# Patient Record
Sex: MALE | Race: WHITE | NOT HISPANIC OR LATINO | Employment: OTHER | URBAN - METROPOLITAN AREA
[De-identification: names, ages, dates, MRNs, and addresses within clinical notes are randomized per-mention and may not be internally consistent; named-entity substitution may affect disease eponyms.]

---

## 2017-11-28 ENCOUNTER — HOSPITAL ENCOUNTER (INPATIENT)
Facility: CLINIC | Age: 82
LOS: 2 days | Discharge: HOME | DRG: 247 | End: 2017-11-30
Attending: EMERGENCY MEDICINE | Admitting: INTERNAL MEDICINE
Payer: MEDICARE

## 2017-11-28 ENCOUNTER — APPOINTMENT (OUTPATIENT)
Dept: GENERAL RADIOLOGY | Facility: CLINIC | Age: 82
DRG: 247 | End: 2017-11-28
Attending: PHYSICIAN ASSISTANT
Payer: MEDICARE

## 2017-11-28 DIAGNOSIS — E11.65 TYPE 2 DIABETES MELLITUS WITH HYPERGLYCEMIA, WITHOUT LONG-TERM CURRENT USE OF INSULIN (H): ICD-10-CM

## 2017-11-28 DIAGNOSIS — I21.4 NSTEMI (NON-ST ELEVATED MYOCARDIAL INFARCTION) (H): ICD-10-CM

## 2017-11-28 DIAGNOSIS — I24.9 ACS (ACUTE CORONARY SYNDROME) (H): Primary | ICD-10-CM

## 2017-11-28 DIAGNOSIS — I10 ESSENTIAL HYPERTENSION: ICD-10-CM

## 2017-11-28 LAB
ALBUMIN SERPL-MCNC: 3.8 G/DL (ref 3.4–5)
ALP SERPL-CCNC: 58 U/L (ref 40–150)
ALT SERPL W P-5'-P-CCNC: 31 U/L (ref 0–70)
ANION GAP SERPL CALCULATED.3IONS-SCNC: 10 MMOL/L (ref 3–14)
AST SERPL W P-5'-P-CCNC: 21 U/L (ref 0–45)
BASOPHILS # BLD AUTO: 0 10E9/L (ref 0–0.2)
BASOPHILS NFR BLD AUTO: 0.2 %
BILIRUB SERPL-MCNC: 0.5 MG/DL (ref 0.2–1.3)
BUN SERPL-MCNC: 17 MG/DL (ref 7–30)
CALCIUM SERPL-MCNC: 9.3 MG/DL (ref 8.5–10.1)
CHLORIDE SERPL-SCNC: 101 MMOL/L (ref 94–109)
CO2 SERPL-SCNC: 27 MMOL/L (ref 20–32)
CREAT SERPL-MCNC: 0.76 MG/DL (ref 0.66–1.25)
DIFFERENTIAL METHOD BLD: NORMAL
EOSINOPHIL # BLD AUTO: 0.2 10E9/L (ref 0–0.7)
EOSINOPHIL NFR BLD AUTO: 1.5 %
ERYTHROCYTE [DISTWIDTH] IN BLOOD BY AUTOMATED COUNT: 13.4 % (ref 10–15)
FLUAV+FLUBV AG SPEC QL: NEGATIVE
FLUAV+FLUBV AG SPEC QL: NEGATIVE
GFR SERPL CREATININE-BSD FRML MDRD: >90 ML/MIN/1.7M2
GLUCOSE BLDC GLUCOMTR-MCNC: 129 MG/DL (ref 70–99)
GLUCOSE SERPL-MCNC: 242 MG/DL (ref 70–99)
HBA1C MFR BLD: 6 % (ref 4.3–6)
HCT VFR BLD AUTO: 43.5 % (ref 40–53)
HGB BLD-MCNC: 15.1 G/DL (ref 13.3–17.7)
IMM GRANULOCYTES # BLD: 0 10E9/L (ref 0–0.4)
IMM GRANULOCYTES NFR BLD: 0.3 %
LMWH PPP CHRO-ACNC: 0.22 IU/ML
LYMPHOCYTES # BLD AUTO: 1.4 10E9/L (ref 0.8–5.3)
LYMPHOCYTES NFR BLD AUTO: 13.5 %
MCH RBC QN AUTO: 30.3 PG (ref 26.5–33)
MCHC RBC AUTO-ENTMCNC: 34.7 G/DL (ref 31.5–36.5)
MCV RBC AUTO: 87 FL (ref 78–100)
MONOCYTES # BLD AUTO: 0.6 10E9/L (ref 0–1.3)
MONOCYTES NFR BLD AUTO: 5.9 %
NEUTROPHILS # BLD AUTO: 7.8 10E9/L (ref 1.6–8.3)
NEUTROPHILS NFR BLD AUTO: 78.6 %
NRBC # BLD AUTO: 0 10*3/UL
NRBC BLD AUTO-RTO: 0 /100
PLATELET # BLD AUTO: 209 10E9/L (ref 150–450)
POTASSIUM SERPL-SCNC: 3.5 MMOL/L (ref 3.4–5.3)
PROT SERPL-MCNC: 7.1 G/DL (ref 6.8–8.8)
RBC # BLD AUTO: 4.99 10E12/L (ref 4.4–5.9)
SODIUM SERPL-SCNC: 138 MMOL/L (ref 133–144)
SPECIMEN SOURCE: NORMAL
TROPONIN I SERPL-MCNC: 0.33 UG/L (ref 0–0.04)
TROPONIN I SERPL-MCNC: 2.84 UG/L (ref 0–0.04)
TROPONIN I SERPL-MCNC: 7.1 UG/L (ref 0–0.04)
WBC # BLD AUTO: 10 10E9/L (ref 4–11)

## 2017-11-28 PROCEDURE — 99285 EMERGENCY DEPT VISIT HI MDM: CPT | Mod: 25

## 2017-11-28 PROCEDURE — 85025 COMPLETE CBC W/AUTO DIFF WBC: CPT | Performed by: PHYSICIAN ASSISTANT

## 2017-11-28 PROCEDURE — 93010 ELECTROCARDIOGRAM REPORT: CPT | Performed by: INTERNAL MEDICINE

## 2017-11-28 PROCEDURE — 99223 1ST HOSP IP/OBS HIGH 75: CPT | Mod: 25 | Performed by: INTERNAL MEDICINE

## 2017-11-28 PROCEDURE — 25000128 H RX IP 250 OP 636: Performed by: PHYSICIAN ASSISTANT

## 2017-11-28 PROCEDURE — 25000132 ZZH RX MED GY IP 250 OP 250 PS 637: Performed by: NURSE PRACTITIONER

## 2017-11-28 PROCEDURE — 84484 ASSAY OF TROPONIN QUANT: CPT | Performed by: NURSE PRACTITIONER

## 2017-11-28 PROCEDURE — 96376 TX/PRO/DX INJ SAME DRUG ADON: CPT

## 2017-11-28 PROCEDURE — 87804 INFLUENZA ASSAY W/OPTIC: CPT | Performed by: NURSE PRACTITIONER

## 2017-11-28 PROCEDURE — 80053 COMPREHEN METABOLIC PANEL: CPT | Performed by: PHYSICIAN ASSISTANT

## 2017-11-28 PROCEDURE — 00000146 ZZHCL STATISTIC GLUCOSE BY METER IP

## 2017-11-28 PROCEDURE — 99223 1ST HOSP IP/OBS HIGH 75: CPT | Mod: AI | Performed by: NURSE PRACTITIONER

## 2017-11-28 PROCEDURE — 85520 HEPARIN ASSAY: CPT | Performed by: INTERNAL MEDICINE

## 2017-11-28 PROCEDURE — 96365 THER/PROPH/DIAG IV INF INIT: CPT

## 2017-11-28 PROCEDURE — 21000001 ZZH R&B HEART CARE

## 2017-11-28 PROCEDURE — 93005 ELECTROCARDIOGRAM TRACING: CPT

## 2017-11-28 PROCEDURE — 25000131 ZZH RX MED GY IP 250 OP 636 PS 637: Performed by: NURSE PRACTITIONER

## 2017-11-28 PROCEDURE — 99207 ZZC APP CREDIT; MD BILLING SHARED VISIT: CPT | Performed by: INTERNAL MEDICINE

## 2017-11-28 PROCEDURE — 84484 ASSAY OF TROPONIN QUANT: CPT | Performed by: PHYSICIAN ASSISTANT

## 2017-11-28 PROCEDURE — 71020 XR CHEST 2 VW: CPT

## 2017-11-28 PROCEDURE — 83036 HEMOGLOBIN GLYCOSYLATED A1C: CPT | Performed by: NURSE PRACTITIONER

## 2017-11-28 PROCEDURE — 25000128 H RX IP 250 OP 636: Performed by: NURSE PRACTITIONER

## 2017-11-28 PROCEDURE — 25000132 ZZH RX MED GY IP 250 OP 250 PS 637: Performed by: PHYSICIAN ASSISTANT

## 2017-11-28 PROCEDURE — 83036 HEMOGLOBIN GLYCOSYLATED A1C: CPT | Performed by: PHYSICIAN ASSISTANT

## 2017-11-28 PROCEDURE — 36415 COLL VENOUS BLD VENIPUNCTURE: CPT | Performed by: INTERNAL MEDICINE

## 2017-11-28 PROCEDURE — 36415 COLL VENOUS BLD VENIPUNCTURE: CPT | Performed by: NURSE PRACTITIONER

## 2017-11-28 PROCEDURE — 25000132 ZZH RX MED GY IP 250 OP 250 PS 637: Performed by: INTERNAL MEDICINE

## 2017-11-28 PROCEDURE — 25000125 ZZHC RX 250: Performed by: INTERNAL MEDICINE

## 2017-11-28 RX ORDER — AMOXICILLIN 250 MG
1 CAPSULE ORAL 2 TIMES DAILY PRN
Status: DISCONTINUED | OUTPATIENT
Start: 2017-11-28 | End: 2017-11-30 | Stop reason: HOSPADM

## 2017-11-28 RX ORDER — PROCHLORPERAZINE MALEATE 5 MG
5 TABLET ORAL EVERY 6 HOURS PRN
Status: DISCONTINUED | OUTPATIENT
Start: 2017-11-28 | End: 2017-11-30 | Stop reason: HOSPADM

## 2017-11-28 RX ORDER — PREDNISONE 20 MG/1
20 TABLET ORAL 2 TIMES DAILY WITH MEALS
Status: COMPLETED | OUTPATIENT
Start: 2017-11-28 | End: 2017-11-30

## 2017-11-28 RX ORDER — DEXTROSE MONOHYDRATE 25 G/50ML
25-50 INJECTION, SOLUTION INTRAVENOUS
Status: DISCONTINUED | OUTPATIENT
Start: 2017-11-28 | End: 2017-11-30 | Stop reason: HOSPADM

## 2017-11-28 RX ORDER — ACETAMINOPHEN 325 MG/1
650 TABLET ORAL EVERY 4 HOURS PRN
Status: DISCONTINUED | OUTPATIENT
Start: 2017-11-28 | End: 2017-11-29

## 2017-11-28 RX ORDER — ONDANSETRON 2 MG/ML
4 INJECTION INTRAMUSCULAR; INTRAVENOUS EVERY 6 HOURS PRN
Status: DISCONTINUED | OUTPATIENT
Start: 2017-11-28 | End: 2017-11-30 | Stop reason: HOSPADM

## 2017-11-28 RX ORDER — CLONIDINE HYDROCHLORIDE 0.1 MG/1
0.1 TABLET ORAL 2 TIMES DAILY
Status: DISCONTINUED | OUTPATIENT
Start: 2017-11-28 | End: 2017-11-30 | Stop reason: HOSPADM

## 2017-11-28 RX ORDER — KETOCONAZOLE 20 MG/G
CREAM TOPICAL DAILY PRN
COMMUNITY
End: 2017-12-13

## 2017-11-28 RX ORDER — METHYLPREDNISOLONE 16 MG/1
32 TABLET ORAL ONCE
Status: COMPLETED | OUTPATIENT
Start: 2017-11-28 | End: 2017-11-28

## 2017-11-28 RX ORDER — DIPHENHYDRAMINE HCL 25 MG
50 CAPSULE ORAL ONCE
Status: DISCONTINUED | OUTPATIENT
Start: 2017-11-28 | End: 2017-11-30 | Stop reason: HOSPADM

## 2017-11-28 RX ORDER — HYDRALAZINE HYDROCHLORIDE 20 MG/ML
10 INJECTION INTRAMUSCULAR; INTRAVENOUS EVERY 4 HOURS PRN
Status: DISCONTINUED | OUTPATIENT
Start: 2017-11-28 | End: 2017-11-30 | Stop reason: HOSPADM

## 2017-11-28 RX ORDER — HYDROCODONE BITARTRATE AND ACETAMINOPHEN 5; 325 MG/1; MG/1
1-2 TABLET ORAL EVERY 4 HOURS PRN
Status: DISCONTINUED | OUTPATIENT
Start: 2017-11-28 | End: 2017-11-29

## 2017-11-28 RX ORDER — NITROGLYCERIN 80 MG/1
1 PATCH TRANSDERMAL ONCE
Status: COMPLETED | OUTPATIENT
Start: 2017-11-28 | End: 2017-11-28

## 2017-11-28 RX ORDER — DIPHENHYDRAMINE HYDROCHLORIDE 50 MG/ML
25 INJECTION INTRAMUSCULAR; INTRAVENOUS
Status: COMPLETED | OUTPATIENT
Start: 2017-11-29 | End: 2017-11-29

## 2017-11-28 RX ORDER — POLYETHYLENE GLYCOL 3350 17 G/17G
17 POWDER, FOR SOLUTION ORAL DAILY PRN
Status: DISCONTINUED | OUTPATIENT
Start: 2017-11-28 | End: 2017-11-30 | Stop reason: HOSPADM

## 2017-11-28 RX ORDER — SODIUM CHLORIDE 9 MG/ML
INJECTION, SOLUTION INTRAVENOUS CONTINUOUS
Status: DISCONTINUED | OUTPATIENT
Start: 2017-11-28 | End: 2017-11-29

## 2017-11-28 RX ORDER — ASPIRIN 325 MG
325 TABLET ORAL DAILY
Status: DISCONTINUED | OUTPATIENT
Start: 2017-11-29 | End: 2017-11-29

## 2017-11-28 RX ORDER — LOSARTAN POTASSIUM 50 MG/1
50 TABLET ORAL DAILY
Status: DISCONTINUED | OUTPATIENT
Start: 2017-11-28 | End: 2017-11-30 | Stop reason: HOSPADM

## 2017-11-28 RX ORDER — ONDANSETRON 4 MG/1
4 TABLET, ORALLY DISINTEGRATING ORAL EVERY 6 HOURS PRN
Status: DISCONTINUED | OUTPATIENT
Start: 2017-11-28 | End: 2017-11-30 | Stop reason: HOSPADM

## 2017-11-28 RX ORDER — AMOXICILLIN 250 MG
2 CAPSULE ORAL 2 TIMES DAILY PRN
Status: DISCONTINUED | OUTPATIENT
Start: 2017-11-28 | End: 2017-11-30 | Stop reason: HOSPADM

## 2017-11-28 RX ORDER — MULTIVIT WITH MINERALS/LUTEIN
1 TABLET ORAL DAILY
COMMUNITY

## 2017-11-28 RX ORDER — POTASSIUM CHLORIDE 1.5 G/1.58G
20-40 POWDER, FOR SOLUTION ORAL
Status: DISCONTINUED | OUTPATIENT
Start: 2017-11-28 | End: 2017-11-30 | Stop reason: HOSPADM

## 2017-11-28 RX ORDER — LIDOCAINE 40 MG/G
CREAM TOPICAL
Status: DISCONTINUED | OUTPATIENT
Start: 2017-11-28 | End: 2017-11-29

## 2017-11-28 RX ORDER — ATORVASTATIN CALCIUM 40 MG/1
40 TABLET, FILM COATED ORAL DAILY
Status: DISCONTINUED | OUTPATIENT
Start: 2017-11-28 | End: 2017-11-29

## 2017-11-28 RX ORDER — NEBIVOLOL 5 MG/1
5 TABLET ORAL DAILY
Status: DISCONTINUED | OUTPATIENT
Start: 2017-11-28 | End: 2017-11-30 | Stop reason: HOSPADM

## 2017-11-28 RX ORDER — TERBINAFINE HYDROCHLORIDE 250 MG/1
250 TABLET ORAL DAILY
COMMUNITY
End: 2017-12-13

## 2017-11-28 RX ORDER — GUAIFENESIN/DEXTROMETHORPHAN 100-10MG/5
10 SYRUP ORAL EVERY 4 HOURS PRN
Status: DISCONTINUED | OUTPATIENT
Start: 2017-11-28 | End: 2017-11-30 | Stop reason: HOSPADM

## 2017-11-28 RX ORDER — NICOTINE POLACRILEX 4 MG
15-30 LOZENGE BUCCAL
Status: DISCONTINUED | OUTPATIENT
Start: 2017-11-28 | End: 2017-11-30 | Stop reason: HOSPADM

## 2017-11-28 RX ORDER — POTASSIUM CHLORIDE 29.8 MG/ML
20 INJECTION INTRAVENOUS
Status: DISCONTINUED | OUTPATIENT
Start: 2017-11-28 | End: 2017-11-30 | Stop reason: HOSPADM

## 2017-11-28 RX ORDER — NALOXONE HYDROCHLORIDE 0.4 MG/ML
.1-.4 INJECTION, SOLUTION INTRAMUSCULAR; INTRAVENOUS; SUBCUTANEOUS
Status: DISCONTINUED | OUTPATIENT
Start: 2017-11-28 | End: 2017-11-28

## 2017-11-28 RX ORDER — ALUMINA, MAGNESIA, AND SIMETHICONE 2400; 2400; 240 MG/30ML; MG/30ML; MG/30ML
30 SUSPENSION ORAL EVERY 4 HOURS PRN
Status: DISCONTINUED | OUTPATIENT
Start: 2017-11-28 | End: 2017-11-30 | Stop reason: HOSPADM

## 2017-11-28 RX ORDER — PROCHLORPERAZINE 25 MG
12.5 SUPPOSITORY, RECTAL RECTAL EVERY 12 HOURS PRN
Status: DISCONTINUED | OUTPATIENT
Start: 2017-11-28 | End: 2017-11-30 | Stop reason: HOSPADM

## 2017-11-28 RX ORDER — POTASSIUM CHLORIDE 1500 MG/1
20-40 TABLET, EXTENDED RELEASE ORAL
Status: DISCONTINUED | OUTPATIENT
Start: 2017-11-28 | End: 2017-11-30 | Stop reason: HOSPADM

## 2017-11-28 RX ORDER — ASPIRIN 81 MG/1
324 TABLET, CHEWABLE ORAL ONCE
Status: COMPLETED | OUTPATIENT
Start: 2017-11-28 | End: 2017-11-28

## 2017-11-28 RX ORDER — POTASSIUM CHLORIDE 7.45 MG/ML
10 INJECTION INTRAVENOUS
Status: DISCONTINUED | OUTPATIENT
Start: 2017-11-28 | End: 2017-11-30 | Stop reason: HOSPADM

## 2017-11-28 RX ORDER — DIPHENHYDRAMINE HYDROCHLORIDE 50 MG/ML
25 INJECTION INTRAMUSCULAR; INTRAVENOUS EVERY 6 HOURS PRN
Status: DISCONTINUED | OUTPATIENT
Start: 2017-11-28 | End: 2017-11-30 | Stop reason: HOSPADM

## 2017-11-28 RX ORDER — BISACODYL 10 MG
10 SUPPOSITORY, RECTAL RECTAL DAILY PRN
Status: DISCONTINUED | OUTPATIENT
Start: 2017-11-28 | End: 2017-11-30 | Stop reason: HOSPADM

## 2017-11-28 RX ORDER — AMLODIPINE BESYLATE 10 MG/1
10 TABLET ORAL DAILY
Status: DISCONTINUED | OUTPATIENT
Start: 2017-11-28 | End: 2017-11-30 | Stop reason: HOSPADM

## 2017-11-28 RX ORDER — POTASSIUM CL/LIDO/0.9 % NACL 10MEQ/0.1L
10 INTRAVENOUS SOLUTION, PIGGYBACK (ML) INTRAVENOUS
Status: DISCONTINUED | OUTPATIENT
Start: 2017-11-28 | End: 2017-11-30 | Stop reason: HOSPADM

## 2017-11-28 RX ORDER — METHYLPREDNISOLONE 16 MG/1
32 TABLET ORAL ONCE
Status: COMPLETED | OUTPATIENT
Start: 2017-11-28 | End: 2017-11-29

## 2017-11-28 RX ORDER — MAGNESIUM SULFATE HEPTAHYDRATE 40 MG/ML
4 INJECTION, SOLUTION INTRAVENOUS EVERY 4 HOURS PRN
Status: DISCONTINUED | OUTPATIENT
Start: 2017-11-28 | End: 2017-11-30 | Stop reason: HOSPADM

## 2017-11-28 RX ORDER — NALOXONE HYDROCHLORIDE 0.4 MG/ML
.1-.4 INJECTION, SOLUTION INTRAMUSCULAR; INTRAVENOUS; SUBCUTANEOUS
Status: DISCONTINUED | OUTPATIENT
Start: 2017-11-28 | End: 2017-11-29

## 2017-11-28 RX ADMIN — HEPARIN SODIUM 950 UNITS/HR: 10000 INJECTION, SOLUTION INTRAVENOUS at 12:52

## 2017-11-28 RX ADMIN — AMLODIPINE BESYLATE 10 MG: 10 TABLET ORAL at 16:05

## 2017-11-28 RX ADMIN — METHYLPREDNISOLONE 32 MG: 16 TABLET ORAL at 20:46

## 2017-11-28 RX ADMIN — LOSARTAN POTASSIUM 50 MG: 50 TABLET ORAL at 16:05

## 2017-11-28 RX ADMIN — CLONIDINE HYDROCHLORIDE 0.1 MG: 0.1 TABLET ORAL at 20:46

## 2017-11-28 RX ADMIN — NEBIVOLOL HYDROCHLORIDE 5 MG: 5 TABLET ORAL at 18:25

## 2017-11-28 RX ADMIN — PREDNISONE 20 MG: 20 TABLET ORAL at 18:25

## 2017-11-28 RX ADMIN — ASPIRIN 81 MG 324 MG: 81 TABLET ORAL at 12:27

## 2017-11-28 RX ADMIN — Medication 4650 UNITS: at 12:52

## 2017-11-28 RX ADMIN — ATORVASTATIN CALCIUM 40 MG: 40 TABLET, FILM COATED ORAL at 22:12

## 2017-11-28 RX ADMIN — CLONIDINE HYDROCHLORIDE 0.1 MG: 0.1 TABLET ORAL at 16:05

## 2017-11-28 RX ADMIN — SODIUM CHLORIDE: 9 INJECTION, SOLUTION INTRAVENOUS at 15:32

## 2017-11-28 RX ADMIN — NITROGLYCERIN 1 PATCH: 0.4 PATCH TRANSDERMAL at 12:28

## 2017-11-28 ASSESSMENT — ENCOUNTER SYMPTOMS
NUMBNESS: 0
HEADACHES: 0
ABDOMINAL PAIN: 0
WEAKNESS: 0
COUGH: 1
CHILLS: 1
VOMITING: 0
NAUSEA: 0
WHEEZING: 1
SORE THROAT: 1
DIAPHORESIS: 1
FEVER: 0

## 2017-11-28 NOTE — ED NOTES
DATE:  11/28/2017   TIME OF RECEIPT FROM LAB:  1202  LAB TEST:  trop  LAB VALUE:  0.332  RESULTS GIVEN WITH READ-BACK TO (PROVIDER):Dr. Vera  TIME LAB VALUE REPORTED TO PROVIDER:   8467

## 2017-11-28 NOTE — IP AVS SNAPSHOT
MRN:[id number]                      After Visit Summary   11/28/2017    Cayden Dewitt    MRN: [id number]           Thank you!     Thank you for choosing [facility] for your care. Our goal is always to provide you with excellent care. Hearing back from our patients is one way we can continue to improve our services. Please take a few minutes to complete the written survey that you may receive in the mail after you visit with us. Thank you!        Patient Information     Date Of Birth          1935        Designated Caregiver       Most Recent Value    Caregiver    Will someone help with your care after discharge? yes    Name of designated caregiver Abeba     Phone number of caregiver see chart     Caregiver address see chart       About your hospital stay     You were admitted on:  November 28, 2017 You last received care in the:  [facility]    You were discharged on:  November 30, 2017        Reason for your hospital stay       You were admitted with nstemi                  Who to Call     For medical emergencies, please call 911.  For non-urgent questions about your medical care, please call your primary care provider or clinic, None          Attending Provider     Provider Specialty    Barbara Vera MD Emergency Medicine    Keshia Rodas MD Internal Medicine    Karan, Moise Weller MD Internal Medicine       Primary Care Provider    None Specified      After Care Instructions     Activity       Your activity upon discharge: activity as tolerated            Diet       Follow this diet upon discharge: Orders Placed This Encounter      Combination Diet 3175-1645 Calories: High Consistent CHO (4-7 CHO units/meal); Low Saturated Fat Na <2400mg Diet                  Follow-up Appointments     Follow-up and recommended labs and tests        Follow up with primary care provider within 7 days for hospital follow- up.  No follow up labs or test are  needed.    Tuesday, DEC. 5TH AT 9:20AM WITH GREGORIO BRUMFIELD @ [facility]        SEE cardiology APPOINTMENTS BELOW                  Your next 10 appointments already scheduled     Dec 07, 2017  8:00 AM CST   Cardiac Evaluation with  Cardiac Rehab 1   [facility] ([facility])    [address], [address]  [facility] [address]   [phone]            Dec 13, 2017  1:30 PM CST   LAB with PANDEY LAB   [facility] ([facility])    [address] [place] [address] [address]  [facility] [address]   [phone]           Please do not eat 10-12 hours before your appointment if you are coming in fasting for labs on lipids, cholesterol, or glucose (sugar). This does not apply to pregnant women. Water, hot tea and black coffee (with nothing added) are okay. Do not drink other fluids, diet soda or chew gum.            Dec 13, 2017  2:30 PM CST   Return Discharge with THOMAS Fiore CNP   [facility] ([facility])    [address] [place], [place] [address]  [facility] [address]   [phone]            Feb 22, 2018  7:50 AM CST   LAB with PANDEY LAB   [facility] ([facility])    [address] [place], [place] [address]  [facility] [address]   [phone]           Please do not eat 10-12 hours before your appointment if you are coming in fasting for labs on lipids, cholesterol, or glucose (sugar). This does not apply to pregnant women. Water, hot tea and black coffee (with nothing added) are okay. Do not drink other fluids, diet soda or chew gum.            Feb 22, 2018  8:45 AM CST   Return Visit with Magdy Collins MD   [facility] ([facility])    [address]  [facility] [address]   [phone]              Additional Services     CARDIAC REHAB REFERRAL       Your  provider has referred you to: [facility]: [facility] [phone]   [email or web address]            Cardiac Rehab Referral       Your provider has referred you to: [facility]: [facility] [phone]   [email or web address]            Follow-Up with Cardiac Advanced Practice Provider           Follow-Up with Cardiologist                 Future tests that were ordered for you     Basic metabolic panel           ALT       Last Lab Result: ALT (U/L)       Date                     Value                 11/28/2017               31               ----------            Lipid Profile                 Further instructions from your care team       Cardiac Angiogram Discharge Instructions - Radial    After you go home:      Have an adult stay with you until tomorrow.    Drink extra fluids for 2 days.    You may resume your normal diet.    No smoking       For 24 hours - due to the sedation you received:    Relax and take it easy.    Do NOT make any important or legal decisions.    Do NOT drive or operate machines at home or at work.    Do NOT drink alcohol.    Care of Wrist Puncture Site:      For the first 24 hrs - check the puncture site every 1-2 hours while awake.    It is normal to have soreness at the puncture site and mild tingling in your hand for up to 3 days.    Remove the bandaid after 24 hours. If there is minor oozing, apply another bandaid and remove it after 12 hours.    You may shower tomorrow.  Do NOT take a bath, or use a hot tub or pool for at least 3 days. Do NOT scrub the site. Do not use lotion or powder near the puncture site.           Activity:        For 2 days:     do not use your hand or arm to support your weight (such as rising from a chair)     do not bend your wrist (such as lifting a garage door).    do not lift more than 5  pounds or exercise your arm (such as tennis, golf or bowling).    Do NOT do any heavy activity such as exercise, lifting, or straining.     Bleeding:      If you start bleeding from the site in your wrist, sit down and press firmly on/above the site for 10 minutes.     Once bleeding stops, keep arm still for 2 hours.     Call [facility] as soon as you can.       Call 911 right away if you have heavy bleeding or bleeding that does not stop.      Medicines:      If you are taking antiplatelet medications such as Plavix, Brilinta or Effient, do not stop taking it until you talk to your cardiologist.        If you are on Metformin (Glucophage), do not restart it until you have blood tests (within 2 to 3 days after discharge).  After you have your blood drawn, you may restart the Metformin.     Take your medications, including blood thinners, unless your provider tells you not to.  If you take Coumadin (Warfarin), have your INR checked by your provider in  3-5 days. Call your clinic to schedule this.    If you have stopped any medicines, check with your provider about when to restart them.    Follow Up Appointments:      Follow up with [facility] Nurse Practitioner at [facility] of patient preference in 7-10 days.    Call the clinic if:      You have a large or growing hard lump around the site.    The site is red, swollen, hot or tender.    Blood or fluid is draining from the site.    You have chills or a fever greater than 101 F (38 C).    Your arm turns feels numb, cool or changes color.    You have hives, a rash or unusual itching.    Any questions or concerns.          [facility] [place]:    [phone] [facility] (7 days a week)            Pending Results     Date and Time Order Name Status Description    11/29/2017 1600 EKG 12-lead, tracing only  Preliminary     11/29/2017 0105 EKG 12-lead, tracing only Preliminary     11/28/2017 1502 EKG 12-lead, tracing only Preliminary            "  Statement of Approval     Ordered          17 1325  I have reviewed and agree with all the recommendations and orders detailed in this document.  EFFECTIVE NOW     Approved and electronically signed by:  Moise Russo MD             Admission Information     Date & Time Provider Department Dept. Phone    2017 Moise Russo MD [facility] [phone]      Your Vitals Were     Blood Pressure Temperature Respirations Height Weight Pulse Oximetry    123/79 (BP Location: Left arm) 97.7  F (36.5  C) (Oral) 16 1.753 m (5' 9\") 95.9 kg (211 lb 6.7 oz) 98%    BMI (Body Mass Index)                   31.22 kg/m2           MyChart Information     [email or web address] lets you send messages to your doctor, view your test results, renew your prescriptions, schedule appointments and more. To sign up, go to [email or web address] . Click on \"Log in\" on the left side of the screen, which will take you to the Welcome page. Then click on \"Sign up Now\" on the right side of the page.     You will be asked to enter the access code listed below, as well as some personal information. Please follow the directions to create your username and password.     Your access code is: [email or web address]  Expires: 2018  9:33 AM     Your access code will  in 90 days. If you need help or a new code, please call your [facility] clinic or [phone].        Care EveryWhere ID     This is your Care EveryWhere ID. This could be used by other organizations to access your [facility] medical records  [id number]        Equal Access to Services     [facility] Center[facility] : Hadii delmi duran hadsherie Soalexandra, waaxda luqadaha, qaybta kaalmaesther martinez. So [facility] [phone].    ATENCIÓN: Si habla español, tiene a jasmine disposición servicios gratuitos de asistencia lingüística. Llame al [phone].    We comply with applicable federal civil rights laws and [place] laws. We do not " discriminate on the basis of race, color, national origin, age, disability, sex, sexual orientation, or gender identity.               Review of your medicines      START taking        Dose / Directions    aspirin 81 MG EC tablet        Dose:  81 mg   Start taking on:  12/1/2017   Take 1 tablet (81 mg) by mouth daily   Refills:  0       atorvastatin 20 MG tablet   Commonly known as:  LIPITOR        Dose:  20 mg   Take 1 tablet (20 mg) by mouth daily   Quantity:  30 tablet   Refills:  3       clopidogrel 75 MG tablet   Commonly known as:  PLAVIX        Dose:  75 mg   Start taking on:  12/1/2017   Take 1 tablet (75 mg) by mouth daily   Quantity:  30 tablet   Refills:  11       nebivolol 5 MG tablet   Commonly known as:  BYSTOLIC        Dose:  5 mg   Start taking on:  12/1/2017   Take 1 tablet (5 mg) by mouth daily   Quantity:  30 tablet   Refills:  3       nitroGLYcerin 0.4 MG sublingual tablet   Commonly known as:  NITROSTAT        For chest pain place 1 tablet under the tongue every 5 minutes for 3 doses. If symptoms persist 5 minutes after 1st dose call 911.   Quantity:  25 tablet   Refills:  3         CONTINUE these medicines which may have CHANGED, or have new prescriptions. If we are uncertain of the size of tablets/capsules you have at home, strength may be listed as something that might have changed.        Dose / Directions    amLODIPine 10 MG tablet   Commonly known as:  NORVASC   This may have changed:  medication strength   Used for:  Essential hypertension        Dose:  10 mg   Start taking on:  12/1/2017   Take 1 tablet (10 mg) by mouth daily   Quantity:  30 tablet   Refills:  1       metFORMIN 1000 MG tablet   Commonly known as:  GLUCOPHAGE   This may have changed:    - medication strength  - how much to take   Used for:  Type 2 diabetes mellitus with hyperglycemia, without long-term current use of insulin (H)   Notes to Patient:  May resume 48 hours after angiogram. Start 12/1 evening dose.         Dose:  1000 mg   Take 1 tablet (1,000 mg) by mouth 2 times daily (with meals)   Quantity:  60 tablet   Refills:  0         CONTINUE these medicines which have NOT CHANGED        Dose / Directions    CENTRUM SILVER per tablet        Dose:  1 tablet   Take 1 tablet by mouth daily   Refills:  0       CLONIDINE HCL PO        Dose:  0.1 mg   Take 0.1 mg by mouth 2 times daily   Refills:  0       ketoconazole 2 % cream   Commonly known as:  NIZORAL        Apply topically daily as needed (Apply to rash on feet)   Refills:  0       LOSARTAN POTASSIUM PO        Dose:  50 mg   Take 50 mg by mouth daily   Refills:  0       terbinafine 250 MG tablet   Commonly known as:  lamISIL        Dose:  250 mg   Take 250 mg by mouth daily   Refills:  0         STOP taking     HYDROCHLOROTHIAZIDE PO                Where to get your medicines      Some of these will need a paper prescription and others can be bought over the counter. Ask your nurse if you have questions.     Bring a paper prescription for each of these medications     amLODIPine 10 MG tablet    atorvastatin 20 MG tablet    clopidogrel 75 MG tablet    metFORMIN 1000 MG tablet    nebivolol 5 MG tablet    nitroGLYcerin 0.4 MG sublingual tablet       You don't need a prescription for these medications     aspirin 81 MG EC tablet               ANTIBIOTIC INSTRUCTION     You've Been Prescribed an Antibiotic - Now What?  Your healthcare team thinks that you or your loved one might have an infection. Some infections can be treated with antibiotics, which are powerful, life-saving drugs. Like all medications, antibiotics have side effects and should only be used when necessary. There are some important things you should know about your antibiotic treatment.      Your healthcare team may run tests before you start taking an antibiotic.    Your team may take samples (e.g., from your blood, urine or other areas) to run tests to look for bacteria. These test can be important to  determine if you need an antibiotic at all and, if you do, which antibiotic will work best.      Within a few days, your healthcare team might change or even stop your antibiotic.    Your team may start you on an antibiotic while they are working to find out what is making you sick.    Your team might change your antibiotic because test results show that a different antibiotic would be better to treat your infection.    In some cases, once your team has more information, they learn that you do not need an antibiotic at all. They may find out that you don't have an infection, or that the antibiotic you're taking won't work against your infection. For example, an infection caused by a virus can't be treated with antibiotics. Staying on an antibiotic when you don't need it is more likely to be harmful than helpful.      You may experience side effects from your antibiotic.    Like all medications, antibiotics have side effects. Some of these can be serious.    Let you healthcare team know if you have any known allergies when you are admitted to the hospital.    One significant side effect of nearly all antibiotics is the risk of severe and sometimes deadly diarrhea caused by Clostridium difficile (C. Difficile). This occurs when a person takes antibiotics because some good germs are destroyed. Antibiotic use allows C. diificile to take over, putting patients at high risk for this serious infection.    As a patient or caregiver, it is important to understand your or your loved one's antibiotic treatment. It is especially important for caregivers to speak up when patients can't speak for themselves. Here are some important questions to ask your healthcare team.    What infection is this antibiotic treating and how do you know I have that infection?    What side effects might occur from this antibiotic?    How long will I need to take this antibiotic?    Is it safe to take this antibiotic with other medications or  supplements (e.g., vitamins) that I am taking?     Are there any special directions I need to know about taking this antibiotic? For example, should I take it with food?    How will I be monitored to know whether my infection is responding to the antibiotic?    What tests may help to make sure the right antibiotic is prescribed for me?      Information provided by:  www.cdc.gov/getsmart  U.S. Department of Health and Human Services  Centers for disease Control and Prevention  National Center for Emerging and Zoonotic Infectious Diseases  Division of Healthcare Quality Promotion         Protect others around you: Learn how to safely use, store and throw away your medicines at www.disposemymeds.org.             Medication List: This is a list of all your medications and when to take them. Check marks below indicate your daily home schedule. Keep this list as a reference.      Medications           Morning Afternoon Evening Bedtime As Needed    amLODIPine 10 MG tablet   Commonly known as:  NORVASC   Take 1 tablet (10 mg) by mouth daily   Start taking on:  12/1/2017   Last time this was given:  10 mg on 11/30/2017  9:54 AM   Next Dose Due:  12/1 morning                                   aspirin 81 MG EC tablet   Take 1 tablet (81 mg) by mouth daily   Start taking on:  12/1/2017   Last time this was given:  81 mg on 11/30/2017  9:54 AM   Next Dose Due:  12/1 morning                                   atorvastatin 20 MG tablet   Commonly known as:  LIPITOR   Take 1 tablet (20 mg) by mouth daily   Last time this was given:  20 mg on 11/29/2017  9:44 PM   Next Dose Due:  12/1 morning                                   CENTRUM SILVER per tablet   Take 1 tablet by mouth daily   Next Dose Due:  12/1 morning                                   CLONIDINE HCL PO   Take 0.1 mg by mouth 2 times daily   Last time this was given:  0.1 mg on 11/30/2017  9:52 AM   Next Dose Due:  11/30 evening                                       clopidogrel 75 MG tablet   Commonly known as:  PLAVIX   Take 1 tablet (75 mg) by mouth daily   Start taking on:  12/1/2017   Last time this was given:  75 mg on 11/30/2017  9:53 AM   Next Dose Due:  12/1 morning                                   ketoconazole 2 % cream   Commonly known as:  NIZORAL   Apply topically daily as needed (Apply to rash on feet)                                   LOSARTAN POTASSIUM PO   Take 50 mg by mouth daily   Last time this was given:  50 mg on 11/30/2017  9:54 AM   Next Dose Due:  12/1 morning                                   metFORMIN 1000 MG tablet   Commonly known as:  GLUCOPHAGE   Take 1 tablet (1,000 mg) by mouth 2 times daily (with meals)   Next Dose Due:  12/1 evening   Notes to Patient:  May resume 48 hours after angiogram. Start 12/1 evening dose.                                      nebivolol 5 MG tablet   Commonly known as:  BYSTOLIC   Take 1 tablet (5 mg) by mouth daily   Start taking on:  12/1/2017   Last time this was given:  5 mg on 11/30/2017  9:53 AM   Next Dose Due:  12/1 morning                                   nitroGLYcerin 0.4 MG sublingual tablet   Commonly known as:  NITROSTAT   For chest pain place 1 tablet under the tongue every 5 minutes for 3 doses. If symptoms persist 5 minutes after 1st dose call 911.                                   terbinafine 250 MG tablet   Commonly known as:  lamISIL   Take 250 mg by mouth daily   Next Dose Due:  12/1 morning

## 2017-11-28 NOTE — CONSULTS
CARDIOLOGY CONSULTATION      REASON FOR CONSULTATION:  Chest pain      HISTORY OF PRESENT ILLNESS:  Cayden Soto is an 82-year-old gentleman who has no known heart disease but multiple cardiac risk factors.  For the last few days he has noticed what is probably bronchitis.  He reports having some wheezing, which he has had in the past, and he remembered having bronchitic asthma in the past.  He has been coughing quite a bit.  He developed a band of pain across his anterior chest, a little bit worse when he pushes on it and also worse when he is coughing.  It has been going on for a couple days.  He reports mostly clear sputum? but initially it was a little bit of yellow-colored sputum.  He denies any fever.  He has had a bit of a runny nose.  No one around him has been sick, and he has not had any recent travel.  He has tried warm packs, ice packs, etc. for the pain.  It is variably helped.  The pain has never gone to his neck, shoulders, jaw.  He has had no dizzy spells, nausea or syncope.  Finally, he came into the Emergency Room.  He had a mildly elevated troponin, and I have been asked to see him.      PAST MEDICAL HISTORY:   1.  Hypertension.   2.  Hyperlipidemia   3.  Previous history of asthma.   4.  Prostate surgery.   5.  Tonsillectomy.  6.   DM        Of note, he sees Dr. Kashif Nassar.  I do not see anything on our [facility] Everywhere, so they may use a different system.      MEDICATIONS ON ADMISSION:   1.  Clonidine 0.1 mg b.i.d.   2.  Amlodipine 10 mg daily.   3.  Losartan 50 mg daily.     4.  Nizoral p.r.n.    5.  Lamisil 250 mg daily.   6.  Metformin 850 mg b.i.d.   7.  Hydrochlorothiazide 25 mg daily.      ALLERGIES:  He has a dye allergy.  He had some type of angiogram film in the past and developed facial and lip swelling and was given some medicine, presumably epinephrine, and it cleared.  Also has an allergy to Cipro.      FAMILY HISTORY:  Father  of a subdural hematoma after a fall.   Mother  with history of Alzheimer's.  The patient is an only child.      SOCIAL HISTORY:  The patient is still working doing film restoration.  Never smoker and rarely drinks alcohol.      REVIEW OF SYSTEMS:  Rather completely negative except as outlined above.     CARDIAC:  This is the first time he has had any kind of chest pain.  No history of dizziness, palpitation.   RESPIRATORY:  Includes only a history of asthma in the past.   GASTROINTESTINAL:  Negative.   ORTHOPEDIC:  Negative.   ENDOCRINE:  Includes his diabetes.     NEUROLOGIC:  Negative.   GENITOURINARY:  Negative.   The remainder of the review of systems is negative.      PHYSICAL EXAMINATION:   GENERAL:  Reveals a pleasant gentleman in no apparent distress.  Any chest pain he has is very trivial, he states.    VITAL SIGNS:  Blood pressure is running high at 161/98, heart rate us 95, temperature 98.4, weight 88.5 kg.   SKIN:  No petechiae, rash or xanthoma.   HEENT:  Nonicteric.   NECK:  Supple, without thyromegaly or adenopathy.   CHEST:  Exam reveals soft wheezing on expiration bilaterally.   CARDIAC:  Exam reveals markedly distant heart tones.  No obvious murmur or rub.  Carotid pulses 2+.  Femoral pulse I cannot feel because of such a large pannus, but the dorsal pedal pulses are present at 1+.  No obvious bruits are noted in any area.  Neck veins are flat.   ABDOMEN:  Quite obese, which limits the exam.  No clear organomegaly.  The abdomen is nontender.  Normal bowel sounds are noted.   EXTREMITIES:  There is no cyanosis, clubbing.  There is trace edema bilaterally, which he states is chronic.  Palpation of the rib cage might slightly affect his chest pain.   NEUROLOGIC:  Alert and oriented.  Cranial nerves II-XII are intact.  Motor and sensory intact.      LABORATORY:  EKG demonstrates sinus rhythm with a right bundle branch block.  There is J point elevation in leads I and aVL with minimal Q-waves in those same leads, but that same exact  pattern was seen on an EKG dated 10/06/1996.  There are no acute ST-T changes, but there is nonspecific T-wave flattening inferior.  There is only 1 troponin back so far.  It is 0.332.  Glucose 242, sodium 138, potassium 3.5, creatinine 0.76.  CBC panel including white count is normal.  Influenza A and B were negative.  Chest x-ray is negative for infiltrates.      IMPRESSION:  This is a patient who has had what sounds like bronchitis as evidenced by cough productive of clear to yellow sputum and wheezing.  He has chest pain.  It is unclear if the chest pain is simply musculoskeletal pain from coughing versus angina.  His troponin has trivial elevation, but this has been going on for at least 2 days.  The EKG, although abnormal, is stable from .  Given a significant dye allergy and a questionable acute coronary syndrome now, I think we should continue on medical therapy, adding heparin and a statin and aspirin, and treating his blood pressure.  Will also place him on prednisone in preparation for heart catheterization.  Will plan on doing the heart catheterization tomorrow after he has received steroid and Benadryl.  The risks and benefits of angiogram, angioplasty or bypass surgery including the risk of stroke, heart attack and death were discussed with the patient, and he wishes to proceed.  Again, this may represent acute coronary syndrome, but this simply may be musculoskeletal pain from bronchitis and coughing, but again, multiple risk factors are noted.  We will also get a lipid profile.  His hemoglobin A1c suggests the diabetes has been pretty well controlled.         LU COLLINS MD             D: 2017 15:27   T: 2017 15:50   MT:       Name:     ABIGAIL FAGAN   MRN:      [id number]        Account:       [id number]   :      1935           Consult Date:  2017      Document: [id number]       cc: Kashif Collins MD

## 2017-11-28 NOTE — CONSULTS
Cardio dictated  Bronchitis  Poss acs  Add bystolic watch bp and cath tomorrow  Benadryl prednisone for allergy  thanks

## 2017-11-28 NOTE — ED PROVIDER NOTES
History     Chief Complaint:  Chest Pain       HPI   Cayden Dewitt is a 82 year old male with a history of hypertension, type II diabetes mellitus, and asthma who presents accompanied by his wife for evaluation of chest pain. Recently, the patient reports that he has had a frequent deep cough and a sore throat. Yesterday evening, the patient additionally developed some slight wheezing and had a brief episode of chills and diaphoresis. This morning, the patient awoke with soreness across his chest. The patient initially attributed his chest pain to muscular pain from his cough, and it did improve somewhat after taking a hot bath. However, due to this new chest pain the patient came into the ED for evaluation. While on the way to the ED, the patient had another episode of diaphoresis. Currently in the ED, the patient rates his chest pain at a severity of 7/10. Notably, the patient did not take his blood pressure medication this morning. He also expresses concern that he could have influenza as one of his friends who he has contact with recently had this. He has not had any measured fever, shortness of breath, new leg swelling, abdominal pain, nausea, vomiting, headache, numbness, tingling, or weakness in association with his current symptoms.     Allergies:  Ciprofloxacin  Iodine     Medications:    METFORMIN HCL PO  HYDROCHLOROTHIAZIDE PO    Past Medical History:    Hypertension  Diabetes mellitus, type II   Asthma     Past Surgical History:    Prostate surgery      Family History:    family history is not on file.     Social History:   reports that he has never smoked. He does not have any smokeless tobacco history on file.  The patient presents accompanied by his wife.   PCP: Kashif Nassar     Review of Systems   Constitutional: Positive for chills and diaphoresis. Negative for fever.   HENT: Positive for sore throat.    Respiratory: Positive for cough and wheezing.    Cardiovascular: Positive for chest  "pain. Negative for leg swelling.   Gastrointestinal: Negative for abdominal pain, nausea and vomiting.   Neurological: Negative for weakness, numbness and headaches.   All other systems reviewed and are negative.    Physical Exam     Patient Vitals for the past 24 hrs:   BP Temp Temp src Heart Rate Resp SpO2 Height Weight   11/28/17 1100 156/89 98.7  F (37.1  C) Oral 95 20 97 % 1.753 m (5' 9\") 88.5 kg (195 lb)        General: Alert, interactive in no distress. Obese  Head:  Scalp is atraumatic.  Eyes:  EOM intact. The pupils are equal, round, and reactive to light. No scleral icterus.  ENT:                                      Ears:  The external ears are normal.  Nose:  The external nose is normal.  Throat:  The oropharynx is normal. Mucus membranes are moist.                 Neck:  Normal range of motion. There is no rigidity. Trachea midline.  CV:  Regular rate and rhythm. No murmur. No reproducible chest pain.   Resp:  Breath sounds are clear bilaterally. Non-labored, no retractions or accessory muscle use.  GI:  Abdomen is soft, no distension, no tenderness.   MS:  Normal strength in all 4 extremities, No midline cervical, thoracic, or lumbar tenderness   Skin:  Warm and dry, No rash or lesions noted.  Neuro:  Strength 5/5 x4. Sensation grossly intact. CN 3-12 intact. GCS: 15  Psych:  Awake. Alert and orientedx3. Appropriate interactions.   Lymph: No anterior or posterior cervical lymphadenopathy noted.        Emergency Department Course     ECG (10:59:57):  Rate 101 bpm.   QT/QTc 396/513 ms.   P-R-T axes 9 7 -6.   Sinus tachycardia with premature atrial complexes, Right bundle branch block, T wave abnormality consider inferior ischemia, Abnormal ECG  Interpreted at 1120 by Barbara Vera MD.     Imaging:  XR Chest 2 Views   Preliminary Result   IMPRESSION: Negative.          All imaging results were discussed with the patient and wife who voiced understanding of the findings.    Laboratory:  Labs Ordered " and Resulted from Time of ED Arrival Up to the Time of Departure from the ED   COMPREHENSIVE METABOLIC PANEL - Abnormal; Notable for the following:        Result Value    Glucose 242 (*)     All other components within normal limits   TROPONIN I - Abnormal; Notable for the following:     Troponin I ES 0.332 (*)     All other components within normal limits   CBC WITH PLATELETS DIFFERENTIAL   PULSE OXIMETRY NURSING   CARDIAC CONTINUOUS MONITORING   MEASURE WEIGHT   PLATELETS MONITORED PER HEPARIN TREATMENT PROTOCOL (FOR MEANINGFUL USE      Interventions:  Medications   nitroGLYcerin (NITRODUR) patch REMOVAL (not administered)   heparin infusion 25,000 units in 0.45% NaCl 250 mL (950 Units/hr Intravenous New Bag 11/28/17 1252)   aspirin chewable tablet 324 mg (324 mg Oral Given 11/28/17 1227)   nitroGLYcerin (NITRODUR) 0.4 MG/HR 24 hr patch 1 patch (1 patch Transdermal Given 11/28/17 1228)   heparin Loading Dose bolus dose from infusion pump 4,650 Units (4,650 Units Intravenous Given 11/28/17 1252)        Emergency Department Course:  Past medical records, nursing notes, and vitals reviewed.  1114: I performed an exam of the patient and obtained history, as documented above.  Blood drawn and IV inserted   The patient was sent for a chest x-ray while in the emergency department, findings above.   My supervising provider, Dr. Vera, also interviewed and examined the patient at bedside.   12:10 PM: Dr. Vera updated the patient on the elevated troponin and discussed admission with the patient.     Findings and plan explained to the patient and spouse who consents to admission.     Discussed the patient with Dr. Rodas, who will admit the patient [place] [facility] [place] for further monitoring, evaluation, and treatment.      Impression & Plan      Medical Decision Making:  Cayden Dewitt is a 82 year old male with a medical history including DM type 2 and hypertension who presents with chest discomfort. BP slightly  "elevated on arrival, which patient attributed to not taking his blood pressure medications this morning, otherwise vitals stable. The patient attributed the \"chest soreness\" he awoke with at 0300 to deep cough he experienced the night before. He has a negative cardiac history. EKG done on arrival showed a right bundle branch block, similar to EKG from 1996. Troponin elevated at 0.332. CXR negative. The patient had minimal chest discomfort here. Nitro and aspirin given. Heparin drip started given high suspicion of acute coronary syndrome. Discussed patient with Dr. Rodas who will admit the patient to [facility] medical bed for further care. There is no clinical, laboratory, or radiographic evidence of pulmonary embolism, AAA, aortic dissection, pneumonia or pneumothorax. Patient and wife agree with the plan for admission.       Diagnosis:    ICD-10-CM    1. NSTEMI (non-ST elevated myocardial infarction) (H) I21.4         Jorgito NAVARRO, am serving as a scribe at 11:14 AM on 11/28/2017 to document services personally performed by Gladys Callahan PA-C, based on my observations and the provider's statements to me.    11/28/2017   Gladys Callahan PA-C  Supervising provider, Barbara Vera MD Luell, Gladys Hwang PA-C  11/28/17 1302    "

## 2017-11-28 NOTE — H&P
"DATE OF SERVICE: 11/28/2017    PRIMARY CARE PROVIDER:  Inna Quezada PA-C.      CHIEF COMPLAINT:  Chest pain.      HISTORY OF PRESENT ILLNESS:  Mr. Cayden Dewitt is an 82-year-old male with a past medical history significant for essential hypertension, diabetes mellitus type 2, and mild intermittent asthma who presents today with worsening chest pain.  The patient reports that he had developed \"muscular chest pain\" in his bilateral upper chest last evening that had developed after severely coughing for the past several days.  The patient reports that he had developed upper respiratory illness like symptoms a couple days prior, including rhinorrhea, cough, sore throat, chills and wheezing.  The patient reports that he subsequently had diaphoresis last night and this morning associated with this chest pain.  Last night after the patient developed chest pain, he took a hot bath, and was able to find relief from his chest pain.  This morning, he noted that he had recurrent bilateral upper chest pain that did not radiate down his bilateral upper extremities/back/neck/jaw.      Presently, the patient is seen in the Emergency Department with his wife.  Mr. Dewitt, while in the Emergency Department, received aspirin 324 mg chewable tablet.  A nitroglycerin patch was applied to his right upper extremity, and he was started on a heparin infusion as his troponin was positive at 0.33.  Currently, the patient reports no chest pain, shortness of breath, nausea, vomiting, diarrhea, constipation, fevers, chills or dysuria.      PAST MEDICAL HISTORY:   1.  Essential hypertension.   2.  Diabetes mellitus type 2.   3.  Mild intermittent asthma.      PAST SURGICAL HISTORY:   1.  Prostate surgery.   2.  Tonsillectomy.   3.  Adenoidectomy.      SOCIAL HISTORY:   1.  Tobacco:  Never.   2.  Alcohol:  None.   3.  Illicit drugs:  Never.   4.  He lives in a house with his wife.   5.  He ambulates with the use of a cane.      FAMILY " HISTORY:   1.  Mother had Alzheimer's.   2.  Paternal grandmother had CVA.      ALLERGIES:   1.  Ciprofloxacin.   2.  Iodine.      MEDICATIONS:    Prior to Admission medications    Medication Sig Last Dose Taking? Auth Provider   CLONIDINE HCL PO Take 0.1 mg by mouth 2 times daily  11/27/2017 at Unknown time Yes Unknown, Entered By History   Multiple Vitamins-Minerals (CENTRUM SILVER) per tablet Take 1 tablet by mouth daily 11/27/2017 at Unknown time Yes Unknown, Entered By History   AMLODIPINE BESYLATE PO Take 10 mg by mouth daily 11/27/2017 at Unknown time Yes Unknown, Entered By History   LOSARTAN POTASSIUM PO Take 50 mg by mouth daily 11/27/2017 at Unknown time Yes Unknown, Entered By History   ketoconazole (NIZORAL) 2 % cream Apply topically daily as needed (Apply to rash on feet)  Yes Unknown, Entered By History   terbinafine (LAMISIL) 250 MG tablet Take 250 mg by mouth daily  Yes Unknown, Entered By History   METFORMIN HCL PO Take 850 mg by mouth 2 times daily (with meals)  11/27/2017 at Unknown time Yes Reported, Patient   HYDROCHLOROTHIAZIDE PO Take 25 mg by mouth daily  11/27/2017 at Unknown time Yes Reported, Patient     REVIEW OF SYSTEMS:  A 10-point review of systems was completed.  All pertinent positives are noted in the HPI.  All other systems negative.      PHYSICAL EXAMINATION:   VITAL SIGNS:  Reviewed and are as follows:  Temperature is 98.7, blood pressure 148/96, heart rate 87, respiratory rate 15 and O2 sats 99% on room air.   CONSTITUTIONAL:  The patient lying in bed.  He is mildly anxious, awake, alert and cooperative.  He appears stated age.  He is well groomed.   HEENT:  Pupils are equal, round and reactive to light.  Extraocular muscles are intact.  Sclerae are clear.  Normocephalic and atraumatic.  Oropharynx with moist mucous membranes.   NECK:  Supple.  No adenopathy.  Normal range of motion.  No nuchal rigidity noted.   LUNGS:  No increased work of breathing.  Clear to auscultation  bilaterally posteriorly.  No crackles or wheezing noted.   CARDIOVASCULAR:  Normal apical pulse.  Regular rate and rhythm.  Normal S1 and S2.  No murmur, rub or gallop noted.   ABDOMEN:  Normal bowel sounds.  Soft, nondistended and nontender.  No mass is palpated.  Obese.   EXTREMITIES:  He moves all 4 extremities.  Dorsalis pedis and radial pulses are palpable bilaterally.  Lower extremities have no edema.   NEUROLOGIC:  Awake, alert and oriented.  Cranial nerves II-XII are grossly intact.  Sensory is intact.  Speech is fluent.   SKIN:  Warm and dry.  No lesions or rash noted.  No erythema.   PSYCHIATRIC:  Mentation appears normal.  Affect is normal/mildly anxious.      LABORATORY DATA:  Reviewed in Epic.  Reviewed EKG on admission noted T wave inversions in leads III, avf and V3 and RBBB.      ASSESSMENT/PLAN:  Mr. Cayden Dewitt is an 82-year-old male with a past medical history significant for essential hypertension, diabetes mellitus type 2 and mild intermittent asthma who presents with worsening chest pain over the past 24 hours.  The patient is being admitted for further evaluation and management.       1.  NSTEMI.    The patient reports worsening chest pain over the past 24 hours in association with development of upper respiratory illness.         a.  Nitroglycerin patch is in place, to be removed in 24 hours.        b.  The patient received 324 mg of aspirin in the Emergency Department.  We will initiate 325 mg aspirin daily starting 11/29/2017.        c.  Heparin infusion was initiated in the Emergency Department for ACS.  We will continue heparin infusion.  Pharmacy to dose.          d.  Initial troponin on admission was 0.33.  Repeat troponins every 4 hours x3, until troponin peaks.        e.  Cardiology consult was ordered, appreciate recommendations.          f.  N.p.o. at this time until further Cardiology recommendations.        g.  IV fluids at 100 mL per hour.         H. Resume the patient's  gddnz-ql-pmjcakcyn ARB, losartan.        i.  The patient was initiated on a statin, Lipitor 40 mg daily.        j. The patient was started on contrast dye allergy pre-medication in the setting of possible angiogram.        k.  Fasting lipid panel to be completed tomorrow a.m., 11/29.        L.  BB not initiated on admission as prior to admission antihypertensives resumed, appreciate cardiology recommendations.       M.  Echo complete ordered.       N.  PRN O2 to maintain O2 sats >92%.       O.  Continuous telemetry monitoring.       P.  Cardiac rehab consulted.       Q.  Influenza a/b negative.    2.  Essential hypertension.        a.  Resume prior to-admission amlodipine, clonidine and losartan with hold parameters.        b.  Hold dnhfh-uk-ajcrlauee hydrochlorothiazide in setting of possible angiogram and contrast dye load (renal protection).        C.  PRN hydralazine ordered with hold parameters.    3.  Diabetes mellitus type 2, controlled.        a.  Hold smmnj-pu-rklgxjjuy metformin.        b.  NPO correctional medium intensity sliding scale insulin ordered every 4 hours while n.p.o.  Transition to before meals and at bedtime when the patient is tolerating diet.        c.  Hemoglobin A1c was ordered, 6.0.        d.  Hypoglycemia order set ordered.        E.  Glucose POCT q4h while NPO or before meals and at bedtime when the patient is tolerating diet.       F.  Carbohydrate counting by nursing when tolerating moderate consistent carbohydrate/cardiac diet.    4.  Mild intermittent asthma, stable.    The patient reports use of an albuterol inhaler in the distant past.  At this time, we will continue to monitor as no signs of shortness of breath or respiratory distress.     5.  Deep venous thrombosis prophylaxis.  Heparin infusion.      CODE STATUS:  Full code.      DISPOSITION:  Discharge to home pending further Cardiology workup for NSTEMI.      This patient was discussed with Dr. Keshia Rodas of the  Hospitalist Service, who agrees with the current plans as outlined above.         KATY GONSALVES MD       As dictated by LEXX GAMBLE NP            D: 2017 13:57   T: 2017 14:47   MT: [id number]      Name:     ABIGAIL FAGAN   MRN:      [id number]        Account:      [id number]   :      1935           Admitted:     [id number]      Document: [id number]       cc: Inna Quezada PA-C

## 2017-11-28 NOTE — PROVIDER NOTIFICATION
MD Notification    Notified Person:  MD    Notified Persons Name:Madeline    Notification Date/Time: 11/28 16:00    Notification Interaction:  Talked with Physician    Purpose of Notification: Elevated troponin     Orders Received: continue current management, ok to eat.     Comments:

## 2017-11-28 NOTE — ED NOTES
"[facility]  ED Nurse Handoff Report    ED Chief complaint: Chest Pain (pt thinks from coughing . sore throat. 'sweaty')      ED Diagnosis:   Final diagnoses:   None       Code Status: Full Code    Allergies:   Allergies   Allergen Reactions     Ciprofloxacin      Iodine        Activity level - Baseline/Home:  Independent    Activity Level - Current:   Independent     Needed?: No    Isolation: No  Infection: Not Applicable    Bariatric?: No    Vital Signs:   Vitals:    11/28/17 1100   BP: 156/89   Resp: 20   Temp: 98.7  F (37.1  C)   TempSrc: Oral   SpO2: 97%   Weight: 88.5 kg (195 lb)   Height: 1.753 m (5' 9\")       Cardiac Rhythm: ,        Pain level: 0-10 Pain Scale: 7    Is this patient confused?: No    Patient Report: Initial Complaint: pt started having chest pain last night. Pt thought it was due to his coughing he was having. He was unable to sleep well last night due to the chest pain.   Focused Assessment: pt is a&ox4  Tests Performed: lans and chest xray  Abnormal Results: trop 0.332  Treatments provided:     Family Comments: wife is bedside    OBS brochure/video discussed/provided to patient: N/A    ED Medications:   Medications   aspirin chewable tablet 324 mg (not administered)   nitroGLYcerin (NITRODUR) 0.4 MG/HR 24 hr patch 1 patch (not administered)   nitroGLYcerin (NITRODUR) patch REMOVAL (not administered)       Drips infusing?:  No      ED NURSE PHONE NUMBER: [phone]       "

## 2017-11-28 NOTE — IP AVS SNAPSHOT
[facility]    [address] [place] [address]., [address]    [place] [address]    Phone:  [phone]                                       After Visit Summary   11/28/2017    Cayden Dewitt    MRN: [id number]           After Visit Summary Signature Page     I have received my discharge instructions, and my questions have been answered. I have discussed any challenges I see with this plan with the nurse or doctor.    ..........................................................................................................................................  Patient/Patient Representative Signature      ..........................................................................................................................................  Patient Representative Print Name and Relationship to Patient    ..................................................               ................................................  Date                                            Time    ..........................................................................................................................................  Reviewed by Signature/Title    ...................................................              ..............................................  Date                                                            Time

## 2017-11-28 NOTE — PHARMACY-ADMISSION MEDICATION HISTORY
Admission medication history interview status for the 11/28/2017  admission is complete. See EPIC admission navigator for prior to admission medications     Medication history source reliability:Good    Actions taken by pharmacist (provider contacted, etc): Called [facility] in [place] to get medication list and doses.     Additional medication history information not noted on PTA med list :None    Medication reconciliation/reorder completed by provider prior to medication history? No    Time spent in this activity: 20 minutes    Prior to Admission medications    Medication Sig Last Dose Taking? Auth Provider   CLONIDINE HCL PO Take 0.1 mg by mouth 2 times daily  11/27/2017 at Unknown time Yes Unknown, Entered By History   Multiple Vitamins-Minerals (CENTRUM SILVER) per tablet Take 1 tablet by mouth daily 11/27/2017 at Unknown time Yes Unknown, Entered By History   AMLODIPINE BESYLATE PO Take 10 mg by mouth daily 11/27/2017 at Unknown time Yes Unknown, Entered By History   LOSARTAN POTASSIUM PO Take 50 mg by mouth daily 11/27/2017 at Unknown time Yes Unknown, Entered By History   ketoconazole (NIZORAL) 2 % cream Apply topically daily as needed (Apply to rash on feet)  Yes Unknown, Entered By History   terbinafine (LAMISIL) 250 MG tablet Take 250 mg by mouth daily  Yes Unknown, Entered By History   METFORMIN HCL PO Take 850 mg by mouth 2 times daily (with meals)  11/27/2017 at Unknown time Yes Reported, Patient   HYDROCHLOROTHIAZIDE PO Take 25 mg by mouth daily  11/27/2017 at Unknown time Yes Reported, Patient     Rosey Mccartney, MecheD, BCPS  November 28, 2017

## 2017-11-29 ENCOUNTER — APPOINTMENT (OUTPATIENT)
Dept: CARDIOLOGY | Facility: CLINIC | Age: 82
DRG: 247 | End: 2017-11-29
Attending: INTERNAL MEDICINE
Payer: MEDICARE

## 2017-11-29 ENCOUNTER — APPOINTMENT (OUTPATIENT)
Dept: CARDIOLOGY | Facility: CLINIC | Age: 82
DRG: 247 | End: 2017-11-29
Attending: NURSE PRACTITIONER
Payer: MEDICARE

## 2017-11-29 LAB
ANION GAP SERPL CALCULATED.3IONS-SCNC: 8 MMOL/L (ref 3–14)
BUN SERPL-MCNC: 16 MG/DL (ref 7–30)
CALCIUM SERPL-MCNC: 8.4 MG/DL (ref 8.5–10.1)
CHLORIDE SERPL-SCNC: 106 MMOL/L (ref 94–109)
CHOLEST SERPL-MCNC: 161 MG/DL
CO2 SERPL-SCNC: 26 MMOL/L (ref 20–32)
CREAT SERPL-MCNC: 0.72 MG/DL (ref 0.66–1.25)
ERYTHROCYTE [DISTWIDTH] IN BLOOD BY AUTOMATED COUNT: 13.2 % (ref 10–15)
GFR SERPL CREATININE-BSD FRML MDRD: >90 ML/MIN/1.7M2
GLUCOSE BLDC GLUCOMTR-MCNC: 164 MG/DL (ref 70–99)
GLUCOSE BLDC GLUCOMTR-MCNC: 164 MG/DL (ref 70–99)
GLUCOSE BLDC GLUCOMTR-MCNC: 172 MG/DL (ref 70–99)
GLUCOSE BLDC GLUCOMTR-MCNC: 185 MG/DL (ref 70–99)
GLUCOSE BLDC GLUCOMTR-MCNC: 187 MG/DL (ref 70–99)
GLUCOSE BLDC GLUCOMTR-MCNC: 242 MG/DL (ref 70–99)
GLUCOSE SERPL-MCNC: 194 MG/DL (ref 70–99)
HCT VFR BLD AUTO: 40.9 % (ref 40–53)
HDLC SERPL-MCNC: 43 MG/DL
HGB BLD-MCNC: 14.2 G/DL (ref 13.3–17.7)
INTERPRETATION ECG - MUSE: NORMAL
LDLC SERPL CALC-MCNC: 95 MG/DL
LMWH PPP CHRO-ACNC: 0.16 IU/ML
MCH RBC QN AUTO: 30.3 PG (ref 26.5–33)
MCHC RBC AUTO-ENTMCNC: 34.7 G/DL (ref 31.5–36.5)
MCV RBC AUTO: 87 FL (ref 78–100)
NONHDLC SERPL-MCNC: 118 MG/DL
PLATELET # BLD AUTO: 205 10E9/L (ref 150–450)
POTASSIUM SERPL-SCNC: 4 MMOL/L (ref 3.4–5.3)
RBC # BLD AUTO: 4.68 10E12/L (ref 4.4–5.9)
SODIUM SERPL-SCNC: 140 MMOL/L (ref 133–144)
TRIGL SERPL-MCNC: 116 MG/DL
TROPONIN I SERPL-MCNC: 7.37 UG/L (ref 0–0.04)
TROPONIN I SERPL-MCNC: 8.26 UG/L (ref 0–0.04)
WBC # BLD AUTO: 10.5 10E9/L (ref 4–11)

## 2017-11-29 PROCEDURE — 99153 MOD SED SAME PHYS/QHP EA: CPT

## 2017-11-29 PROCEDURE — 25000132 ZZH RX MED GY IP 250 OP 250 PS 637: Mod: GY | Performed by: INTERNAL MEDICINE

## 2017-11-29 PROCEDURE — A9270 NON-COVERED ITEM OR SERVICE: HCPCS | Mod: GY | Performed by: NURSE PRACTITIONER

## 2017-11-29 PROCEDURE — C9600 PERC DRUG-EL COR STENT SING: HCPCS

## 2017-11-29 PROCEDURE — A9270 NON-COVERED ITEM OR SERVICE: HCPCS | Mod: GY | Performed by: INTERNAL MEDICINE

## 2017-11-29 PROCEDURE — 25000128 H RX IP 250 OP 636: Performed by: NURSE PRACTITIONER

## 2017-11-29 PROCEDURE — 85347 COAGULATION TIME ACTIVATED: CPT

## 2017-11-29 PROCEDURE — 93306 TTE W/DOPPLER COMPLETE: CPT | Mod: 26 | Performed by: INTERNAL MEDICINE

## 2017-11-29 PROCEDURE — 99152 MOD SED SAME PHYS/QHP 5/>YRS: CPT

## 2017-11-29 PROCEDURE — C1769 GUIDE WIRE: HCPCS

## 2017-11-29 PROCEDURE — 27211089 ZZH KIT ACIST INJECTOR CR3

## 2017-11-29 PROCEDURE — C1874 STENT, COATED/COV W/DEL SYS: HCPCS

## 2017-11-29 PROCEDURE — 27210946 ZZH KIT HC TOTES DISP CR8

## 2017-11-29 PROCEDURE — 27210759 ZZH DEVICE INFLATION CR6

## 2017-11-29 PROCEDURE — 27210914 ZZH SHEATH CR8

## 2017-11-29 PROCEDURE — 93005 ELECTROCARDIOGRAM TRACING: CPT

## 2017-11-29 PROCEDURE — 27210787 ZZH MANIFOLD CR2

## 2017-11-29 PROCEDURE — 27210892 ZZH CATH CR4

## 2017-11-29 PROCEDURE — B2111ZZ FLUOROSCOPY OF MULTIPLE CORONARY ARTERIES USING LOW OSMOLAR CONTRAST: ICD-10-PCS | Performed by: INTERNAL MEDICINE

## 2017-11-29 PROCEDURE — 25000132 ZZH RX MED GY IP 250 OP 250 PS 637: Mod: GY | Performed by: NURSE PRACTITIONER

## 2017-11-29 PROCEDURE — 25000131 ZZH RX MED GY IP 250 OP 636 PS 637: Performed by: NURSE PRACTITIONER

## 2017-11-29 PROCEDURE — C1887 CATHETER, GUIDING: HCPCS

## 2017-11-29 PROCEDURE — 25000125 ZZHC RX 250: Performed by: INTERNAL MEDICINE

## 2017-11-29 PROCEDURE — 4A023N7 MEASUREMENT OF CARDIAC SAMPLING AND PRESSURE, LEFT HEART, PERCUTANEOUS APPROACH: ICD-10-PCS | Performed by: INTERNAL MEDICINE

## 2017-11-29 PROCEDURE — 25000128 H RX IP 250 OP 636

## 2017-11-29 PROCEDURE — B2151ZZ FLUOROSCOPY OF LEFT HEART USING LOW OSMOLAR CONTRAST: ICD-10-PCS | Performed by: INTERNAL MEDICINE

## 2017-11-29 PROCEDURE — 027135Z DILATION OF CORONARY ARTERY, TWO ARTERIES WITH TWO DRUG-ELUTING INTRALUMINAL DEVICES, PERCUTANEOUS APPROACH: ICD-10-PCS | Performed by: INTERNAL MEDICINE

## 2017-11-29 PROCEDURE — 25000128 H RX IP 250 OP 636: Performed by: INTERNAL MEDICINE

## 2017-11-29 PROCEDURE — 85027 COMPLETE CBC AUTOMATED: CPT | Performed by: NURSE PRACTITIONER

## 2017-11-29 PROCEDURE — 25500064 ZZH RX 255 OP 636: Performed by: INTERNAL MEDICINE

## 2017-11-29 PROCEDURE — 40000264 ECHO COMPLETE WITH OPTISON

## 2017-11-29 PROCEDURE — C1725 CATH, TRANSLUMIN NON-LASER: HCPCS

## 2017-11-29 PROCEDURE — 84484 ASSAY OF TROPONIN QUANT: CPT | Performed by: NURSE PRACTITIONER

## 2017-11-29 PROCEDURE — 27210856 ZZH ACCESS HEART CATH CR2

## 2017-11-29 PROCEDURE — 80061 LIPID PANEL: CPT | Performed by: NURSE PRACTITIONER

## 2017-11-29 PROCEDURE — 36415 COLL VENOUS BLD VENIPUNCTURE: CPT | Performed by: NURSE PRACTITIONER

## 2017-11-29 PROCEDURE — 92928 PRQ TCAT PLMT NTRAC ST 1 LES: CPT | Mod: RI | Performed by: INTERNAL MEDICINE

## 2017-11-29 PROCEDURE — 99233 SBSQ HOSP IP/OBS HIGH 50: CPT | Mod: 25 | Performed by: INTERNAL MEDICINE

## 2017-11-29 PROCEDURE — 00000146 ZZHCL STATISTIC GLUCOSE BY METER IP

## 2017-11-29 PROCEDURE — 93458 L HRT ARTERY/VENTRICLE ANGIO: CPT

## 2017-11-29 PROCEDURE — 27210795 ZZH PAD DEFIB QUICK CR4

## 2017-11-29 PROCEDURE — 21000001 ZZH R&B HEART CARE

## 2017-11-29 PROCEDURE — 93458 L HRT ARTERY/VENTRICLE ANGIO: CPT | Mod: 26 | Performed by: INTERNAL MEDICINE

## 2017-11-29 PROCEDURE — 85520 HEPARIN ASSAY: CPT | Performed by: NURSE PRACTITIONER

## 2017-11-29 PROCEDURE — 80048 BASIC METABOLIC PNL TOTAL CA: CPT | Performed by: NURSE PRACTITIONER

## 2017-11-29 PROCEDURE — 99233 SBSQ HOSP IP/OBS HIGH 50: CPT | Performed by: INTERNAL MEDICINE

## 2017-11-29 PROCEDURE — 93010 ELECTROCARDIOGRAM REPORT: CPT | Mod: 76 | Performed by: INTERNAL MEDICINE

## 2017-11-29 PROCEDURE — 99152 MOD SED SAME PHYS/QHP 5/>YRS: CPT | Mod: GC | Performed by: INTERNAL MEDICINE

## 2017-11-29 RX ORDER — ACETAMINOPHEN 325 MG/1
325-650 TABLET ORAL EVERY 4 HOURS PRN
Status: DISCONTINUED | OUTPATIENT
Start: 2017-11-29 | End: 2017-11-30 | Stop reason: HOSPADM

## 2017-11-29 RX ORDER — ADENOSINE 3 MG/ML
12-12000 INJECTION, SOLUTION INTRAVENOUS
Status: DISCONTINUED | OUTPATIENT
Start: 2017-11-29 | End: 2017-11-29 | Stop reason: HOSPADM

## 2017-11-29 RX ORDER — DOPAMINE HYDROCHLORIDE 160 MG/100ML
2-20 INJECTION, SOLUTION INTRAVENOUS CONTINUOUS PRN
Status: DISCONTINUED | OUTPATIENT
Start: 2017-11-29 | End: 2017-11-29 | Stop reason: HOSPADM

## 2017-11-29 RX ORDER — FLUMAZENIL 0.1 MG/ML
0.2 INJECTION, SOLUTION INTRAVENOUS
Status: DISCONTINUED | OUTPATIENT
Start: 2017-11-29 | End: 2017-11-29 | Stop reason: HOSPADM

## 2017-11-29 RX ORDER — LIDOCAINE HYDROCHLORIDE 10 MG/ML
30 INJECTION, SOLUTION EPIDURAL; INFILTRATION; INTRACAUDAL; PERINEURAL
Status: DISCONTINUED | OUTPATIENT
Start: 2017-11-29 | End: 2017-11-29 | Stop reason: HOSPADM

## 2017-11-29 RX ORDER — IOPAMIDOL 755 MG/ML
300 INJECTION, SOLUTION INTRAVASCULAR ONCE
Status: DISCONTINUED | OUTPATIENT
Start: 2017-11-29 | End: 2017-11-29

## 2017-11-29 RX ORDER — FENTANYL CITRATE 50 UG/ML
25-50 INJECTION, SOLUTION INTRAMUSCULAR; INTRAVENOUS
Status: ACTIVE | OUTPATIENT
Start: 2017-11-29 | End: 2017-11-30

## 2017-11-29 RX ORDER — SODIUM NITROPRUSSIDE 25 MG/ML
100-200 INJECTION INTRAVENOUS
Status: DISCONTINUED | OUTPATIENT
Start: 2017-11-29 | End: 2017-11-29 | Stop reason: HOSPADM

## 2017-11-29 RX ORDER — NITROGLYCERIN 20 MG/100ML
.07-2 INJECTION INTRAVENOUS CONTINUOUS PRN
Status: DISCONTINUED | OUTPATIENT
Start: 2017-11-29 | End: 2017-11-29 | Stop reason: HOSPADM

## 2017-11-29 RX ORDER — NICARDIPINE HYDROCHLORIDE 2.5 MG/ML
100 INJECTION INTRAVENOUS
Status: DISCONTINUED | OUTPATIENT
Start: 2017-11-29 | End: 2017-11-29 | Stop reason: HOSPADM

## 2017-11-29 RX ORDER — DOBUTAMINE HYDROCHLORIDE 200 MG/100ML
2-20 INJECTION INTRAVENOUS CONTINUOUS PRN
Status: DISCONTINUED | OUTPATIENT
Start: 2017-11-29 | End: 2017-11-29 | Stop reason: HOSPADM

## 2017-11-29 RX ORDER — LORAZEPAM 0.5 MG/1
0.5 TABLET ORAL
Status: DISCONTINUED | OUTPATIENT
Start: 2017-11-29 | End: 2017-11-29 | Stop reason: HOSPADM

## 2017-11-29 RX ORDER — PRASUGREL 10 MG/1
10-60 TABLET, FILM COATED ORAL
Status: DISCONTINUED | OUTPATIENT
Start: 2017-11-29 | End: 2017-11-29 | Stop reason: HOSPADM

## 2017-11-29 RX ORDER — NITROGLYCERIN 0.4 MG/1
0.4 TABLET SUBLINGUAL EVERY 5 MIN PRN
Status: DISCONTINUED | OUTPATIENT
Start: 2017-11-29 | End: 2017-11-30 | Stop reason: HOSPADM

## 2017-11-29 RX ORDER — ENALAPRILAT 1.25 MG/ML
1.25-2.5 INJECTION INTRAVENOUS
Status: DISCONTINUED | OUTPATIENT
Start: 2017-11-29 | End: 2017-11-29 | Stop reason: HOSPADM

## 2017-11-29 RX ORDER — ASPIRIN 325 MG
325 TABLET ORAL
Status: DISCONTINUED | OUTPATIENT
Start: 2017-11-29 | End: 2017-11-29 | Stop reason: HOSPADM

## 2017-11-29 RX ORDER — HYDROCODONE BITARTRATE AND ACETAMINOPHEN 5; 325 MG/1; MG/1
1-2 TABLET ORAL EVERY 4 HOURS PRN
Status: DISCONTINUED | OUTPATIENT
Start: 2017-11-29 | End: 2017-11-30 | Stop reason: HOSPADM

## 2017-11-29 RX ORDER — ONDANSETRON 2 MG/ML
4 INJECTION INTRAMUSCULAR; INTRAVENOUS EVERY 4 HOURS PRN
Status: DISCONTINUED | OUTPATIENT
Start: 2017-11-29 | End: 2017-11-29 | Stop reason: HOSPADM

## 2017-11-29 RX ORDER — CLOPIDOGREL 300 MG/1
300-600 TABLET, FILM COATED ORAL
Status: COMPLETED | OUTPATIENT
Start: 2017-11-29 | End: 2017-11-29

## 2017-11-29 RX ORDER — NALOXONE HYDROCHLORIDE 0.4 MG/ML
0.4 INJECTION, SOLUTION INTRAMUSCULAR; INTRAVENOUS; SUBCUTANEOUS EVERY 5 MIN PRN
Status: DISCONTINUED | OUTPATIENT
Start: 2017-11-29 | End: 2017-11-29 | Stop reason: HOSPADM

## 2017-11-29 RX ORDER — PROMETHAZINE HYDROCHLORIDE 25 MG/ML
6.25-25 INJECTION, SOLUTION INTRAMUSCULAR; INTRAVENOUS EVERY 4 HOURS PRN
Status: DISCONTINUED | OUTPATIENT
Start: 2017-11-29 | End: 2017-11-29 | Stop reason: HOSPADM

## 2017-11-29 RX ORDER — LIDOCAINE 40 MG/G
CREAM TOPICAL
Status: DISCONTINUED | OUTPATIENT
Start: 2017-11-29 | End: 2017-11-30 | Stop reason: HOSPADM

## 2017-11-29 RX ORDER — LORAZEPAM 2 MG/ML
0.5 INJECTION INTRAMUSCULAR
Status: DISCONTINUED | OUTPATIENT
Start: 2017-11-29 | End: 2017-11-29 | Stop reason: HOSPADM

## 2017-11-29 RX ORDER — HYDRALAZINE HYDROCHLORIDE 20 MG/ML
10-20 INJECTION INTRAMUSCULAR; INTRAVENOUS
Status: DISCONTINUED | OUTPATIENT
Start: 2017-11-29 | End: 2017-11-29 | Stop reason: HOSPADM

## 2017-11-29 RX ORDER — DEXTROSE MONOHYDRATE 25 G/50ML
12.5-5 INJECTION, SOLUTION INTRAVENOUS EVERY 30 MIN PRN
Status: DISCONTINUED | OUTPATIENT
Start: 2017-11-29 | End: 2017-11-29 | Stop reason: HOSPADM

## 2017-11-29 RX ORDER — POTASSIUM CHLORIDE 1500 MG/1
20 TABLET, EXTENDED RELEASE ORAL
Status: DISCONTINUED | OUTPATIENT
Start: 2017-11-29 | End: 2017-11-29 | Stop reason: HOSPADM

## 2017-11-29 RX ORDER — ATORVASTATIN CALCIUM 20 MG/1
20 TABLET, FILM COATED ORAL DAILY
Status: DISCONTINUED | OUTPATIENT
Start: 2017-11-29 | End: 2017-11-30 | Stop reason: HOSPADM

## 2017-11-29 RX ORDER — CLOPIDOGREL BISULFATE 75 MG/1
75 TABLET ORAL
Status: DISCONTINUED | OUTPATIENT
Start: 2017-11-29 | End: 2017-11-29 | Stop reason: HOSPADM

## 2017-11-29 RX ORDER — PROTAMINE SULFATE 10 MG/ML
25-100 INJECTION, SOLUTION INTRAVENOUS EVERY 5 MIN PRN
Status: DISCONTINUED | OUTPATIENT
Start: 2017-11-29 | End: 2017-11-29 | Stop reason: HOSPADM

## 2017-11-29 RX ORDER — ATROPINE SULFATE 0.1 MG/ML
0.5 INJECTION INTRAVENOUS EVERY 5 MIN PRN
Status: ACTIVE | OUTPATIENT
Start: 2017-11-29 | End: 2017-11-30

## 2017-11-29 RX ORDER — VERAPAMIL HYDROCHLORIDE 2.5 MG/ML
1-2.5 INJECTION, SOLUTION INTRAVENOUS
Status: COMPLETED | OUTPATIENT
Start: 2017-11-29 | End: 2017-11-29

## 2017-11-29 RX ORDER — FENTANYL CITRATE 50 UG/ML
25-50 INJECTION, SOLUTION INTRAMUSCULAR; INTRAVENOUS
Status: DISCONTINUED | OUTPATIENT
Start: 2017-11-29 | End: 2017-11-29 | Stop reason: HOSPADM

## 2017-11-29 RX ORDER — BUPIVACAINE HYDROCHLORIDE 2.5 MG/ML
1-10 INJECTION, SOLUTION EPIDURAL; INFILTRATION; INTRACAUDAL
Status: DISCONTINUED | OUTPATIENT
Start: 2017-11-29 | End: 2017-11-29 | Stop reason: HOSPADM

## 2017-11-29 RX ORDER — SODIUM CHLORIDE 9 MG/ML
INJECTION, SOLUTION INTRAVENOUS CONTINUOUS
Status: DISCONTINUED | OUTPATIENT
Start: 2017-11-29 | End: 2017-11-29 | Stop reason: HOSPADM

## 2017-11-29 RX ORDER — CLOPIDOGREL BISULFATE 75 MG/1
75 TABLET ORAL DAILY
Status: DISCONTINUED | OUTPATIENT
Start: 2017-11-30 | End: 2017-11-30 | Stop reason: HOSPADM

## 2017-11-29 RX ORDER — NITROGLYCERIN 5 MG/ML
100-500 VIAL (ML) INTRAVENOUS
Status: COMPLETED | OUTPATIENT
Start: 2017-11-29 | End: 2017-11-29

## 2017-11-29 RX ORDER — ASPIRIN 81 MG/1
81 TABLET ORAL DAILY
Status: DISCONTINUED | OUTPATIENT
Start: 2017-11-30 | End: 2017-11-30 | Stop reason: HOSPADM

## 2017-11-29 RX ORDER — SODIUM CHLORIDE 9 MG/ML
INJECTION, SOLUTION INTRAVENOUS CONTINUOUS
Status: DISCONTINUED | OUTPATIENT
Start: 2017-11-29 | End: 2017-11-29

## 2017-11-29 RX ORDER — POTASSIUM CHLORIDE 29.8 MG/ML
20 INJECTION INTRAVENOUS
Status: DISCONTINUED | OUTPATIENT
Start: 2017-11-29 | End: 2017-11-29 | Stop reason: HOSPADM

## 2017-11-29 RX ORDER — NALOXONE HYDROCHLORIDE 0.4 MG/ML
.1-.4 INJECTION, SOLUTION INTRAMUSCULAR; INTRAVENOUS; SUBCUTANEOUS
Status: DISCONTINUED | OUTPATIENT
Start: 2017-11-29 | End: 2017-11-30 | Stop reason: HOSPADM

## 2017-11-29 RX ORDER — MORPHINE SULFATE 2 MG/ML
1-2 INJECTION, SOLUTION INTRAMUSCULAR; INTRAVENOUS EVERY 5 MIN PRN
Status: DISCONTINUED | OUTPATIENT
Start: 2017-11-29 | End: 2017-11-29 | Stop reason: HOSPADM

## 2017-11-29 RX ORDER — POTASSIUM CHLORIDE 7.45 MG/ML
10 INJECTION INTRAVENOUS
Status: DISCONTINUED | OUTPATIENT
Start: 2017-11-29 | End: 2017-11-29 | Stop reason: HOSPADM

## 2017-11-29 RX ORDER — NIFEDIPINE 10 MG/1
10 CAPSULE ORAL
Status: DISCONTINUED | OUTPATIENT
Start: 2017-11-29 | End: 2017-11-29 | Stop reason: HOSPADM

## 2017-11-29 RX ORDER — LIDOCAINE 40 MG/G
CREAM TOPICAL
Status: DISCONTINUED | OUTPATIENT
Start: 2017-11-29 | End: 2017-11-29 | Stop reason: HOSPADM

## 2017-11-29 RX ORDER — DIPHENHYDRAMINE HYDROCHLORIDE 50 MG/ML
25-50 INJECTION INTRAMUSCULAR; INTRAVENOUS
Status: DISCONTINUED | OUTPATIENT
Start: 2017-11-29 | End: 2017-11-29 | Stop reason: HOSPADM

## 2017-11-29 RX ORDER — METHYLPREDNISOLONE SODIUM SUCCINATE 125 MG/2ML
125 INJECTION, POWDER, LYOPHILIZED, FOR SOLUTION INTRAMUSCULAR; INTRAVENOUS
Status: DISCONTINUED | OUTPATIENT
Start: 2017-11-29 | End: 2017-11-29 | Stop reason: HOSPADM

## 2017-11-29 RX ORDER — NITROGLYCERIN 5 MG/ML
100-200 VIAL (ML) INTRAVENOUS
Status: DISCONTINUED | OUTPATIENT
Start: 2017-11-29 | End: 2017-11-29 | Stop reason: HOSPADM

## 2017-11-29 RX ORDER — FUROSEMIDE 10 MG/ML
20-100 INJECTION INTRAMUSCULAR; INTRAVENOUS
Status: DISCONTINUED | OUTPATIENT
Start: 2017-11-29 | End: 2017-11-29 | Stop reason: HOSPADM

## 2017-11-29 RX ORDER — FLUMAZENIL 0.1 MG/ML
0.2 INJECTION, SOLUTION INTRAVENOUS
Status: ACTIVE | OUTPATIENT
Start: 2017-11-29 | End: 2017-11-30

## 2017-11-29 RX ORDER — PROTAMINE SULFATE 10 MG/ML
1-5 INJECTION, SOLUTION INTRAVENOUS
Status: DISCONTINUED | OUTPATIENT
Start: 2017-11-29 | End: 2017-11-29 | Stop reason: HOSPADM

## 2017-11-29 RX ORDER — HEPARIN SODIUM 1000 [USP'U]/ML
1000-10000 INJECTION, SOLUTION INTRAVENOUS; SUBCUTANEOUS EVERY 5 MIN PRN
Status: DISCONTINUED | OUTPATIENT
Start: 2017-11-29 | End: 2017-11-29 | Stop reason: HOSPADM

## 2017-11-29 RX ORDER — PHENYLEPHRINE HCL IN 0.9% NACL 1 MG/10 ML
20-100 SYRINGE (ML) INTRAVENOUS
Status: DISCONTINUED | OUTPATIENT
Start: 2017-11-29 | End: 2017-11-29 | Stop reason: HOSPADM

## 2017-11-29 RX ORDER — METOPROLOL TARTRATE 1 MG/ML
5 INJECTION, SOLUTION INTRAVENOUS EVERY 5 MIN PRN
Status: DISCONTINUED | OUTPATIENT
Start: 2017-11-29 | End: 2017-11-29 | Stop reason: HOSPADM

## 2017-11-29 RX ORDER — ATROPINE SULFATE 0.1 MG/ML
.5-1 INJECTION INTRAVENOUS
Status: DISCONTINUED | OUTPATIENT
Start: 2017-11-29 | End: 2017-11-29 | Stop reason: HOSPADM

## 2017-11-29 RX ORDER — LIDOCAINE HYDROCHLORIDE 10 MG/ML
1-10 INJECTION, SOLUTION EPIDURAL; INFILTRATION; INTRACAUDAL; PERINEURAL
Status: COMPLETED | OUTPATIENT
Start: 2017-11-29 | End: 2017-11-29

## 2017-11-29 RX ORDER — NITROGLYCERIN 0.4 MG/1
0.4 TABLET SUBLINGUAL EVERY 5 MIN PRN
Status: DISCONTINUED | OUTPATIENT
Start: 2017-11-29 | End: 2017-11-29 | Stop reason: HOSPADM

## 2017-11-29 RX ORDER — EPINEPHRINE 1 MG/ML
0.3 INJECTION, SOLUTION, CONCENTRATE INTRAVENOUS
Status: DISCONTINUED | OUTPATIENT
Start: 2017-11-29 | End: 2017-11-29 | Stop reason: HOSPADM

## 2017-11-29 RX ORDER — LORAZEPAM 2 MG/ML
.5-2 INJECTION INTRAMUSCULAR EVERY 4 HOURS PRN
Status: DISCONTINUED | OUTPATIENT
Start: 2017-11-29 | End: 2017-11-29 | Stop reason: HOSPADM

## 2017-11-29 RX ORDER — NALOXONE HYDROCHLORIDE 0.4 MG/ML
.2-.4 INJECTION, SOLUTION INTRAMUSCULAR; INTRAVENOUS; SUBCUTANEOUS
Status: ACTIVE | OUTPATIENT
Start: 2017-11-29 | End: 2017-11-30

## 2017-11-29 RX ORDER — ASPIRIN 81 MG/1
81-324 TABLET, CHEWABLE ORAL
Status: DISCONTINUED | OUTPATIENT
Start: 2017-11-29 | End: 2017-11-29 | Stop reason: HOSPADM

## 2017-11-29 RX ADMIN — Medication 5000 UNITS: at 13:53

## 2017-11-29 RX ADMIN — ATORVASTATIN CALCIUM 20 MG: 20 TABLET, FILM COATED ORAL at 21:44

## 2017-11-29 RX ADMIN — HEPARIN SODIUM 950 UNITS/HR: 10000 INJECTION, SOLUTION INTRAVENOUS at 12:53

## 2017-11-29 RX ADMIN — FENTANYL CITRATE 150 MCG: 50 INJECTION, SOLUTION INTRAMUSCULAR; INTRAVENOUS at 15:30

## 2017-11-29 RX ADMIN — PREDNISONE 20 MG: 20 TABLET ORAL at 18:02

## 2017-11-29 RX ADMIN — CLOPIDOGREL BISULFATE 600 MG: 300 TABLET, FILM COATED ORAL at 15:04

## 2017-11-29 RX ADMIN — NITROGLYCERIN 200 MCG: 5 INJECTION, SOLUTION INTRAVENOUS at 15:31

## 2017-11-29 RX ADMIN — DIPHENHYDRAMINE HYDROCHLORIDE 25 MG: 50 INJECTION, SOLUTION INTRAMUSCULAR; INTRAVENOUS at 13:23

## 2017-11-29 RX ADMIN — CLONIDINE HYDROCHLORIDE 0.1 MG: 0.1 TABLET ORAL at 09:01

## 2017-11-29 RX ADMIN — NITROGLYCERIN 250 MCG: 5 INJECTION, SOLUTION INTRAVENOUS at 14:54

## 2017-11-29 RX ADMIN — CLONIDINE HYDROCHLORIDE 0.1 MG: 0.1 TABLET ORAL at 21:44

## 2017-11-29 RX ADMIN — Medication 2000 UNITS: at 15:07

## 2017-11-29 RX ADMIN — INSULIN ASPART 1 UNITS: 100 INJECTION, SOLUTION INTRAVENOUS; SUBCUTANEOUS at 00:22

## 2017-11-29 RX ADMIN — INSULIN ASPART 1 UNITS: 100 INJECTION, SOLUTION INTRAVENOUS; SUBCUTANEOUS at 04:38

## 2017-11-29 RX ADMIN — VERAPAMIL HYDROCHLORIDE 2.5 MG: 2.5 INJECTION, SOLUTION INTRAVENOUS at 13:54

## 2017-11-29 RX ADMIN — PREDNISONE 20 MG: 20 TABLET ORAL at 09:09

## 2017-11-29 RX ADMIN — SODIUM CHLORIDE: 9 INJECTION, SOLUTION INTRAVENOUS at 06:50

## 2017-11-29 RX ADMIN — INSULIN ASPART 3 UNITS: 100 INJECTION, SOLUTION INTRAVENOUS; SUBCUTANEOUS at 21:54

## 2017-11-29 RX ADMIN — Medication 3000 UNITS: at 14:42

## 2017-11-29 RX ADMIN — SODIUM CHLORIDE: 9 INJECTION, SOLUTION INTRAVENOUS at 10:42

## 2017-11-29 RX ADMIN — LOSARTAN POTASSIUM 50 MG: 50 TABLET ORAL at 09:01

## 2017-11-29 RX ADMIN — NITROGLYCERIN 200 MCG: 5 INJECTION, SOLUTION INTRAVENOUS at 13:53

## 2017-11-29 RX ADMIN — AMLODIPINE BESYLATE 10 MG: 10 TABLET ORAL at 09:01

## 2017-11-29 RX ADMIN — SODIUM CHLORIDE: 9 INJECTION, SOLUTION INTRAVENOUS at 02:20

## 2017-11-29 RX ADMIN — LIDOCAINE HYDROCHLORIDE 10 MG: 10 INJECTION, SOLUTION EPIDURAL; INFILTRATION; INTRACAUDAL; PERINEURAL at 13:51

## 2017-11-29 RX ADMIN — HUMAN ALBUMIN MICROSPHERES AND PERFLUTREN 3 ML: 10; .22 INJECTION, SOLUTION INTRAVENOUS at 10:05

## 2017-11-29 RX ADMIN — METHYLPREDNISOLONE 32 MG: 16 TABLET ORAL at 12:13

## 2017-11-29 RX ADMIN — NEBIVOLOL HYDROCHLORIDE 5 MG: 5 TABLET ORAL at 09:01

## 2017-11-29 RX ADMIN — INSULIN ASPART 1 UNITS: 100 INJECTION, SOLUTION INTRAVENOUS; SUBCUTANEOUS at 18:02

## 2017-11-29 RX ADMIN — Medication 6000 UNITS: at 14:23

## 2017-11-29 RX ADMIN — MIDAZOLAM HYDROCHLORIDE 4 MG: 1 INJECTION, SOLUTION INTRAMUSCULAR; INTRAVENOUS at 15:30

## 2017-11-29 RX ADMIN — ASPIRIN 325 MG: 325 TABLET, DELAYED RELEASE ORAL at 09:01

## 2017-11-29 NOTE — PROGRESS NOTES
"SPIRITUAL HEALTH SERVICES Progress Note  [facility] visited pt on request. Pt reflected that his health incident was an opportunity to \"get things in order\". When  asked about pt's family, pt started telling stories about his work as a  and his close relationship to Ellen Haynes and Ellen's family. Pt focused on these stories throughout the visit. Pt did share that he appreciated the visit from the [facility] and received communion.      provided reflective patient listening and support. Pt welcomed prayer. Pt expressed no further needs from . No plans to follow but  remains available by request.    Eyad Rivas M.Div.  Chaplain Resident  [phone] Pager  "

## 2017-11-29 NOTE — PLAN OF CARE
"Problem: Cardiac: ACS (Acute Coronary Syndrome) (Adult)  Goal: Signs and Symptoms of Listed Potential Problems Will be Absent, Minimized or Managed (Cardiac: ACS)  Signs and symptoms of listed potential problems will be absent, minimized or managed by discharge/transition of care (reference Cardiac: ACS (Acute Coronary Syndrome) (Adult) CPG).  Outcome: No Change  Pt. A&O. HTN, given BP medications. Anxious, calm and cooperative. Tele NSR with PACs. Pt. Reported recurrent chest pain 4/10 \"soreness\" mid sternal without radiation or diaphoresis. EKG done and cardiologist present. 2nd troponin result back when hospitalist was in the room and updated, continue current tx plan and monitor. Pt. Currently denies chest pain. Heparin gtt infusing. Nitro patch in place to right arm. Angio pamphlet given to patient along with verbal education/questions answered. CMS intact. Scant expiratory wheezing at times, room air, lungs otherwise clear. Pt. Reports congestive cough, mainly in AM, past couple days. Clear to light yellow sputum.       "

## 2017-11-29 NOTE — PLAN OF CARE
Problem: Patient Care Overview  Goal: Plan of Care/Patient Progress Review  OT/CR: Order rec'd. Going for angio today. Rescheduling eval for tomorrow.

## 2017-11-29 NOTE — PLAN OF CARE
Problem: Cardiac: ACS (Acute Coronary Syndrome) (Adult)  Goal: Signs and Symptoms of Listed Potential Problems Will be Absent, Minimized or Managed (Cardiac: ACS)  Signs and symptoms of listed potential problems will be absent, minimized or managed by discharge/transition of care (reference Cardiac: ACS (Acute Coronary Syndrome) (Adult) CPG).   Outcome: No Change  Pt alert and oriented but is forgetful.  Needs redirection frequently. Does use call light appropriately and is SBA with transfers.  Pt denies any pain and Vital signs remain stable. Pt on room air with no difficulty breathing. Currently pt is in cath lab. No further changes at this time.

## 2017-11-29 NOTE — PROGRESS NOTES
[facility]    Cardiology Progress Note    Date of Service: 11/29/2017   pt seen and examined by me. Literally he had 30 min of questions re: cad, how a cath is done, how stents work, how surgery occurs etc etc  Total visit time 45min  All questions answered including risk/benefit of procedures    Assessment & Plan   Cayden Dewitt is a 82 year old male with past medical history of hypertension, dyslipidemia, asthma.  This patient was admitted on 11/28/2017 with symptoms of wheezing, coughing and band of pain across chest.    1. Chest pain/NONSTEMI  Trop peak 8  Angiogram today  Premedicated for dye allergy with prednisone/benadryl  Echo: ef 45% with anterior, lateral, inferolateral WMA--suggests multivessel disease and prior MI  Asa  Amlodipine  Losartan  bystolic  nitrodur  Statin  Normal renal function  hgb 14  IV Heparin    2. HTN-improved control  On losartan/amlodipine PTA with uncontrolled bp's  Added bystolic(asthma)  bp better      3 dilated asc aorta  44mm    4 dyslipidemia  Lipitor 40mg daily added  LDL 95    5. DM-a1c 6.0  On metformin-will need to be held 48 hours post dye load        Interval History   No chest pain or sob    Review of Systems:  The Review of Systems is negative other than noted in the HPI    Physical Exam   Temp: 97.6  F (36.4  C) Temp src: Oral BP: 104/62   Heart Rate: 76 Resp: 20 SpO2: 96 % O2 Device: None (Room air)    Vitals:    11/28/17 1100 11/29/17 0435   Weight: 88.5 kg (195 lb) 93.6 kg (206 lb 6.4 oz)     Vital Signs with Ranges  Temp:  [97.6  F (36.4  C)-97.9  F (36.6  C)] 97.6  F (36.4  C)  Heart Rate:  [76-95] 76  Resp:  [20-22] 20  BP: (104-163)/(62-99) 104/62  SpO2:  [95 %-97 %] 96 %  I/O last 3 completed shifts:  In: 360 [P.O.:360]  Out: 300 [Urine:300]    Constitutional     alert and oriented, in no acute distress.     Skin     warm and dry to touch    ENT     no pallor or cyanosis    Neck    Supple, JVP normal, no carotid bruit    Chest     no  tenderness to palpation     Lungs  clear to auscultation !!    Cardiac  regular rhythm, S1 normal, S2 normal, No S3 or S4, no murmurs, no rubs    Abdomen     abdomen soft, bowel sounds normoactive, no hepatosplenomegaly    Extremities and Back     no clubbing, cyanosis. No edema observed.        Neurological     no gross motor deficits noted, affect appropriate, oriented to time, person and place.        Medications     NaCl 75 mL/hr at 11/29/17 1042     NaCl Stopped (11/29/17 0650)     - MEDICATION INSTRUCTIONS -       Continuing ACE inhibitor/ARB/ARNI from home medication list OR ACE inhibitor/ARB order already placed during this visit       Reason beta blocker order not selected       HEParin 950 Units/hr (11/28/17 2209)       sodium chloride (PF)  3 mL Intracatheter Q8H     aspirin EC  325 mg Oral Daily     nitroGLYcerin   Transdermal Once     sodium chloride (PF)  3 mL Intracatheter Q8H     methylPREDNISolone  32 mg Oral Once     diphenhydrAMINE  50 mg Oral Once     amLODIPine (NORVASC) tablet 10 mg  10 mg Oral Daily     cloNIDine (CATAPRES) tablet 0.1 mg  0.1 mg Oral BID     losartan (COZAAR) tablet 50 mg  50 mg Oral Daily     aspirin  325 mg Oral Daily     atorvastatin  40 mg Oral Daily     nebivolol  5 mg Oral Daily     predniSONE  20 mg Oral BID w/meals     diphenhydrAMINE  25 mg Intravenous Pre-Op/Pre-procedure x 1 dose     insulin aspart  1-6 Units Subcutaneous Q4H     insulin aspart  1-5 Units Subcutaneous At Bedtime          Data:     ROUTINE IP LABS (Last four results)  BMP  Recent Labs  Lab 11/29/17  0600 11/28/17  1131    138   POTASSIUM 4.0 3.5   CHLORIDE 106 101   MACIEJ 8.4* 9.3   CO2 26 27   BUN 16 17   CR 0.72 0.76   * 242*     CHOLESTEROL/HEPATIC  Recent Labs  Lab 11/29/17  0600 11/28/17  1131   CHOL 161  --    TRIG 116  --    LDL 95  --    HDL 43  --    ALT  --  31   AST  --  21     CBC  Recent Labs  Lab 11/29/17  0600 11/28/17  1131   WBC 10.5 10.0   RBC 4.68 4.99   HGB 14.2 15.1    HCT 40.9 43.5   MCV 87 87   MCH 30.3 30.3   MCHC 34.7 34.7   RDW 13.2 13.4    209     TROP:   Recent Labs  Lab 11/29/17  0600 11/28/17  2355 11/28/17  1920 11/28/17  1535 11/28/17  1131   TROPI 7.374* 8.262* 7.103* 2.836* 0.332*      BNP:  No results for input(s): NTBNPI in the last 168 hours.  INRNo lab results found in last 7 days.  No results found for: TSH    EKG results:  Reviewed if available     Imaging:  Recent Results (from the past 24 hour(s))   XR Chest 2 Views    Narrative    CHEST TWO VIEWS November 28, 2017 11:47 AM    HISTORY: Productive cough, chest pain.    COMPARISON: 2/13/2014.      Impression    IMPRESSION: Negative.     KINGSLEY PATTERSON MD     Telemetry: sinus with IVCD

## 2017-11-29 NOTE — PROGRESS NOTES
A call was placed Pia in the financial office as the pt does not have insurance listed on his facesheet. She stated she would look into this and update the facesheet if she finds any insurance.

## 2017-11-30 ENCOUNTER — APPOINTMENT (OUTPATIENT)
Dept: PHYSICAL THERAPY | Facility: CLINIC | Age: 82
DRG: 247 | End: 2017-11-30
Attending: NURSE PRACTITIONER
Payer: MEDICARE

## 2017-11-30 ENCOUNTER — NURSE TRIAGE (OUTPATIENT)
Dept: NURSING | Facility: CLINIC | Age: 82
End: 2017-11-30

## 2017-11-30 VITALS
SYSTOLIC BLOOD PRESSURE: 123 MMHG | OXYGEN SATURATION: 98 % | BODY MASS INDEX: 31.31 KG/M2 | TEMPERATURE: 97.7 F | DIASTOLIC BLOOD PRESSURE: 79 MMHG | HEIGHT: 69 IN | RESPIRATION RATE: 16 BRPM | WEIGHT: 211.42 LBS

## 2017-11-30 DIAGNOSIS — I21.4 NSTEMI (NON-ST ELEVATED MYOCARDIAL INFARCTION) (H): Primary | ICD-10-CM

## 2017-11-30 LAB
ANION GAP SERPL CALCULATED.3IONS-SCNC: 9 MMOL/L (ref 3–14)
BUN SERPL-MCNC: 19 MG/DL (ref 7–30)
CALCIUM SERPL-MCNC: 8.5 MG/DL (ref 8.5–10.1)
CHLORIDE SERPL-SCNC: 108 MMOL/L (ref 94–109)
CO2 SERPL-SCNC: 25 MMOL/L (ref 20–32)
CREAT SERPL-MCNC: 0.75 MG/DL (ref 0.66–1.25)
ERYTHROCYTE [DISTWIDTH] IN BLOOD BY AUTOMATED COUNT: 13.4 % (ref 10–15)
GFR SERPL CREATININE-BSD FRML MDRD: >90 ML/MIN/1.7M2
GLUCOSE BLDC GLUCOMTR-MCNC: 136 MG/DL (ref 70–99)
GLUCOSE BLDC GLUCOMTR-MCNC: 158 MG/DL (ref 70–99)
GLUCOSE BLDC GLUCOMTR-MCNC: 177 MG/DL (ref 70–99)
GLUCOSE SERPL-MCNC: 152 MG/DL (ref 70–99)
HCT VFR BLD AUTO: 41.6 % (ref 40–53)
HGB BLD-MCNC: 14.3 G/DL (ref 13.3–17.7)
KCT BLD-ACNC: 248 SEC (ref 105–167)
KCT BLD-ACNC: 260 SEC (ref 105–167)
MCH RBC QN AUTO: 30 PG (ref 26.5–33)
MCHC RBC AUTO-ENTMCNC: 34.4 G/DL (ref 31.5–36.5)
MCV RBC AUTO: 87 FL (ref 78–100)
PLATELET # BLD AUTO: 196 10E9/L (ref 150–450)
POTASSIUM SERPL-SCNC: 3.8 MMOL/L (ref 3.4–5.3)
RBC # BLD AUTO: 4.76 10E12/L (ref 4.4–5.9)
SODIUM SERPL-SCNC: 142 MMOL/L (ref 133–144)
WBC # BLD AUTO: 15.2 10E9/L (ref 4–11)

## 2017-11-30 PROCEDURE — 25000125 ZZHC RX 250: Performed by: INTERNAL MEDICINE

## 2017-11-30 PROCEDURE — A9270 NON-COVERED ITEM OR SERVICE: HCPCS | Mod: GY | Performed by: INTERNAL MEDICINE

## 2017-11-30 PROCEDURE — 25000132 ZZH RX MED GY IP 250 OP 250 PS 637: Mod: GY | Performed by: NURSE PRACTITIONER

## 2017-11-30 PROCEDURE — 00000146 ZZHCL STATISTIC GLUCOSE BY METER IP

## 2017-11-30 PROCEDURE — 97110 THERAPEUTIC EXERCISES: CPT | Mod: GP | Performed by: PHYSICAL THERAPIST

## 2017-11-30 PROCEDURE — A9270 NON-COVERED ITEM OR SERVICE: HCPCS | Mod: GY | Performed by: NURSE PRACTITIONER

## 2017-11-30 PROCEDURE — 36415 COLL VENOUS BLD VENIPUNCTURE: CPT | Performed by: INTERNAL MEDICINE

## 2017-11-30 PROCEDURE — 99207 ZZC NON-BILLABLE SERV PER CHARTING: CPT | Performed by: INTERNAL MEDICINE

## 2017-11-30 PROCEDURE — 25000132 ZZH RX MED GY IP 250 OP 250 PS 637: Mod: GY | Performed by: INTERNAL MEDICINE

## 2017-11-30 PROCEDURE — 40000193 ZZH STATISTIC PT WARD VISIT: Performed by: PHYSICAL THERAPIST

## 2017-11-30 PROCEDURE — 99232 SBSQ HOSP IP/OBS MODERATE 35: CPT | Performed by: INTERNAL MEDICINE

## 2017-11-30 PROCEDURE — 85027 COMPLETE CBC AUTOMATED: CPT | Performed by: INTERNAL MEDICINE

## 2017-11-30 PROCEDURE — 97161 PT EVAL LOW COMPLEX 20 MIN: CPT | Mod: GP | Performed by: PHYSICAL THERAPIST

## 2017-11-30 PROCEDURE — 80048 BASIC METABOLIC PNL TOTAL CA: CPT | Performed by: INTERNAL MEDICINE

## 2017-11-30 PROCEDURE — 97530 THERAPEUTIC ACTIVITIES: CPT | Mod: GP | Performed by: PHYSICAL THERAPIST

## 2017-11-30 RX ORDER — CARVEDILOL 6.25 MG/1
6.25 TABLET ORAL 2 TIMES DAILY WITH MEALS
Qty: 60 TABLET | Refills: 1 | OUTPATIENT
Start: 2017-11-30 | End: 2017-12-13

## 2017-11-30 RX ORDER — ATORVASTATIN CALCIUM 20 MG/1
20 TABLET, FILM COATED ORAL DAILY
Qty: 30 TABLET | Refills: 3 | Status: SHIPPED | OUTPATIENT
Start: 2017-11-30 | End: 2017-12-13

## 2017-11-30 RX ORDER — CLOPIDOGREL BISULFATE 75 MG/1
75 TABLET ORAL DAILY
Qty: 30 TABLET | Refills: 11 | Status: SHIPPED | OUTPATIENT
Start: 2017-12-01 | End: 2017-12-13

## 2017-11-30 RX ORDER — NEBIVOLOL 5 MG/1
5 TABLET ORAL DAILY
Qty: 30 TABLET | Refills: 3 | Status: SHIPPED | OUTPATIENT
Start: 2017-12-01 | End: 2017-11-30

## 2017-11-30 RX ORDER — AMLODIPINE BESYLATE 10 MG/1
10 TABLET ORAL DAILY
Qty: 30 TABLET | Refills: 1 | Status: SHIPPED | OUTPATIENT
Start: 2017-12-01 | End: 2018-08-28

## 2017-11-30 RX ORDER — NITROGLYCERIN 0.4 MG/1
TABLET SUBLINGUAL
Qty: 25 TABLET | Refills: 3 | Status: SHIPPED | OUTPATIENT
Start: 2017-11-30

## 2017-11-30 RX ADMIN — NEBIVOLOL HYDROCHLORIDE 5 MG: 5 TABLET ORAL at 09:53

## 2017-11-30 RX ADMIN — INSULIN ASPART 1 UNITS: 100 INJECTION, SOLUTION INTRAVENOUS; SUBCUTANEOUS at 09:54

## 2017-11-30 RX ADMIN — CLONIDINE HYDROCHLORIDE 0.1 MG: 0.1 TABLET ORAL at 09:52

## 2017-11-30 RX ADMIN — PREDNISONE 20 MG: 20 TABLET ORAL at 09:54

## 2017-11-30 RX ADMIN — CLOPIDOGREL 75 MG: 75 TABLET, FILM COATED ORAL at 09:53

## 2017-11-30 RX ADMIN — LOSARTAN POTASSIUM 50 MG: 50 TABLET ORAL at 09:54

## 2017-11-30 RX ADMIN — ASPIRIN 81 MG: 81 TABLET, COATED ORAL at 09:54

## 2017-11-30 RX ADMIN — AMLODIPINE BESYLATE 10 MG: 10 TABLET ORAL at 09:54

## 2017-11-30 NOTE — DISCHARGE SUMMARY
"[facility]  Discharge Summary        Cayden Dewitt MRN# [id number]   YOB: 1935 Age: 82 year old     Date of Admission:  11/28/2017  Date of Discharge:  11/30/2017  Admitting Physician:  Moise Russo MD  Discharge Physician: Moise Russo MD  Discharging Service: Hospitalist     Primary Provider:  Kashif Nassar  Primary Care Physician Phone Number: [phone]         Discharge Diagnoses/Problem Oriented Hospital Course (Providers):    Cayden Dewitt was admitted on 11/28/2017 by Moise Russo MD and I would refer you to their history and physical.  The following problems were addressed during his hospitalization:    ASSESSMENT/PLAN:  Mr. Cayden Dewitt is an 82-year-old male with a past medical history significant for essential hypertension, diabetes mellitus type 2 and mild intermittent asthma who presents with worsening chest pain over the past 24 hours.  The patient is being admitted for further evaluation and management.         1.  NSTEMI.    The patient reports worsening chest pain over the past 24 hours in association with development of upper respiratory illness.          -  Heparin infusion was initiated in the Emergency Department for ACS.  We will continue heparin infusion.  Pharmacy to dose.          -  troponin peaked to 8.2       - Resume cardiac medications including ARB, losartan.        - continue Lipitor 20 mg daily.        -The patient was premedicated for dye allergy with prednisone and is the reason why his wbc is elevated.       - Fasting lipid panel , HDL 43, LDL 95,        - Echo shows EF 40-45% with wall motion abnormalities.       - LHC showed  \" ASA to ramus (3.5 X 16mm Promus Premier)  ASA to proxima PDA (2.25 X 28mm Promus Premier)  Ongoing 70% ostial diagonal--small size--medical therapy; D2 closed(not on angiogram result)  LAD 30%\"  Asa/Plavix one year      2.  Essential hypertension.        -  Resume prior " to-admission amlodipine, clonidine and losartan        -  Nebivilol 5 mg has been added.      3.  Diabetes mellitus type 2, controlled.        - resume metformin tomorrow, increased dosage to help further weight loss.        - mod cho diet       - Hemoglobin A1c is 6.0.       4.  Mild intermittent asthma, stable.                           - The patient reports use of an albuterol inhaler in the distant past.            Code Status:      Full Code         Important Results:      See below         Pending Results:        Unresulted Labs Ordered in the Past 30 Days of this Admission     No orders found from 9/29/2017 to 11/29/2017.               Discharge Instructions and Follow-Up:      Follow-up Appointments     Follow-up and recommended labs and tests        Follow up with primary care provider, Kashif Nassar, within 7 days for   hospital follow- up.  No follow up labs or test are needed.  Follow up with cardiology as directed                         Discharge Disposition:      Discharged to home         Discharge Medications:        Current Discharge Medication List      START taking these medications    Details   aspirin EC 81 MG EC tablet Take 1 tablet (81 mg) by mouth daily    Associated Diagnoses: NSTEMI (non-ST elevated myocardial infarction) (H)      nitroGLYcerin (NITROSTAT) 0.4 MG sublingual tablet For chest pain place 1 tablet under the tongue every 5 minutes for 3 doses. If symptoms persist 5 minutes after 1st dose call 911.  Qty: 25 tablet, Refills: 3    Associated Diagnoses: NSTEMI (non-ST elevated myocardial infarction) (H)      atorvastatin (LIPITOR) 20 MG tablet Take 1 tablet (20 mg) by mouth daily  Qty: 30 tablet, Refills: 3    Associated Diagnoses: NSTEMI (non-ST elevated myocardial infarction) (H)      nebivolol (BYSTOLIC) 5 MG tablet Take 1 tablet (5 mg) by mouth daily  Qty: 30 tablet, Refills: 3    Associated Diagnoses: NSTEMI (non-ST elevated myocardial infarction) (H)      clopidogrel  (PLAVIX) 75 MG tablet Take 1 tablet (75 mg) by mouth daily  Qty: 30 tablet, Refills: 11    Associated Diagnoses: NSTEMI (non-ST elevated myocardial infarction) (H)         CONTINUE these medications which have CHANGED    Details   metFORMIN (GLUCOPHAGE) 1000 MG tablet Take 1 tablet (1,000 mg) by mouth 2 times daily (with meals)  Qty: 60 tablet, Refills: 0    Associated Diagnoses: Type 2 diabetes mellitus with hyperglycemia, without long-term current use of insulin (H)      amLODIPine (NORVASC) 10 MG tablet Take 1 tablet (10 mg) by mouth daily  Qty: 30 tablet, Refills: 1    Associated Diagnoses: Essential hypertension         CONTINUE these medications which have NOT CHANGED    Details   CLONIDINE HCL PO Take 0.1 mg by mouth 2 times daily       Multiple Vitamins-Minerals (CENTRUM SILVER) per tablet Take 1 tablet by mouth daily      LOSARTAN POTASSIUM PO Take 50 mg by mouth daily      ketoconazole (NIZORAL) 2 % cream Apply topically daily as needed (Apply to rash on feet)      terbinafine (LAMISIL) 250 MG tablet Take 250 mg by mouth daily         STOP taking these medications       HYDROCHLOROTHIAZIDE PO Comments:   Reason for Stopping:                    Allergies:         Allergies   Allergen Reactions     Ciprofloxacin      Contrast Dye      Facial and lip swelling     Iodine             Consultations This Hospital Stay:      Consultation during this admission received from cardiology          Discharge Orders for Skilled Facility (from Discharge Orders):        After Care Instructions     Activity       Your activity upon discharge: activity as tolerated            Diet       Follow this diet upon discharge: Orders Placed This Encounter      Combination Diet 2983-6154 Calories: High Consistent CHO (4-7 CHO units/meal); Low Saturated Fat Na <2400mg Diet                    Future tests that were ordered for you     Basic metabolic panel           ALT       Last Lab Result: ALT (U/L)       Date                      Value                 11/28/2017               31               ----------            Lipid Profile                        Rehab orders for Skilled Facility (from Discharge Orders):      Referrals     Future Labs/Procedures    Follow-Up with Cardiac Advanced Practice Provider     Follow-Up with Cardiologist     CARDIAC REHAB REFERRAL     Comments:    Your provider has referred you to: [facility] [place] [facility]   Cardiop[facility] - [place] [phone]     [email or web address]    Cardiac Rehab Referral     Comments:    Your provider has referred you to: [facility]: [facility]   Cardiopulmonary Rehabilitation - [place] [phone]     [email or web address]             Discharge Time:       Greater than 30 minutes.        Image Results From This Hospital Stay (For Non-EPIC Providers):        Results for orders placed or performed during the hospital encounter of 11/28/17   XR Chest 2 Views    Narrative    CHEST TWO VIEWS November 28, 2017 11:47 AM    HISTORY: Productive cough, chest pain.    COMPARISON: 2/13/2014.      Impression    IMPRESSION: Negative.     KINGSLEY PATTERSON MD           Most Recent Lab Results In EPIC (For Non-EPIC Providers):    Most Recent 3 CBC's:  Recent Labs   Lab Test  11/30/17   0620  11/29/17   0600  11/28/17   1131   WBC  15.2*  10.5  10.0   HGB  14.3  14.2  15.1   MCV  87  87  87   PLT  196  205  209      Most Recent 3 BMP's:  Recent Labs   Lab Test  11/30/17   0620  11/29/17   0600  11/28/17   1131   NA  142  140  138   POTASSIUM  3.8  4.0  3.5   CHLORIDE  108  106  101   CO2  25  26  27   BUN  19  16  17   CR  0.75  0.72  0.76   ANIONGAP  9  8  10   MACIEJ  8.5  8.4*  9.3   GLC  152*  194*  242*     Most Recent 3 Troponin's:  Recent Labs   Lab Test  11/29/17   0600  11/28/17   2355  11/28/17   1920   TROPI  7.374*  8.262*  7.103*     Most Recent 3 INR's:No lab results found.  Most Recent 2  LFT's:  Recent Labs   Lab Test  11/28/17   1131   AST  21   ALT  31   ALKPHOS  58   BILITOTAL  0.5     Most Recent Cholesterol Panel:  Recent Labs   Lab Test  11/29/17   0600   CHOL  161   LDL  95   HDL  43   TRIG  116     Most Recent 6 Bacteria Isolates From Any Culture (See EPIC Reports for Culture Details):No lab results found.  Most Recent TSH, T4 and HgbA1c:  Recent Labs   Lab Test  11/28/17   1131   A1C  6.0

## 2017-11-30 NOTE — PLAN OF CARE
Problem: Patient Care Overview  Goal: Plan of Care/Patient Progress Review  Outcome: Adequate for Discharge Date Met: 11/30/17  VSS, not in pain at time of discharge. Pt states all belongings and paperwork are accounted for. Medications have been printed and given to pt to fill at outpatient pharmacy. Discharge education complete including meds, follow up and all instructions. Emphasis on CAD management and post angio discharge care. No further questions on discharge information. Family here to drive pt home.

## 2017-11-30 NOTE — PROVIDER NOTIFICATION
Post cath lab angio TR band good I/O drinking lots fluids, received post angio orders of 75 ml/hr until 2100 this evening. RN requesting this IV NS order discontinued. Waiting for call back from Hospitalist. VSS, O2 sats 97% RA. Denies pain.

## 2017-11-30 NOTE — TELEPHONE ENCOUNTER
Received order from Dr. Russo to discontinue Bystolic due to insurance rejection and replace with Coreg 6.25mg BID.  [facility] will update patient.  Blaire Ambrocio RN  [facility]

## 2017-11-30 NOTE — PROGRESS NOTES
11/30/17 0800   Quick Adds   Type of Visit Initial PT Evaluation   Living Environment   Lives With spouse   Living Arrangements house   Home Accessibility stairs to enter home;stairs within home;bed and bath on same level   Number of Stairs to Enter Home 4   Number of Stairs Within Home 15  (railing to basement)   Transportation Available car;family or friend will provide   Self-Care   Dominant Hand right   Usual Activity Tolerance good   Current Activity Tolerance moderate   Regular Exercise no   Equipment Currently Used at Home cane, straight   Functional Level Prior   Ambulation 1-->assistive equipment   Transferring 0-->independent   Toileting 0-->independent   Bathing 0-->independent   Dressing 0-->independent   Eating 0-->independent   Communication 0-->understands/communicates without difficulty   Swallowing 0-->swallows foods/liquids without difficulty   Cognition 0 - no cognition issues reported   Fall history within last six months no   Which of the above functional risks had a recent onset or change? (activity tolerance)   Prior Functional Level Comment Pt reports he lives with his spouse in a house with 4 steps to enter, full flight to basement laundry but spouse primarily does laundry. Pt reports Mod I with SEC for functional mobility d/t R knee pain. Pt reports IND with ADLs/IADLs including driving, working part time.    General Information   Onset of Illness/Injury or Date of Surgery - Date 11/29/17   Referring Physician Armando Vazquez MD   Patient/Family Goals Statement Home   Pertinent History of Current Problem (include personal factors and/or comorbidities that impact the POC) Pt is an 83yo male admitted with NSTEMI and now seen POD#1 s/p ASA x1 to proximal Ramus and ASA x1 to proximal PDA. Trop peak 8.262. PMH significant for DM Type II and hypertension.   Precautions/Limitations fall precautions   General Observations Pt resting supine in bed   Cognitive Status Examination   Orientation  "orientation to person, place and time   Level of Consciousness alert   Follows Commands and Answers Questions 100% of the time;able to follow single-step instructions   Personal Safety and Judgment intact   Pain Assessment   Patient Currently in Pain No   Posture    Posture Forward head position;Protracted shoulders   Range of Motion (ROM)   ROM Comment BLE WNL except R knee limited by pain (chronic)   Strength   Strength Comments BLE WFL for funtional mobility, apperas L slightly stronger than R   Bed Mobility   Bed Mobility Comments IND supine<>sit   Transfer Skills   Transfer Comments Mod I sit<>stand with SEC   Gait   Gait Comments Mod I for gait 50' with SEC on L (d/t R radial site)   General Therapy Interventions   Planned Therapy Interventions progressive activity/exercise;home program guidelines;risk factor education;strengthening;gait training   Clinical Impression   Criteria for Skilled Therapeutic Intervention yes, treatment indicated   PT Diagnosis Decreased activity tolerance   Influenced by the following impairments Fatigue, decreased activity tolerance   Functional limitations due to impairments Decreased tolerance for functional mobility   Clinical Presentation Stable/Uncomplicated   Clinical Presentation Rationale clinically improving   Clinical Decision Making (Complexity) Low complexity   Therapy Frequency` 2 times/day   Predicted Duration of Therapy Intervention (days/wks) 3   Anticipated Discharge Disposition Home with Outpatient Therapy   Risk & Benefits of therapy have been explained Yes   Patient, Family & other staff in agreement with plan of care Yes   [facility] True[employer] TM \"6 Clicks\"   2016, Trustees of [facility], under license to [employer].  All rights reserved.   6 Clicks Short Forms Basic Mobility Inpatient Short Form   [facility] [email or web address]  \"6 Clicks\" V.2 Basic Mobility Inpatient Short Form   1. Turning from your back to your side while in a flat bed without using " bedrails? 4 - None   2. Moving from lying on your back to sitting on the side of a flat bed without using bedrails? 4 - None   3. Moving to and from a bed to a chair (including a wheelchair)? 4 - None   4. Standing up from a chair using your arms (e.g., wheelchair, or bedside chair)? 4 - None   5. To walk in hospital room? 4 - None   6. Climbing 3-5 steps with a railing? 4 - None   Basic Mobility Raw Score (Score out of 24.Lower scores equate to lower levels of function) 24   Total Evaluation Time   Total Evaluation Time (Minutes) 12

## 2017-11-30 NOTE — PROGRESS NOTES
Met with patient re: follow up appointments with PCP and Cardiology.  Patient is aware that he needs to establish a new PCP due to his retiring. Patient has seen Inna Quezada in the past and will f/u with her next week and then establish with new PCP.

## 2017-11-30 NOTE — PLAN OF CARE
Problem: Patient Care Overview  Goal: Plan of Care/Patient Progress Review  Outcome: Improving  VSS R radial angio site CDI with TR still inflated to 12 mls of air. Eating, voiding, no complaints. Uses R hand for cane when up-needs help to adjust to left handed cane use for a few days. CR to see tomorrow.

## 2017-11-30 NOTE — PLAN OF CARE
"Problem: Patient Care Overview  Goal: Plan of Care/Patient Progress Review  PT/CR: CR orders received, evaluation completed, treatment initiated. Pt is an 81yo male admitted with NSTEMI and now seen POD#1 s/p ASA x1 to proximal Ramus and ASA x1 to proximal PDA. Trop peak 8.262. PMH significant for DM Type II and hypertension. Pt reports he lives with his spouse in a house with 4 steps to enter, full flight to basement laundry but spouse primarily does laundry. Pt reports Mod I with SEC for functional mobility d/t R knee pain. Pt reports IND with ADLs/IADLs including driving, working part time.     Discharge Planner PT   Patient plan for discharge: Home today  Current status: Resting vitals: /91, HR 74, SpO2 97% on RA. Pt distractable at times and difficult to redirect during session. Pt demonstrates IND/Mod I with all mobility including bed mobility, transfers, gait, stairs. Pt tolerated 6 min ambulation and 4 min NuStep with stable CV response, OMNI effort scale 4/10. Activity vitals: /78, HRmax 114, SpO2 96% on RA. Pt reports \"mild\" SOB with activity, RN updated. Initiated CR education including OP CR Phase II, activity guidelines, symptoms of activity intolerance, OMNI effort scale, dietary and behavioral changes, and personal modifiable CV risk factors. Session limited as pt refusing further activity d/t wanting to eat his breakfast. Encouraged pt to ambulate with nursing later today.  Barriers to return to prior living situation: Decreased activity tolerance  Recommendations for discharge: Home with OP CR at [facility] (order entered)  Rationale for recommendations: From a mobility standpoint, pt appears safe to discharge home. Pt would benefit from continued IP CR and at discharge OP CR to continue to safely progress activity tolerance and heart health education.       Entered by: Emilee Estevez 11/30/2017 8:56 AM         Physical Therapy Discharge Summary    Reason for therapy discharge:    Discharged " to home with outpatient therapy.    Progress towards therapy goal(s). See goals on Care Plan in [facility] electronic health record for goal details.  Goals not met.  Barriers to achieving goals:   discharge on same date as initial evaluation.    Therapy recommendation(s):    Continued therapy is recommended.  Rationale/Recommendations:  OP CR to safely progress activity tolerance and continue heart health education.

## 2017-11-30 NOTE — CONSULTS
"NUTRITION EDUCATION    REASON FOR ASSESSMENT:  Nutrition education on American Heart Association (AHA) Heart Healthy Diet.    NUTRITION HISTORY:  Visited with pt this morning  Pt with many questions - visit was spent answering his specific diet questions (added sugars, CHOs, fiber, what I eat for my meals...)  Has been educated on DM diet guidelines  Pt has been trying to limit sugar - \"I do love candy though\"  Eats spicy peppers everyday  Likes to have fresh fruit  Admits that he hasn't been mindful of the Na in his diet - \"guess I need to get rid of those TV dinners - they have ~980mg Na\"      11/28: HgbA1C 6.0      CURRENT DIET ORDER:  High CHO, 2400mg Na, LSF    NUTRITION DIAGNOSIS:  Food- and nutrition-related knowledge deficit R/t no prior heart healthy diet teaching AEB pt with multiple questions     INTERVENTIONS:  Nutrition Prescription:    Recommended AHA Heart Healthy Diet    Implementation:     Nutrition Education (Content):  a) reviewed AHA Heart Healthy Diet guidelines    Nutrition Education (Application):  a) Discussed current eating habits and recommended alternative food choices    Anticipate good compliance    Diet Education - refer to Education flowsheet    Goals:    Patient verbalizes understanding of diet     All of the above goals met during education session    Follow Up/Monitoring:    Pt will attend Nutrition classes during outpt Cardiac Rehab        "

## 2017-11-30 NOTE — TELEPHONE ENCOUNTER
Reason for Disposition    Pharmacy calling with prescription questions and triager unable to answer question    Protocols used: MEDICATION QUESTION CALL-ADULT-AH    New medication Bystolic prescribed by Dr. Collins not covered by insurance company. Paged Dr. Ladd to [phone] via answering service.  Blaire Ambrocio RN  [facility]

## 2017-11-30 NOTE — PROGRESS NOTES
[facility]    Cardiology Progress Note    Date of Service: 11/30/2017  25 min visit today by roopa    Assessment & Plan   Cayden Dewitt is a 82 year old male with past medical history of hypertension, dyslipidemia, asthma.  This patient was admitted on 11/28/2017 with symptoms of wheezing, coughing and band of pain across chest.    1. Chest pain/NONSTEMI  Trop peak 8  Angiogram (right radial artery)  ASA to ramus (3.5 X 16mm Promus Premier)  ASA to proxima PDA (2.25 X 28mm Promus Premier)  Asa/Plavix one year  Ongoing 70% ostial diagonal--small size--medical therapy; D2 closed(not on angiogram result)  LAD 30%  Premedicated for dye allergy with prednisone/benadryl-had no rxn to dye  LVEDP 20  Echo: ef 45% with anterior, lateral, inferolateral WMA--suggests multivessel disease and prior MI  Amlodipine  Losartan  Bystolic  Statin  Normal renal function  hgb 14.3  Cardiac rehab-OP  HOME okay today  I have scheduled follow up with me 12-13 with bmp before  See Dr. Collins 8 weeks with lipids    2. HTN-improved control  On losartan/amlodipine PTA with uncontrolled bp's  Added bystolic(asthma)  bp better      3 dilated asc aorta  44mm--will do echo one year to follow    4 dyslipidemia  Lipitor 40mg daily added  LDL 95    5. DM-a1c 6.0  On metformin-will need to be held 48 hours post dye load  Creat normal  Restart tomorrow        Interval History   No chest pain or sob    Review of Systems:  The Review of Systems is negative other than noted in the HPI    Physical Exam   Temp: 97.7  F (36.5  C) Temp src: Oral BP: 123/79   Heart Rate: 67 Resp: 16 SpO2: 98 % O2 Device: None (Room air)    Vitals:    11/28/17 1100 11/29/17 0435 11/30/17 0616   Weight: 88.5 kg (195 lb) 93.6 kg (206 lb 6.4 oz) 95.9 kg (211 lb 6.7 oz)     Vital Signs with Ranges  Temp:  [97.5  F (36.4  C)-98.1  F (36.7  C)] 97.7  F (36.5  C)  Heart Rate:  [67-86] 67  Resp:  [16-20] 16  BP: (112-148)/() 123/79  SpO2:  [93 %-98 %] 98  %  I/O last 3 completed shifts:  In: 1157.5 [P.O.:480; I.V.:677.5]  Out: 1625 [Urine:1625]    Constitutional     alert and oriented, in no acute distress.     Skin     warm and dry to touch    ENT     no pallor or cyanosis    Neck    Supple, JVP normal, no carotid bruit    Chest     no tenderness to palpation     Lungs  clear to auscultation    Cardiac  regular rhythm, S1 normal, S2 normal, No S3 or S4, no murmurs, no rubs  Distant HT    Abdomen     abdomen soft, bowel sounds normoactive, no hepatosplenomegaly    Extremities and Back     no clubbing, cyanosis. No edema observed.  Right radial site clean without hum or bruit      Neurological     no gross motor deficits noted, affect appropriate, oriented to time, person and place.        Medications     Percutaneous Coronary Intervention orders placed (this is information for BPA alerting)       - MEDICATION INSTRUCTIONS -       - MEDICATION INSTRUCTIONS -       Continuing ACE inhibitor/ARB/ARNI from home medication list OR ACE inhibitor/ARB order already placed during this visit       - MEDICATION INSTRUCTIONS -       - MEDICATION INSTRUCTIONS -       Reason beta blocker order not selected         sodium chloride (PF)  3 mL Intracatheter Q8H     aspirin EC  81 mg Oral Daily     atorvastatin  20 mg Oral Daily     clopidogrel  75 mg Oral Daily     nitroGLYcerin   Transdermal Once     diphenhydrAMINE  50 mg Oral Once     amLODIPine (NORVASC) tablet 10 mg  10 mg Oral Daily     cloNIDine (CATAPRES) tablet 0.1 mg  0.1 mg Oral BID     losartan (COZAAR) tablet 50 mg  50 mg Oral Daily     nebivolol  5 mg Oral Daily     insulin aspart  1-6 Units Subcutaneous Q4H          Data:     ROUTINE IP LABS (Last four results)  BMP    Recent Labs  Lab 11/30/17  0620 11/29/17  0600 11/28/17  1131    140 138   POTASSIUM 3.8 4.0 3.5   CHLORIDE 108 106 101   MACIEJ 8.5 8.4* 9.3   CO2 25 26 27   BUN 19 16 17   CR 0.75 0.72 0.76   * 194* 242*     CHOLESTEROL/HEPATIC    Recent  Labs  Lab 11/29/17  0600 11/28/17  1131   CHOL 161  --    TRIG 116  --    LDL 95  --    HDL 43  --    ALT  --  31   AST  --  21     CBC    Recent Labs  Lab 11/30/17  0620 11/29/17  0600 11/28/17  1131   WBC 15.2* 10.5 10.0   RBC 4.76 4.68 4.99   HGB 14.3 14.2 15.1   HCT 41.6 40.9 43.5   MCV 87 87 87   MCH 30.0 30.3 30.3   MCHC 34.4 34.7 34.7   RDW 13.4 13.2 13.4    205 209     TROP:     Recent Labs  Lab 11/29/17  0600 11/28/17  2355 11/28/17  1920 11/28/17  1535 11/28/17  1131   TROPI 7.374* 8.262* 7.103* 2.836* 0.332*      BNP:  No results for input(s): NTBNPI in the last 168 hours.  INRNo lab results found in last 7 days.  No results found for: TSH    EKG results:  Reviewed if available     Imaging:  No results found for this or any previous visit (from the past 24 hour(s)).  Telemetry: sinus with IVCD

## 2017-11-30 NOTE — DISCHARGE INSTRUCTIONS
Cardiac Angiogram Discharge Instructions - Radial    After you go home:      Have an adult stay with you until tomorrow.    Drink extra fluids for 2 days.    You may resume your normal diet.    No smoking       For 24 hours - due to the sedation you received:    Relax and take it easy.    Do NOT make any important or legal decisions.    Do NOT drive or operate machines at home or at work.    Do NOT drink alcohol.    Care of Wrist Puncture Site:      For the first 24 hrs - check the puncture site every 1-2 hours while awake.    It is normal to have soreness at the puncture site and mild tingling in your hand for up to 3 days.    Remove the bandaid after 24 hours. If there is minor oozing, apply another bandaid and remove it after 12 hours.    You may shower tomorrow.  Do NOT take a bath, or use a hot tub or pool for at least 3 days. Do NOT scrub the site. Do not use lotion or powder near the puncture site.           Activity:        For 2 days:     do not use your hand or arm to support your weight (such as rising from a chair)     do not bend your wrist (such as lifting a garage door).    do not lift more than 5 pounds or exercise your arm (such as tennis, golf or bowling).    Do NOT do any heavy activity such as exercise, lifting, or straining.     Bleeding:      If you start bleeding from the site in your wrist, sit down and press firmly on/above the site for 10 minutes.     Once bleeding stops, keep arm still for 2 hours.     Call [facility] as soon as you can.       Call 911 right away if you have heavy bleeding or bleeding that does not stop.      Medicines:      If you are taking antiplatelet medications such as Plavix, Brilinta or Effient, do not stop taking it until you talk to your cardiologist.        If you are on Metformin (Glucophage), do not restart it until you have blood tests (within 2 to 3 days after discharge).  After you have your blood drawn, you may restart the Metformin.     Take your  medications, including blood thinners, unless your provider tells you not to.  If you take Coumadin (Warfarin), have your INR checked by your provider in  3-5 days. Call your clinic to schedule this.    If you have stopped any medicines, check with your provider about when to restart them.    Follow Up Appointments:      Follow up with [facility] Nurse Practitioner at [facility] of patient preference in 7-10 days.    Call the clinic if:      You have a large or growing hard lump around the site.    The site is red, swollen, hot or tender.    Blood or fluid is draining from the site.    You have chills or a fever greater than 101 F (38 C).    Your arm turns feels numb, cool or changes color.    You have hives, a rash or unusual itching.    Any questions or concerns.          [facility] [place]:    [phone] [facility] (7 days a week)

## 2017-11-30 NOTE — TELEPHONE ENCOUNTER
Pharmacy unable to give a dose equivalent for Bystolic as requested by Dr. HARIS Russo via answering service to [phone]

## 2017-11-30 NOTE — PROVIDER NOTIFICATION
Brief update:    Pt completed post-cath IVFs.     Discontinued continuous fluids as pt with adequate oral intake, mild systolic heart failure by TTE.    Karan Bae MD   10:37 PM

## 2017-12-01 ENCOUNTER — CARE COORDINATION (OUTPATIENT)
Dept: CARDIOLOGY | Facility: CLINIC | Age: 82
End: 2017-12-01

## 2017-12-01 LAB
INTERPRETATION ECG - MUSE: NORMAL

## 2017-12-01 NOTE — PROGRESS NOTES
Patient was evaluated by cardiology while inpatient for NSTEM. Patient underwent coronary angiogram which resulted in ASA to ramus (3.5 X 16mm Promus Premier) and ASA to proxima PDA (2.25 X 28mm Promus Premier). Called patient and left  to discuss any post hospital d/c questions he may have, review medication changes, and confirm f/u appts. RN advised patient in  that he has an apt scheduled on 12/7/17 for OP cardiac rehab and on 12/13/17 with MIRNA Laure Hughes (1:30pm lab and 2:30pm with MIRNA). Patient advised in  to call clinic with any cardiac related questions or concerns prior to his apt on 12/13/17.

## 2017-12-13 ENCOUNTER — OFFICE VISIT (OUTPATIENT)
Dept: CARDIOLOGY | Facility: CLINIC | Age: 82
End: 2017-12-13
Payer: COMMERCIAL

## 2017-12-13 VITALS
DIASTOLIC BLOOD PRESSURE: 70 MMHG | WEIGHT: 205 LBS | HEART RATE: 69 BPM | HEIGHT: 69 IN | BODY MASS INDEX: 30.36 KG/M2 | SYSTOLIC BLOOD PRESSURE: 118 MMHG

## 2017-12-13 DIAGNOSIS — I24.9 ACS (ACUTE CORONARY SYNDROME) (H): ICD-10-CM

## 2017-12-13 DIAGNOSIS — I21.4 NSTEMI (NON-ST ELEVATED MYOCARDIAL INFARCTION) (H): ICD-10-CM

## 2017-12-13 DIAGNOSIS — I25.10 CORONARY ARTERY DISEASE INVOLVING NATIVE CORONARY ARTERY OF NATIVE HEART WITHOUT ANGINA PECTORIS: Primary | ICD-10-CM

## 2017-12-13 DIAGNOSIS — E78.2 MIXED HYPERLIPIDEMIA: ICD-10-CM

## 2017-12-13 DIAGNOSIS — I10 BENIGN ESSENTIAL HTN: ICD-10-CM

## 2017-12-13 LAB
ANION GAP SERPL CALCULATED.3IONS-SCNC: 13.3 MMOL/L (ref 6–17)
BUN SERPL-MCNC: 19 MG/DL (ref 7–30)
CALCIUM SERPL-MCNC: 9.7 MG/DL (ref 8.5–10.5)
CHLORIDE SERPL-SCNC: 100 MMOL/L (ref 98–107)
CO2 SERPL-SCNC: 27 MMOL/L (ref 23–29)
CREAT SERPL-MCNC: 1 MG/DL (ref 0.7–1.3)
GFR SERPL CREATININE-BSD FRML MDRD: 72 ML/MIN/1.7M2
GLUCOSE SERPL-MCNC: 134 MG/DL (ref 70–105)
POTASSIUM SERPL-SCNC: 4.3 MMOL/L (ref 3.5–5.1)
SODIUM SERPL-SCNC: 136 MMOL/L (ref 136–145)

## 2017-12-13 PROCEDURE — 36415 COLL VENOUS BLD VENIPUNCTURE: CPT | Performed by: NURSE PRACTITIONER

## 2017-12-13 PROCEDURE — 80048 BASIC METABOLIC PNL TOTAL CA: CPT | Performed by: NURSE PRACTITIONER

## 2017-12-13 PROCEDURE — 99215 OFFICE O/P EST HI 40 MIN: CPT | Performed by: NURSE PRACTITIONER

## 2017-12-13 RX ORDER — CLOPIDOGREL BISULFATE 75 MG/1
75 TABLET ORAL DAILY
Qty: 90 TABLET | Refills: 3 | Status: SHIPPED | OUTPATIENT
Start: 2017-12-13 | End: 2019-02-08

## 2017-12-13 RX ORDER — LOSARTAN POTASSIUM 50 MG/1
50 TABLET ORAL DAILY
COMMUNITY
End: 2018-08-28

## 2017-12-13 RX ORDER — ATORVASTATIN CALCIUM 20 MG/1
20 TABLET, FILM COATED ORAL DAILY
Qty: 90 TABLET | Refills: 3 | Status: SHIPPED | OUTPATIENT
Start: 2017-12-13 | End: 2018-08-28

## 2017-12-13 RX ORDER — CARVEDILOL 6.25 MG/1
6.25 TABLET ORAL 2 TIMES DAILY WITH MEALS
Qty: 180 TABLET | Refills: 3 | Status: SHIPPED | OUTPATIENT
Start: 2017-12-13 | End: 2018-11-07 | Stop reason: DRUGHIGH

## 2017-12-13 NOTE — LETTER
12/13/2017    Inna Quezada  [facility] [address]  [facility] [address]    RE: Cayden Dewitt       Dear Colleague,    I had the pleasure of seeing Cayden Dewitt in the [facility].    History of Present Illness:    Cayden Dewitt is a 82 year old male followed here by Dr. Collins.  He returns today with his wife for post hospital follow-up.  On 11-28, 2017 he was admitted to [facility].  He reported a history of bronchitis with wheezing.  He then developed a band of pain across his anterior chest that felt slightly worse when he pressed on it and worse when he coughed.    When he presented to the emergency room he had a mildly elevated troponin which peaked at 8.  He was premedicated for dye allergy with prednisone and Benadryl.  Echocardiography showed an ejection fraction of 45% with anterior, lateral, inferolateral wall motion abnormality suggestive of multivessel disease.  He was taken to the cardiac Cath Lab where he was found to have a 90% lesion in the ramus stented with a 3.5 x 16 mm Promus Premier drug-eluting stent as well as a 90% PDA lesion stented with a 2.25 x 28 mm Promus Premier drug-eluting stent.  He has ongoing 70% ostial diagonal and a small vessel which we will treat medically.  A second diagonal that was closed and not reported on the angiogram result.  The LAD was 30% diseased.  LV and diastolic pressure was 20.    Due to history of asthma he was placed on Bystolic.  I believe due to nonformulary issues this was changed to primary care and [facility] to carvedilol.  He is tolerating this well so far.    He was found to have a dilated ascending aorta at 44 mm.  He will have an echocardiogram in 1 year to follow this.    His LDL was 95 he was placed on Lipitor 40 mg per day and we will recheck his lab work when he returns to see us in March.    Since being home is been doing well.  He states that on his  new medications he feels calmer.  He is attended cardiac rehab and doing fine other than some light headedness that is mild post exercise.  With his aorta dilated we would like his blood pressures around 120 systolic if he tolerates this.    He has had no recurrence of his chest discomfort.  He denies shortness of breath.    His right radial site has healed well with no hum or bruit.  He is taking his medications as prescribed.  We reviewed the need to continue aspirin with Plavix for 1 year post stenting without interruption.  Other physical exam findings notes some mild lower extremity edema with mild venous stasis changes.  I suspect this is from high-dose amlodipine which he has been on for a number of years.    His basic metabolic panel today is normal with the exception of his glucose which is 134.  Potassium is 4.3, sodium 136, BUN 19 creatinine 1.0.    He has multiple questions today which I did the best of my ability to answer.        Impression/Plan:    1.  Chest pain with a non-STEMI, peak troponin of 8.  Status post Promus Premier drug-eluting stents to the ramus, proximal posterior descending artery, undergoing ostial diagonal stenosis of 70% which is a small vessel-treat medically.  A close second diagonal and a 30% LAD.  Continue medical therapy including aspirin and Plavix together for 1 year.  Continue cardiac rehab. See Dr. Collins back in February.    2.  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  He is euvolemic today.  I reviewed the need for low-sodium diet good blood pressure control and ongoing cardiac rehab.  We had him on Bystolic in the hospital which has subsequently been changed to Coreg I believe due to [facility] formulary adjustments.  So far is tolerating this well without wheezing or side effects.  His blood pressure is controlled.  Repeat echo in February    3.  Hypertension-reasonable control    4.  Enlarged ascending aorta he will need an echo in a year.    5.   Dyslipidemia-placed on Lipitor 40 mg daily LDL 95.  Lipids will be reassessed at his visit in February. 6.  Diabetes mellitus A1c 6% continue metformin.        It has been a pleasure seeing Cayden in follow-up today he had multiple questions.  Greater than 50% of this 45 minute visit today was spent in counseling     Laure Hughes, MSN, APRN-BC, CNP  Cardiology    Orders Placed This Encounter   Procedures     Echocardiogram     Orders Placed This Encounter   Medications     losartan (COZAAR) 50 MG tablet     Sig: Take 50 mg by mouth daily     clopidogrel (PLAVIX) 75 MG tablet     Sig: Take 1 tablet (75 mg) by mouth daily     Dispense:  90 tablet     Refill:  3     atorvastatin (LIPITOR) 20 MG tablet     Sig: Take 1 tablet (20 mg) by mouth daily     Dispense:  90 tablet     Refill:  3     carvedilol (COREG) 6.25 MG tablet     Sig: Take 1 tablet (6.25 mg) by mouth 2 times daily (with meals)     Dispense:  180 tablet     Refill:  3     Medications Discontinued During This Encounter   Medication Reason     terbinafine (LAMISIL) 250 MG tablet Stopped by Patient     LOSARTAN POTASSIUM PO Medication Reconciliation Clean Up     ketoconazole (NIZORAL) 2 % cream Stopped by Patient     clopidogrel (PLAVIX) 75 MG tablet Reorder     atorvastatin (LIPITOR) 20 MG tablet Reorder     carvedilol (COREG) 6.25 MG tablet Reorder         Encounter Diagnoses   Name Primary?     NSTEMI (non-ST elevated myocardial infarction) (H)      ACS (acute coronary syndrome) (H)      Benign essential HTN      Mixed hyperlipidemia      Coronary artery disease involving native coronary artery of native heart without angina pectoris Yes       CURRENT MEDICATIONS:  Current Outpatient Prescriptions   Medication Sig Dispense Refill     losartan (COZAAR) 50 MG tablet Take 50 mg by mouth daily       clopidogrel (PLAVIX) 75 MG tablet Take 1 tablet (75 mg) by mouth daily 90 tablet 3     atorvastatin (LIPITOR) 20 MG tablet Take 1 tablet (20 mg) by  mouth daily 90 tablet 3     carvedilol (COREG) 6.25 MG tablet Take 1 tablet (6.25 mg) by mouth 2 times daily (with meals) 180 tablet 3     aspirin EC 81 MG EC tablet Take 1 tablet (81 mg) by mouth daily       nitroGLYcerin (NITROSTAT) 0.4 MG sublingual tablet For chest pain place 1 tablet under the tongue every 5 minutes for 3 doses. If symptoms persist 5 minutes after 1st dose call 911. 25 tablet 3     metFORMIN (GLUCOPHAGE) 1000 MG tablet Take 1 tablet (1,000 mg) by mouth 2 times daily (with meals) 60 tablet 0     amLODIPine (NORVASC) 10 MG tablet Take 1 tablet (10 mg) by mouth daily 30 tablet 1     CLONIDINE HCL PO Take 0.1 mg by mouth 2 times daily        Multiple Vitamins-Minerals (CENTRUM SILVER) per tablet Take 1 tablet by mouth daily       [DISCONTINUED] atorvastatin (LIPITOR) 20 MG tablet Take 1 tablet (20 mg) by mouth daily 30 tablet 3     [DISCONTINUED] clopidogrel (PLAVIX) 75 MG tablet Take 1 tablet (75 mg) by mouth daily 30 tablet 11     [DISCONTINUED] carvedilol (COREG) 6.25 MG tablet Take 1 tablet (6.25 mg) by mouth 2 times daily (with meals) 60 tablet 1     [DISCONTINUED] LOSARTAN POTASSIUM PO Take 50 mg by mouth daily         ALLERGIES     Allergies   Allergen Reactions     Ciprofloxacin      Contrast Dye      Facial and lip swelling     Iodine        PAST MEDICAL HISTORY:  Past Medical History:   Diagnosis Date     Ascending aorta dilatation (H)      Benign essential HTN      CAD (coronary artery disease)     11/29/17- ASA 3.5x16mm Promus to Ramux and ASA [id number] Promus to prox PDA     Hyperlipidemia      NSTEMI (non-ST elevated myocardial infarction) (H) 11/2017     Type 2 diabetes mellitus without complications (H)      Uncomplicated asthma        PAST SURGICAL HISTORY:  Past Surgical History:   Procedure Laterality Date     CORONARY ANGIOGRAPHY ADULT ORDER  11/29/2017 11/29/17- ASA 3.5x16mm Promus to Ramux and ASA [id number] Promus to prox PDA     GENITOURINARY SURGERY      prostate  "      FAMILY HISTORY:  Family History   Problem Relation Age of Onset     Alzheimer Disease Mother        SOCIAL HISTORY:  Social History     Social History     Marital status:      Spouse name: N/A     Number of children: N/A     Years of education: N/A     Social History Main Topics     Smoking status: Never Smoker     Smokeless tobacco: Never Used     Alcohol use No     Drug use: None     Sexual activity: Not Asked     Other Topics Concern     None     Social History Narrative       Review of Systems:  Skin:  Positive for bruising   Eyes:  Positive for glasses  ENT:  Positive for sinus trouble  Respiratory:  Positive for wheezing  Cardiovascular:    Positive for;dizziness;exercise intolerance  Gastroenterology: Negative    Genitourinary:  Positive for prostate problem  Musculoskeletal:  Positive for joint pain  Neurologic:  Negative    Psychiatric:  Negative    Heme/Lymph/Imm:  Positive for easy bruising  Endocrine:  Positive for diabetes    Physical Exam:  Vitals: /70  Pulse 69  Ht 1.753 m (5' 9\")  Wt 93 kg (205 lb)  BMI 30.27 kg/m2    Constitutional:  cooperative, alert and oriented, well developed, well nourished, in no acute distress        Skin:  warm and dry to the touch, no apparent skin lesions or masses noted        Head:  normocephalic, no masses or lesions        Eyes:  pupils equal and round;conjunctivae and lids unremarkable        ENT:  no pallor or cyanosis;dentition good   halitosis    Neck:  carotid pulses are full and equal bilaterally;JVP normal        Chest:  normal breath sounds, clear to auscultation, normal A-P diameter, normal symmetry, normal respiratory excursion, no use of accessory muscles        Cardiac: regular rhythm, normal S1/S2, no S3 or S4, apical impulse not displaced, no murmurs, gallops or rubs             distant heart tones    Abdomen:  abdomen soft, non-tender, BS normoactive, no mass, no HSM, no bruits   obese    Vascular:       3+                         "   right radial artery clean without hum or bruit    Extremities and Back:  no spinal abnormalities noted   venous stasis changes    Neurological:  no gross motor deficits          Recent Lab Results:  LIPID RESULTS:  Lab Results   Component Value Date    CHOL 161 11/29/2017    HDL 43 11/29/2017    LDL 95 11/29/2017    TRIG 116 11/29/2017       LIVER ENZYME RESULTS:  Lab Results   Component Value Date    AST 21 11/28/2017    ALT 31 11/28/2017       CBC RESULTS:  Lab Results   Component Value Date    WBC 15.2 (H) 11/30/2017    RBC 4.76 11/30/2017    HGB 14.3 11/30/2017    HCT 41.6 11/30/2017    MCV 87 11/30/2017    MCH 30.0 11/30/2017    MCHC 34.4 11/30/2017    RDW 13.4 11/30/2017     11/30/2017       BMP RESULTS:  Lab Results   Component Value Date     12/13/2017    POTASSIUM 4.3 12/13/2017    CHLORIDE 100 12/13/2017    CO2 27 12/13/2017    ANIONGAP 13.3 12/13/2017     (H) 12/13/2017    BUN 19 12/13/2017    CR 1.00 12/13/2017    GFRESTIMATED 72 12/13/2017    GFRESTBLACK 87 12/13/2017    MACIEJ 9.7 12/13/2017        A1C RESULTS:  Lab Results   Component Value Date    A1C 6.0 11/28/2017       INR RESULTS:  No results found for: INR    Thank you for allowing me to participate in the care of your patient.    Sincerely,     THOMAS Fiore CNP     [facility]

## 2017-12-13 NOTE — MR AVS SNAPSHOT
After Visit Summary   12/13/2017    Cayden Dewitt    MRN: [id number]           Patient Information     Date Of Birth          1935        Visit Information        Provider Department      12/13/2017 2:30 PM Laure Hughes APRN CNP [facility]        Today's Diagnoses     Coronary artery disease involving native coronary artery of native heart without angina pectoris    -  1    NSTEMI (non-ST elevated myocardial infarction) (H)        ACS (acute coronary syndrome) (H)        Benign essential HTN        Mixed hyperlipidemia          Care Instructions    Continue same meds    See us back in February and have echocardiogram, fasting cholesterol, and a visit with Dr. Collins          Follow-ups after your visit        Your next 10 appointments already scheduled     Feb 22, 2018  7:50 AM CST   LAB with [facility]   [facility] ([facility])    [address] [place] [id number] [phone]           Please do not eat 10-12 hours before your appointment if you are coming in fasting for labs on lipids, cholesterol, or glucose (sugar). This does not apply to pregnant women. Water, hot tea and black coffee (with nothing added) are okay. Do not drink other fluids, diet soda or chew gum.            Feb 22, 2018  8:45 AM CST   Return Visit with Magdy Collins MD   [facility] ([facility])    [address] [place] [id number] [phone]              Future tests that were ordered for you today     Open Future Orders        Priority Expected Expires Ordered    Echocardiogram Routine 2/14/2018 12/13/2018 12/13/2017            Who to contact     If you have questions or need follow up information about today's clinic visit or your schedule please contact [facility] directly at  "[phone].  Normal or non-critical lab and imaging results will be communicated to you by MyChart, letter or phone within 4 business days after the clinic has received the results. If you do not hear from us within 7 days, please contact the clinic through [facility] or phone. If you have a critical or abnormal lab result, we will notify you by phone as soon as possible.  Submit refill requests through [employer] or call your pharmacy and they will forward the refill request to us. Please allow 3 business days for your refill to be completed.          Additional Information About Your Visit        [facility] Information     [employer] lets you send messages to your doctor, view your test results, renew your prescriptions, schedule appointments and more. To sign up, go to [email or web address] . Click on \"Log in\" on the left side of the screen, which will take you to the Welcome page. Then click on \"Sign up Now\" on the right side of the page.     You will be asked to enter the access code listed below, as well as some personal information. Please follow the directions to create your username and password.     Your access code is: [email or web address]  Expires: 2018  9:33 AM     Your access code will  in 90 days. If you need help or a new code, please call your [place] clinic or [phone].        Care EveryWhere ID     This is your Care EveryWhere ID. This could be used by other organizations to access your [place] medical records  [id number]        Your Vitals Were     Pulse Height BMI (Body Mass Index)             69 1.753 m (5' 9\") 30.27 kg/m2          Blood Pressure from Last 3 Encounters:   17 118/70   17 123/79   14 130/84    Weight from Last 3 Encounters:   17 93 kg (205 lb)   17 95.9 kg (211 lb 6.7 oz)   07/15/14 93 kg (205 lb)              We Performed the Following     Follow-Up with Cardiac Advanced Practice Provider          Where to get your medicines      These " medications were sent to [place], [place] - [address]  [address], [facility] [address]     Phone:  [phone]     atorvastatin 20 MG tablet    carvedilol 6.25 MG tablet    clopidogrel 75 MG tablet          Primary Care Provider Office Phone # Fax #    Inna Quezada [phone]       [facility] [address]  [facility] [address]        Equal Access to Services     MÓNICA GREENE : Hadii aad ku hadasho Soomaali, waaxda luqadaha, qaybta kaalmada adeegyada, waxay idiin hayaan adeeg kharash la'aan . So [facility] [phone].    ATENCIÓN: Si habla español, tiene a jasmine disposición servicios gratuitos de asistencia lingüística. Llame al [phone].    We comply with applicable federal civil rights laws and [place] laws. We do not discriminate on the basis of race, color, national origin, age, disability, sex, sexual orientation, or gender identity.            Thank you!     Thank you for choosing [facility]  for your care. Our goal is always to provide you with excellent care. Hearing back from our patients is one way we can continue to improve our services. Please take a few minutes to complete the written survey that you may receive in the mail after your visit with us. Thank you!             Your Updated Medication List - Protect others around you: Learn how to safely use, store and throw away your medicines at www.disposemymeds.org.          This list is accurate as of: 12/13/17  3:26 PM.  Always use your most recent med list.                   Brand Name Dispense Instructions for use Diagnosis    amLODIPine 10 MG tablet    NORVASC    30 tablet    Take 1 tablet (10 mg) by mouth daily    Essential hypertension       aspirin 81 MG EC tablet      Take 1 tablet (81 mg) by mouth daily    NSTEMI (non-ST elevated myocardial infarction) (H)       atorvastatin 20 MG tablet    LIPITOR    90 tablet    Take  1 tablet (20 mg) by mouth daily    NSTEMI (non-ST elevated myocardial infarction) (H)       carvedilol 6.25 MG tablet    COREG    180 tablet    Take 1 tablet (6.25 mg) by mouth 2 times daily (with meals)    NSTEMI (non-ST elevated myocardial infarction) (H)       CENTRUM SILVER per tablet      Take 1 tablet by mouth daily        CLONIDINE HCL PO      Take 0.1 mg by mouth 2 times daily        clopidogrel 75 MG tablet    PLAVIX    90 tablet    Take 1 tablet (75 mg) by mouth daily    NSTEMI (non-ST elevated myocardial infarction) (H)       losartan 50 MG tablet    COZAAR     Take 50 mg by mouth daily        metFORMIN 1000 MG tablet    GLUCOPHAGE    60 tablet    Take 1 tablet (1,000 mg) by mouth 2 times daily (with meals)    Type 2 diabetes mellitus with hyperglycemia, without long-term current use of insulin (H)       nitroGLYcerin 0.4 MG sublingual tablet    NITROSTAT    25 tablet    For chest pain place 1 tablet under the tongue every 5 minutes for 3 doses. If symptoms persist 5 minutes after 1st dose call 911.    NSTEMI (non-ST elevated myocardial infarction) (H)

## 2017-12-13 NOTE — PROGRESS NOTES
History of Present Illness:    Cayden Dewitt is a 82 year old male followed here by Dr. Collins.  He returns today with his wife for post hospital follow-up.  On 11-28, 2017 he was admitted to [facility].  He reported a history of bronchitis with wheezing.  He then developed a band of pain across his anterior chest that felt slightly worse when he pressed on it and worse when he coughed.    When he presented to the emergency room he had a mildly elevated troponin which peaked at 8.  He was premedicated for dye allergy with prednisone and Benadryl.  Echocardiography showed an ejection fraction of 45% with anterior, lateral, inferolateral wall motion abnormality suggestive of multivessel disease.  He was taken to the cardiac Cath Lab where he was found to have a 90% lesion in the ramus stented with a 3.5 x 16 mm Promus Premier drug-eluting stent as well as a 90% PDA lesion stented with a 2.25 x 28 mm Promus Premier drug-eluting stent.  He has ongoing 70% ostial diagonal and a small vessel which we will treat medically.  A second diagonal that was closed and not reported on the angiogram result.  The LAD was 30% diseased.  LV and diastolic pressure was 20.    Due to history of asthma he was placed on Bystolic.  I believe due to nonformulary issues this was changed to primary care and [facility] to carvedilol.  He is tolerating this well so far.    He was found to have a dilated ascending aorta at 44 mm.  He will have an echocardiogram in 1 year to follow this.    His LDL was 95 he was placed on Lipitor 40 mg per day and we will recheck his lab work when he returns to see us in March.    Since being home is been doing well.  He states that on his new medications he feels calmer.  He is attended cardiac rehab and doing fine other than some light headedness that is mild post exercise.  With his aorta dilated we would like his blood pressures around 120 systolic if he tolerates this.    He has had  no recurrence of his chest discomfort.  He denies shortness of breath.    His right radial site has healed well with no hum or bruit.  He is taking his medications as prescribed.  We reviewed the need to continue aspirin with Plavix for 1 year post stenting without interruption.  Other physical exam findings notes some mild lower extremity edema with mild venous stasis changes.  I suspect this is from high-dose amlodipine which he has been on for a number of years.    His basic metabolic panel today is normal with the exception of his glucose which is 134.  Potassium is 4.3, sodium 136, BUN 19 creatinine 1.0.    He has multiple questions today which I did the best of my ability to answer.        Impression/Plan:    1.  Chest pain with a non-STEMI, peak troponin of 8.  Status post Promus Premier drug-eluting stents to the ramus, proximal posterior descending artery, undergoing ostial diagonal stenosis of 70% which is a small vessel-treat medically.  A close second diagonal and a 30% LAD.  Continue medical therapy including aspirin and Plavix together for 1 year.  Continue cardiac rehab. See Dr. Collins back in February.    2.  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  He is euvolemic today.  I reviewed the need for low-sodium diet good blood pressure control and ongoing cardiac rehab.  We had him on Bystolic in the hospital which has subsequently been changed to Coreg I believe due to [facility] formulary adjustments.  So far is tolerating this well without wheezing or side effects.  His blood pressure is controlled.  Repeat echo in February    3.  Hypertension-reasonable control    4.  Enlarged ascending aorta he will need an echo in a year.    5.  Dyslipidemia-placed on Lipitor 40 mg daily LDL 95.  Lipids will be reassessed at his visit in February.    6.  Diabetes mellitus A1c 6% continue metformin.        It has been a pleasure seeing Cayden in follow-up today he had multiple questions.  Greater than 50%  of this 45 minute visit today was spent in counseling     Laure Hughes, MSN, APRN-BC, CNP  Cardiology    Orders Placed This Encounter   Procedures     Echocardiogram     Orders Placed This Encounter   Medications     losartan (COZAAR) 50 MG tablet     Sig: Take 50 mg by mouth daily     clopidogrel (PLAVIX) 75 MG tablet     Sig: Take 1 tablet (75 mg) by mouth daily     Dispense:  90 tablet     Refill:  3     atorvastatin (LIPITOR) 20 MG tablet     Sig: Take 1 tablet (20 mg) by mouth daily     Dispense:  90 tablet     Refill:  3     carvedilol (COREG) 6.25 MG tablet     Sig: Take 1 tablet (6.25 mg) by mouth 2 times daily (with meals)     Dispense:  180 tablet     Refill:  3     Medications Discontinued During This Encounter   Medication Reason     terbinafine (LAMISIL) 250 MG tablet Stopped by Patient     LOSARTAN POTASSIUM PO Medication Reconciliation Clean Up     ketoconazole (NIZORAL) 2 % cream Stopped by Patient     clopidogrel (PLAVIX) 75 MG tablet Reorder     atorvastatin (LIPITOR) 20 MG tablet Reorder     carvedilol (COREG) 6.25 MG tablet Reorder         Encounter Diagnoses   Name Primary?     NSTEMI (non-ST elevated myocardial infarction) (H)      ACS (acute coronary syndrome) (H)      Benign essential HTN      Mixed hyperlipidemia      Coronary artery disease involving native coronary artery of native heart without angina pectoris Yes       CURRENT MEDICATIONS:  Current Outpatient Prescriptions   Medication Sig Dispense Refill     losartan (COZAAR) 50 MG tablet Take 50 mg by mouth daily       clopidogrel (PLAVIX) 75 MG tablet Take 1 tablet (75 mg) by mouth daily 90 tablet 3     atorvastatin (LIPITOR) 20 MG tablet Take 1 tablet (20 mg) by mouth daily 90 tablet 3     carvedilol (COREG) 6.25 MG tablet Take 1 tablet (6.25 mg) by mouth 2 times daily (with meals) 180 tablet 3     aspirin EC 81 MG EC tablet Take 1 tablet (81 mg) by mouth daily       nitroGLYcerin (NITROSTAT) 0.4 MG sublingual tablet For  chest pain place 1 tablet under the tongue every 5 minutes for 3 doses. If symptoms persist 5 minutes after 1st dose call 911. 25 tablet 3     metFORMIN (GLUCOPHAGE) 1000 MG tablet Take 1 tablet (1,000 mg) by mouth 2 times daily (with meals) 60 tablet 0     amLODIPine (NORVASC) 10 MG tablet Take 1 tablet (10 mg) by mouth daily 30 tablet 1     CLONIDINE HCL PO Take 0.1 mg by mouth 2 times daily        Multiple Vitamins-Minerals (CENTRUM SILVER) per tablet Take 1 tablet by mouth daily       [DISCONTINUED] atorvastatin (LIPITOR) 20 MG tablet Take 1 tablet (20 mg) by mouth daily 30 tablet 3     [DISCONTINUED] clopidogrel (PLAVIX) 75 MG tablet Take 1 tablet (75 mg) by mouth daily 30 tablet 11     [DISCONTINUED] carvedilol (COREG) 6.25 MG tablet Take 1 tablet (6.25 mg) by mouth 2 times daily (with meals) 60 tablet 1     [DISCONTINUED] LOSARTAN POTASSIUM PO Take 50 mg by mouth daily         ALLERGIES     Allergies   Allergen Reactions     Ciprofloxacin      Contrast Dye      Facial and lip swelling     Iodine        PAST MEDICAL HISTORY:  Past Medical History:   Diagnosis Date     Ascending aorta dilatation (H)      Benign essential HTN      CAD (coronary artery disease)     11/29/17- ASA 3.5x16mm Promus to Ramux and ASA 2.19g81ba Promus to prox PDA     Hyperlipidemia      NSTEMI (non-ST elevated myocardial infarction) (H) 11/2017     Type 2 diabetes mellitus without complications (H)      Uncomplicated asthma        PAST SURGICAL HISTORY:  Past Surgical History:   Procedure Laterality Date     CORONARY ANGIOGRAPHY ADULT ORDER  11/29/2017 11/29/17- ASA 3.5x16mm Promus to Ramux and ASA 2.27z61pp Promus to prox PDA     GENITOURINARY SURGERY      prostate       FAMILY HISTORY:  Family History   Problem Relation Age of Onset     Alzheimer Disease Mother        SOCIAL HISTORY:  Social History     Social History     Marital status:      Spouse name: N/A     Number of children: N/A     Years of education: N/A     Social  "History Main Topics     Smoking status: Never Smoker     Smokeless tobacco: Never Used     Alcohol use No     Drug use: None     Sexual activity: Not Asked     Other Topics Concern     None     Social History Narrative       Review of Systems:  Skin:  Positive for bruising   Eyes:  Positive for glasses  ENT:  Positive for sinus trouble  Respiratory:  Positive for wheezing  Cardiovascular:    Positive for;dizziness;exercise intolerance  Gastroenterology: Negative    Genitourinary:  Positive for prostate problem  Musculoskeletal:  Positive for joint pain  Neurologic:  Negative    Psychiatric:  Negative    Heme/Lymph/Imm:  Positive for easy bruising  Endocrine:  Positive for diabetes    Physical Exam:  Vitals: /70  Pulse 69  Ht 1.753 m (5' 9\")  Wt 93 kg (205 lb)  BMI 30.27 kg/m2    Constitutional:  cooperative, alert and oriented, well developed, well nourished, in no acute distress        Skin:  warm and dry to the touch, no apparent skin lesions or masses noted        Head:  normocephalic, no masses or lesions        Eyes:  pupils equal and round;conjunctivae and lids unremarkable        ENT:  no pallor or cyanosis;dentition good   halitosis    Neck:  carotid pulses are full and equal bilaterally;JVP normal        Chest:  normal breath sounds, clear to auscultation, normal A-P diameter, normal symmetry, normal respiratory excursion, no use of accessory muscles        Cardiac: regular rhythm, normal S1/S2, no S3 or S4, apical impulse not displaced, no murmurs, gallops or rubs             distant heart tones    Abdomen:  abdomen soft, non-tender, BS normoactive, no mass, no HSM, no bruits   obese    Vascular:       3+                           right radial artery clean without hum or bruit    Extremities and Back:  no spinal abnormalities noted   venous stasis changes    Neurological:  no gross motor deficits          Recent Lab Results:  LIPID RESULTS:  Lab Results   Component Value Date    CHOL 161 " 11/29/2017    HDL 43 11/29/2017    LDL 95 11/29/2017    TRIG 116 11/29/2017       LIVER ENZYME RESULTS:  Lab Results   Component Value Date    AST 21 11/28/2017    ALT 31 11/28/2017       CBC RESULTS:  Lab Results   Component Value Date    WBC 15.2 (H) 11/30/2017    RBC 4.76 11/30/2017    HGB 14.3 11/30/2017    HCT 41.6 11/30/2017    MCV 87 11/30/2017    MCH 30.0 11/30/2017    MCHC 34.4 11/30/2017    RDW 13.4 11/30/2017     11/30/2017       BMP RESULTS:  Lab Results   Component Value Date     12/13/2017    POTASSIUM 4.3 12/13/2017    CHLORIDE 100 12/13/2017    CO2 27 12/13/2017    ANIONGAP 13.3 12/13/2017     (H) 12/13/2017    BUN 19 12/13/2017    CR 1.00 12/13/2017    GFRESTIMATED 72 12/13/2017    GFRESTBLACK 87 12/13/2017    MACIEJ 9.7 12/13/2017        A1C RESULTS:  Lab Results   Component Value Date    A1C 6.0 11/28/2017       INR RESULTS:  No results found for: INR        CC  THOMAS Fiore CNP  [address]  [place] [id number]

## 2017-12-13 NOTE — PATIENT INSTRUCTIONS
Continue same meds    See us back in February and have echocardiogram, fasting cholesterol, and a visit with Dr. Collins

## 2017-12-18 ENCOUNTER — TELEPHONE (OUTPATIENT)
Dept: CARDIOLOGY | Facility: CLINIC | Age: 82
End: 2017-12-18

## 2017-12-18 NOTE — TELEPHONE ENCOUNTER
Pt called and left message stating he may want to get Cortizone shot in knee. Returned call and asked Pt call to discuss, and that Pt can not hold Plavix for this. KATIA Bullard RN

## 2017-12-18 NOTE — TELEPHONE ENCOUNTER
Spoke with Pt regarding knee injection. Pt already had knee injected with sin viscus a filler. KATIA Bullard RN

## 2018-02-15 ENCOUNTER — HOSPITAL ENCOUNTER (OUTPATIENT)
Dept: CARDIOLOGY | Facility: CLINIC | Age: 83
Discharge: HOME | End: 2018-02-15
Attending: NURSE PRACTITIONER | Admitting: NURSE PRACTITIONER
Payer: MEDICARE

## 2018-02-15 DIAGNOSIS — I21.4 NSTEMI (NON-ST ELEVATED MYOCARDIAL INFARCTION) (H): ICD-10-CM

## 2018-02-15 LAB
ALT SERPL W P-5'-P-CCNC: <5 U/L (ref 5–30)
CHOLEST SERPL-MCNC: 107 MG/DL
HDLC SERPL-MCNC: 38 MG/DL
LDLC SERPL CALC-MCNC: 45 MG/DL
NONHDLC SERPL-MCNC: 69 MG/DL
TRIGL SERPL-MCNC: 119 MG/DL

## 2018-02-15 PROCEDURE — 36415 COLL VENOUS BLD VENIPUNCTURE: CPT | Performed by: NURSE PRACTITIONER

## 2018-02-15 PROCEDURE — 84460 ALANINE AMINO (ALT) (SGPT): CPT | Performed by: NURSE PRACTITIONER

## 2018-02-15 PROCEDURE — 93306 TTE W/DOPPLER COMPLETE: CPT

## 2018-02-15 PROCEDURE — 93306 TTE W/DOPPLER COMPLETE: CPT | Mod: 26 | Performed by: INTERNAL MEDICINE

## 2018-02-15 PROCEDURE — 25500064 ZZH RX 255 OP 636: Performed by: NURSE PRACTITIONER

## 2018-02-15 PROCEDURE — 80061 LIPID PANEL: CPT | Performed by: NURSE PRACTITIONER

## 2018-02-15 RX ADMIN — HUMAN ALBUMIN MICROSPHERES AND PERFLUTREN 9 ML: 10; .22 INJECTION, SOLUTION INTRAVENOUS at 10:30

## 2018-02-22 ENCOUNTER — OFFICE VISIT (OUTPATIENT)
Dept: CARDIOLOGY | Facility: CLINIC | Age: 83
End: 2018-02-22
Attending: NURSE PRACTITIONER
Payer: COMMERCIAL

## 2018-02-22 VITALS
BODY MASS INDEX: 30.51 KG/M2 | SYSTOLIC BLOOD PRESSURE: 138 MMHG | HEIGHT: 69 IN | DIASTOLIC BLOOD PRESSURE: 82 MMHG | WEIGHT: 206 LBS

## 2018-02-22 DIAGNOSIS — E78.2 MIXED HYPERLIPIDEMIA: ICD-10-CM

## 2018-02-22 DIAGNOSIS — I21.4 NSTEMI (NON-ST ELEVATED MYOCARDIAL INFARCTION) (H): ICD-10-CM

## 2018-02-22 DIAGNOSIS — I10 BENIGN ESSENTIAL HTN: Primary | ICD-10-CM

## 2018-02-22 PROCEDURE — 99214 OFFICE O/P EST MOD 30 MIN: CPT | Performed by: INTERNAL MEDICINE

## 2018-02-22 NOTE — PROGRESS NOTES
HPI and Plan:   See dictation    Orders Placed This Encounter   Procedures     Follow-Up with Cardiac Advanced Practice Provider     No orders of the defined types were placed in this encounter.    There are no discontinued medications.      Encounter Diagnoses   Name Primary?     NSTEMI (non-ST elevated myocardial infarction) (H)      Benign essential HTN Yes     Mixed hyperlipidemia        CURRENT MEDICATIONS:  Current Outpatient Prescriptions   Medication Sig Dispense Refill     clopidogrel (PLAVIX) 75 MG tablet Take 1 tablet (75 mg) by mouth daily 90 tablet 3     atorvastatin (LIPITOR) 20 MG tablet Take 1 tablet (20 mg) by mouth daily 90 tablet 3     carvedilol (COREG) 6.25 MG tablet Take 1 tablet (6.25 mg) by mouth 2 times daily (with meals) 180 tablet 3     aspirin EC 81 MG EC tablet Take 1 tablet (81 mg) by mouth daily       nitroGLYcerin (NITROSTAT) 0.4 MG sublingual tablet For chest pain place 1 tablet under the tongue every 5 minutes for 3 doses. If symptoms persist 5 minutes after 1st dose call 911. 25 tablet 3     metFORMIN (GLUCOPHAGE) 1000 MG tablet Take 1 tablet (1,000 mg) by mouth 2 times daily (with meals) 60 tablet 0     amLODIPine (NORVASC) 10 MG tablet Take 1 tablet (10 mg) by mouth daily 30 tablet 1     CLONIDINE HCL PO Take 0.1 mg by mouth 2 times daily        Multiple Vitamins-Minerals (CENTRUM SILVER) per tablet Take 1 tablet by mouth daily       losartan (COZAAR) 50 MG tablet Take 50 mg by mouth daily         ALLERGIES     Allergies   Allergen Reactions     Ciprofloxacin      Contrast Dye      Facial and lip swelling     Iodine        PAST MEDICAL HISTORY:  Past Medical History:   Diagnosis Date     Ascending aorta dilatation (H)      Benign essential HTN      CAD (coronary artery disease)     11/29/17- ASA 3.5x16mm Promus to Ramux and ASA 2.66c48od Promus to prox PDA     Hyperlipidemia      NSTEMI (non-ST elevated myocardial infarction) (H) 11/2017     90% lesion in the ramus stented with a  "3.5 x 16 mm Promus Premier drug-eluting stent as well as a 90% PDA lesion stented with a 2.25 x 28 mm Promus Premier drug-eluting stent.LMA30%, 70% ostial Dl small vessel  treat medically. [id number] 100% The LAD was 30% diseased.  LV and diastolic pressure was 20     Type 2 diabetes mellitus without complications (H)      Uncomplicated asthma        PAST SURGICAL HISTORY:  Past Surgical History:   Procedure Laterality Date     CORONARY ANGIOGRAPHY ADULT ORDER  11/29/2017 11/29/17- ASA to Ramus, ASA to PDA, Lma 30%, Lad-mild 30, [id number] 70% ostial, D2 small 100%, Lcx mild     GENITOURINARY SURGERY      prostate       FAMILY HISTORY:  Family History   Problem Relation Age of Onset     Alzheimer Disease Mother        SOCIAL HISTORY:  Social History     Social History     Marital status:      Spouse name: N/A     Number of children: N/A     Years of education: N/A     Social History Main Topics     Smoking status: Never Smoker     Smokeless tobacco: Never Used     Alcohol use No     Drug use: None     Sexual activity: Not Asked     Other Topics Concern     None     Social History Narrative       Review of Systems:  Skin:  Positive for bruising   Eyes:  Positive for glasses  ENT:  Positive for sinus trouble  Respiratory:  Positive for wheezing;dyspnea on exertion  Cardiovascular:    Positive for;dizziness;exercise intolerance;lightheadedness  Gastroenterology: Negative    Genitourinary:  Positive for prostate problem  Musculoskeletal:  Positive for joint pain  Neurologic:  Negative    Psychiatric:  Negative    Heme/Lymph/Imm:  Positive for easy bruising  Endocrine:  Positive for diabetes    Physical Exam:  Vitals: /82  Ht 1.753 m (5' 9.02\")  Wt 93.4 kg (206 lb)  BMI 30.41 kg/m2    Constitutional:  cooperative, alert and oriented, well developed, well nourished, in no acute distress        Skin:  warm and dry to the touch, no apparent skin lesions or masses noted          Head:  normocephalic, no masses " or lesions        Eyes:  pupils equal and round, conjunctivae and lids unremarkable, sclera white, no xanthalasma, EOMS intact, no nystagmus        Lymph:No Cervical lymphadenopathy present     ENT:  no pallor or cyanosis, dentition good        Neck:  carotid pulses are full and equal bilaterally, JVP normal, no carotid bruit        Respiratory:       wheeze bilat    Cardiac: regular rhythm, normal S1/S2, no S3 or S4, apical impulse not displaced, no murmurs, gallops or rubs   distant heart sounds                                                     GI:  abdomen soft, non-tender, BS normoactive, no mass, no HSM, no bruits obese      Extremities and Muscular Skeletal:      bilateral LE edema;trace     venous stasis changes    Neurological:  no gross motor deficits        Psych:  Alert and Oriented x 3      Recent Lab Results:  LIPID RESULTS:  Lab Results   Component Value Date    CHOL 107 02/15/2018    HDL 38 (L) 02/15/2018    LDL 45 02/15/2018    TRIG 119 02/15/2018       LIVER ENZYME RESULTS:  Lab Results   Component Value Date    AST 21 11/28/2017    ALT <5 (L) 02/15/2018       CBC RESULTS:  Lab Results   Component Value Date    WBC 15.2 (H) 11/30/2017    RBC 4.76 11/30/2017    HGB 14.3 11/30/2017    HCT 41.6 11/30/2017    MCV 87 11/30/2017    MCH 30.0 11/30/2017    MCHC 34.4 11/30/2017    RDW 13.4 11/30/2017     11/30/2017       BMP RESULTS:  Lab Results   Component Value Date     12/13/2017    POTASSIUM 4.3 12/13/2017    CHLORIDE 100 12/13/2017    CO2 27 12/13/2017    ANIONGAP 13.3 12/13/2017     (H) 12/13/2017    BUN 19 12/13/2017    CR 1.00 12/13/2017    GFRESTIMATED 72 12/13/2017    GFRESTBLACK 87 12/13/2017    MACIEJ 9.7 12/13/2017        A1C RESULTS:  Lab Results   Component Value Date    A1C 6.0 11/28/2017       INR RESULTS:  No results found for: INR        CC  Kashif Nassar MD  [facility] [address] [place] [address]

## 2018-02-22 NOTE — LETTER
2/22/2018      Mpho Walt Guillory MD  [facility] [address]  [facility] [address]      RE: Cayden Soto       Dear Colleague,    I had the pleasure of seeing Cayden Soto in the [facility].    Service Date: 02/22/2018      HISTORY OF PRESENT ILLNESS:  I had the pleasure of following up on our mutual patient, Cayden Soto.  He is a delightful 82-year-old gentleman.  I met him in November.  He has a history of asthma.  He does have diabetes.  He had a very mild hyperlipidemia with slightly high LDL, given that he has diabetes, and slightly low HDL.  He was complaining about chest pain and ruled in for myocardial infarction.  Today, I discussed with him the results of his blood tests and his echocardiogram.  After I talked to him about his cholesterol, he still asked me about cholesterol questions and also when I showed him his echocardiogram showing his heart function, he still asked about heart function issues.  Therefore, I spent more time to make sure that he was fully understanding everything.  We reviewed his actual angiogram pictures.  The left main artery had a proximal disease of about 30%, the LAD had scattered 30% narrowing.  D1 was a small vessel with 70% narrowing, we elected for medical treatment.  D2 was a tiny vessel totally occluded.  He had a ramus branch that had at least a 70% narrowing that was stented with good results.  The left circumflex had mild disease.  Right coronary had mild disease except he PDA had unfortunately very severe diffuse disease of more than 90%, that one was able to be stented with good result.  He reports no further angina.  His echocardiogram today shows ejection fraction of 54%.  There is mild AI and no other significant valve disease.  His cholesterol numbers are significantly improved on Lipitor, currently ALT is 5, HDL 38, LDL 45, triglyceride 119, total cholesterol 107.  That represents a marked  improvement.  He does have multiple orthopedic issues including joint problems, and in fact I am a little surprised he got a joint injection with Synvisc.  I do not know if the orthopedic doctor knew that he was on Plavix.  Fortunately, he did not have any hemarthrosis.  I asked him to make sure that the future that all of the doctors know that he is on blood thinners.  He will be on the Plavix for the first year.  He also was discussing some muscle aches elsewhere.  It is hard to know how much of that was joint versus muscle.  I told him to stop the Lipitor for 2-3 weeks, see if all the muscle aches go away and then restart the Lipitor and see if all of the muscle aches come back.  If they do, he will call our office, and we will probably switch him to a trial of Crestor 5 mg a day plus CoQ10.  I do note on exam he has mild ankle edema.  He is on amlodipine 10 mg a day, but he states that this is stable and his blood pressures look perfect, so I am not going to make that change.  He is still wheezing, and that was his original problem.  I do not know if he needs to have his inhalers intensified, and I asked him to discuss that with you.  Of note, he is on Coreg, but again the wheezing started even before he was on any of his heart medicines.  We will see him back in 6 months for a regular visit.  If he is doing well at that time, we will plan on stopping the Plavix at the 1-year anniversary.  He will probably get a stress test in 2 years to follow up on the other blockages, unless there is a return of his chest pain before that time.  If the ankle edema becomes a problem, one possibility might be to increase the losartan from 50 up to 100 and drop the amlodipine from 10 down to 5.  I will leave that as a thought for you to review.      Total consult time today was 40 minutes, greater than 50% counseling.      cc:   Jericho Guillory MD    [facility]    [address], [address]     [place]  [address]         MAGDY COLLINS MD             D: 2018   T: 2018   MT: AGUSTINA      Name:     ABIGAIL FAGAN   MRN:      [id number]        Account:      [id number]   :      1935           Service Date: 2018      Document: [id number]         Outpatient Encounter Prescriptions as of 2018   Medication Sig Dispense Refill     clopidogrel (PLAVIX) 75 MG tablet Take 1 tablet (75 mg) by mouth daily 90 tablet 3     atorvastatin (LIPITOR) 20 MG tablet Take 1 tablet (20 mg) by mouth daily 90 tablet 3     carvedilol (COREG) 6.25 MG tablet Take 1 tablet (6.25 mg) by mouth 2 times daily (with meals) 180 tablet 3     aspirin EC 81 MG EC tablet Take 1 tablet (81 mg) by mouth daily       nitroGLYcerin (NITROSTAT) 0.4 MG sublingual tablet For chest pain place 1 tablet under the tongue every 5 minutes for 3 doses. If symptoms persist 5 minutes after 1st dose call 911. 25 tablet 3     metFORMIN (GLUCOPHAGE) 1000 MG tablet Take 1 tablet (1,000 mg) by mouth 2 times daily (with meals) 60 tablet 0     amLODIPine (NORVASC) 10 MG tablet Take 1 tablet (10 mg) by mouth daily 30 tablet 1     CLONIDINE HCL PO Take 0.1 mg by mouth 2 times daily        Multiple Vitamins-Minerals (CENTRUM SILVER) per tablet Take 1 tablet by mouth daily       losartan (COZAAR) 50 MG tablet Take 50 mg by mouth daily       No facility-administered encounter medications on file as of 2018.        Again, thank you for allowing me to participate in the care of your patient.      Sincerely,    Magdy Collins MD     [facility]    cc:   Kashif Nassar MD  [facility] [address] [place] [address]

## 2018-02-22 NOTE — PROGRESS NOTES
Service Date: 02/22/2018      HISTORY OF PRESENT ILLNESS:  I had the pleasure of following up on our mutual patient, Cayden Soto.  He is a delightful 82-year-old gentleman.  I met him in November.  He has a history of asthma.  He does have diabetes.  He had a very mild hyperlipidemia with slightly high LDL, given that he has diabetes, and slightly low HDL.  He was complaining about chest pain and ruled in for myocardial infarction.  Today, I discussed with him the results of his blood tests and his echocardiogram.  After I talked to him about his cholesterol, he still asked me about cholesterol questions and also when I showed him his echocardiogram showing his heart function, he still asked about heart function issues.  Therefore, I spent more time to make sure that he was fully understanding everything.  We reviewed his actual angiogram pictures.  The left main artery had a proximal disease of about 30%, the LAD had scattered 30% narrowing.  D1 was a small vessel with 70% narrowing, we elected for medical treatment.  D2 was a tiny vessel totally occluded.  He had a ramus branch that had at least a 70% narrowing that was stented with good results.  The left circumflex had mild disease.  Right coronary had mild disease except he PDA had unfortunately very severe diffuse disease of more than 90%, that one was able to be stented with good result.  He reports no further angina.  His echocardiogram today shows ejection fraction of 54%.  There is mild AI and no other significant valve disease.  His cholesterol numbers are significantly improved on Lipitor, currently ALT is 5, HDL 38, LDL 45, triglyceride 119, total cholesterol 107.  That represents a marked improvement.  He does have multiple orthopedic issues including joint problems, and in fact I am a little surprised he got a joint injection with Synvisc.  I do not know if the orthopedic doctor knew that he was on Plavix.  Fortunately, he did not have any  hemarthrosis.  I asked him to make sure that the future that all of the doctors know that he is on blood thinners.  He will be on the Plavix for the first year.  He also was discussing some muscle aches elsewhere.  It is hard to know how much of that was joint versus muscle.  I told him to stop the Lipitor for 2-3 weeks, see if all the muscle aches go away and then restart the Lipitor and see if all of the muscle aches come back.  If they do, he will call our office, and we will probably switch him to a trial of Crestor 5 mg a day plus CoQ10.  I do note on exam he has mild ankle edema.  He is on amlodipine 10 mg a day, but he states that this is stable and his blood pressures look perfect, so I am not going to make that change.  He is still wheezing, and that was his original problem.  I do not know if he needs to have his inhalers intensified, and I asked him to discuss that with you.  Of note, he is on Coreg, but again the wheezing started even before he was on any of his heart medicines.  We will see him back in 6 months for a regular visit.  If he is doing well at that time, we will plan on stopping the Plavix at the 1-year anniversary.  He will probably get a stress test in 2 years to follow up on the other blockages, unless there is a return of his chest pain before that time.  If the ankle edema becomes a problem, one possibility might be to increase the losartan from 50 up to 100 and drop the amlodipine from 10 down to 5.  I will leave that as a thought for you to review.      Total consult time today was 40 minutes, greater than 50% counseling.      cc:   Jericho Guillory MD    [facility]    [address], [address]    [place]  [address]         LU WELDON MD             D: 2018   T: 2018   MT: AGUSTINA      Name:     ABIGAIL FAGAN   MRN:      [id number]        Account:      [id number]   :      1935           Service Date: 2018      Document:  [id number]

## 2018-02-22 NOTE — LETTER
2/22/2018    Mpho Walt Guillory MD  [facility] [address]  [facility] [address]    RE: Cayden Dewitt       Dear Colleague,    I had the pleasure of seeing Cayden Dewitt in the [facility].    HPI and Plan:   See dictation    Orders Placed This Encounter   Procedures     Follow-Up with Cardiac Advanced Practice Provider     No orders of the defined types were placed in this encounter.    There are no discontinued medications.      Encounter Diagnoses   Name Primary?     NSTEMI (non-ST elevated myocardial infarction) (H)      Benign essential HTN Yes     Mixed hyperlipidemia        CURRENT MEDICATIONS:  Current Outpatient Prescriptions   Medication Sig Dispense Refill     clopidogrel (PLAVIX) 75 MG tablet Take 1 tablet (75 mg) by mouth daily 90 tablet 3     atorvastatin (LIPITOR) 20 MG tablet Take 1 tablet (20 mg) by mouth daily 90 tablet 3     carvedilol (COREG) 6.25 MG tablet Take 1 tablet (6.25 mg) by mouth 2 times daily (with meals) 180 tablet 3     aspirin EC 81 MG EC tablet Take 1 tablet (81 mg) by mouth daily       nitroGLYcerin (NITROSTAT) 0.4 MG sublingual tablet For chest pain place 1 tablet under the tongue every 5 minutes for 3 doses. If symptoms persist 5 minutes after 1st dose call 911. 25 tablet 3     metFORMIN (GLUCOPHAGE) 1000 MG tablet Take 1 tablet (1,000 mg) by mouth 2 times daily (with meals) 60 tablet 0     amLODIPine (NORVASC) 10 MG tablet Take 1 tablet (10 mg) by mouth daily 30 tablet 1     CLONIDINE HCL PO Take 0.1 mg by mouth 2 times daily        Multiple Vitamins-Minerals (CENTRUM SILVER) per tablet Take 1 tablet by mouth daily       losartan (COZAAR) 50 MG tablet Take 50 mg by mouth daily         ALLERGIES     Allergies   Allergen Reactions     Ciprofloxacin      Contrast Dye      Facial and lip swelling     Iodine        PAST MEDICAL HISTORY:  Past Medical History:   Diagnosis Date     Ascending aorta dilatation (H)      Benign  "essential HTN      CAD (coronary artery disease)     11/29/17- ASA 3.5x16mm Promus to Ramux and ASA 2.89o12sr Promus to prox PDA     Hyperlipidemia      NSTEMI (non-ST elevated myocardial infarction) (H) 11/2017     90% lesion in the ramus stented with a 3.5 x 16 mm Promus Premier drug-eluting stent as well as a 90% PDA lesion stented with a 2.25 x 28 mm Promus Premier drug-eluting stent.LMA30%, 70% ostial Dl small vessel  treat medically. J2krvak 100% The LAD was 30% diseased.  LV and diastolic pressure was 20     Type 2 diabetes mellitus without complications (H)      Uncomplicated asthma        PAST SURGICAL HISTORY:  Past Surgical History:   Procedure Laterality Date     CORONARY ANGIOGRAPHY ADULT ORDER  11/29/2017 11/29/17- ASA to Ramus, ASA to PDA, Lma 30%, Lad-mild 30, S1cpsqf 70% ostial, D2 small 100%, Lcx mild     GENITOURINARY SURGERY      prostate       FAMILY HISTORY:  Family History   Problem Relation Age of Onset     Alzheimer Disease Mother        SOCIAL HISTORY:  Social History     Social History     Marital status:      Spouse name: N/A     Number of children: N/A     Years of education: N/A     Social History Main Topics     Smoking status: Never Smoker     Smokeless tobacco: Never Used     Alcohol use No     Drug use: None     Sexual activity: Not Asked     Other Topics Concern     None     Social History Narrative       Review of Systems:  Skin:  Positive for bruising   Eyes:  Positive for glasses  ENT:  Positive for sinus trouble  Respiratory:  Positive for wheezing;dyspnea on exertion  Cardiovascular:    Positive for;dizziness;exercise intolerance;lightheadedness  Gastroenterology: Negative    Genitourinary:  Positive for prostate problem  Musculoskeletal:  Positive for joint pain  Neurologic:  Negative    Psychiatric:  Negative    Heme/Lymph/Imm:  Positive for easy bruising  Endocrine:  Positive for diabetes    Physical Exam:  Vitals: /82  Ht 1.753 m (5' 9.02\")  Wt 93.4 kg " (206 lb)  BMI 30.41 kg/m2    Constitutional:  cooperative, alert and oriented, well developed, well nourished, in no acute distress        Skin:  warm and dry to the touch, no apparent skin lesions or masses noted          Head:  normocephalic, no masses or lesions        Eyes:  pupils equal and round, conjunctivae and lids unremarkable, sclera white, no xanthalasma, EOMS intact, no nystagmus        Lymph:No Cervical lymphadenopathy present     ENT:  no pallor or cyanosis, dentition good        Neck:  carotid pulses are full and equal bilaterally, JVP normal, no carotid bruit        Respiratory:       wheeze bilat    Cardiac: regular rhythm, normal S1/S2, no S3 or S4, apical impulse not displaced, no murmurs, gallops or rubs   distant heart sounds                                                     GI:  abdomen soft, non-tender, BS normoactive, no mass, no HSM, no bruits obese      Extremities and Muscular Skeletal:      bilateral LE edema;trace     venous stasis changes    Neurological:  no gross motor deficits        Psych:  Alert and Oriented x 3      Recent Lab Results:  LIPID RESULTS:  Lab Results   Component Value Date    CHOL 107 02/15/2018    HDL 38 (L) 02/15/2018    LDL 45 02/15/2018    TRIG 119 02/15/2018       LIVER ENZYME RESULTS:  Lab Results   Component Value Date    AST 21 11/28/2017    ALT <5 (L) 02/15/2018       CBC RESULTS:  Lab Results   Component Value Date    WBC 15.2 (H) 11/30/2017    RBC 4.76 11/30/2017    HGB 14.3 11/30/2017    HCT 41.6 11/30/2017    MCV 87 11/30/2017    MCH 30.0 11/30/2017    MCHC 34.4 11/30/2017    RDW 13.4 11/30/2017     11/30/2017       BMP RESULTS:  Lab Results   Component Value Date     12/13/2017    POTASSIUM 4.3 12/13/2017    CHLORIDE 100 12/13/2017    CO2 27 12/13/2017    ANIONGAP 13.3 12/13/2017     (H) 12/13/2017    BUN 19 12/13/2017    CR 1.00 12/13/2017    GFRESTIMATED 72 12/13/2017    GFRESTBLACK 87 12/13/2017    MACIEJ 9.7 12/13/2017         A1C RESULTS:  Lab Results   Component Value Date    A1C 6.0 11/28/2017       INR RESULTS:  No results found for: INR        CC  Kashif Nassar MD  [facility] [address] [place] [address]    Thank you for allowing me to participate in the care of your patient.      Sincerely,     Magdy Collins MD     [facility]    cc:   Kashif Nassar MD  [facility] [address] [place] [address]

## 2018-02-22 NOTE — MR AVS SNAPSHOT
"              After Visit Summary   2/22/2018    Cayden Dewitt    MRN: [id number]           Patient Information     Date Of Birth          1935        Visit Information        Provider Department      2/22/2018 8:45 AM Magdy Collins MD [facility]        Today's Diagnoses     Benign essential HTN    -  1    NSTEMI (non-ST elevated myocardial infarction) (H)        Mixed hyperlipidemia           Follow-ups after your visit        Additional Services     Follow-Up with Cardiac Advanced Practice Provider                 Future tests that were ordered for you today     Open Future Orders        Priority Expected Expires Ordered    Follow-Up with Cardiac Advanced Practice Provider Routine 8/21/2018 2/22/2019 2/22/2018            Who to contact     If you have questions or need follow up information about today's clinic visit or your schedule please contact [facility] directly at [phone].  Normal or non-critical lab and imaging results will be communicated to you by MyChart, letter or phone within 4 business days after the clinic has received the results. If you do not hear from us within 7 days, please contact the clinic through [employer] or phone. If you have a critical or abnormal lab result, we will notify you by phone as soon as possible.  Submit refill requests through [employer] or call your pharmacy and they will forward the refill request to us. Please allow 3 business days for your refill to be completed.          Additional Information About Your Visit        MyChart Information     [employer] lets you send messages to your doctor, view your test results, renew your prescriptions, schedule appointments and more. To sign up, go to [email or web address] . Click on \"Log in\" on the left side of the screen, which will take you to the Welcome page. Then click on \"Sign up Now\" on the right side of the page.     You will be asked " "to enter the access code listed below, as well as some personal information. Please follow the directions to create your username and password.     Your access code is: [email or web address]  Expires: 2018  9:33 AM     Your access code will  in 90 days. If you need help or a new code, please call your [facility] clinic or [phone].        Care EveryWhere ID     This is your Care EveryWhere ID. This could be used by other organizations to access your [facility] medical records  [id number]        Your Vitals Were     Height BMI (Body Mass Index)                1.753 m (5' 9.02\") 30.41 kg/m2           Blood Pressure from Last 3 Encounters:   18 138/82   17 118/70   17 123/79    Weight from Last 3 Encounters:   18 93.4 kg (206 lb)   17 93 kg (205 lb)   17 95.9 kg (211 lb 6.7 oz)              We Performed the Following     Follow-Up with Cardiologist        Primary Care Provider Office Phone # Fax #    Mpho Walt Guillory [phone]       [facility] [address]  [facility] [address]        Equal Access to Services     MÓNICA GREENE : Hadii aad ku hadasho Soomaali, waaxda luqadaha, qaybta kaalmada adeegyada, waxay idiin hayvernan nguyen raymond. So [facility] [phone].    ATENCIÓN: Si habla español, tiene a jasmine disposición servicios gratuitos de asistencia lingüística. Llame al [phone].    We comply with applicable federal civil rights laws and [place] laws. We do not discriminate on the basis of race, color, national origin, age, disability, sex, sexual orientation, or gender identity.            Thank you!     Thank you for choosing [facility]  for your care. Our goal is always to provide you with excellent care. Hearing back from our patients is one way we can continue to improve our services. Please take a few minutes to complete the written survey that you may receive in the mail after your " visit with us. Thank you!             Your Updated Medication List - Protect others around you: Learn how to safely use, store and throw away your medicines at www.disposemymeds.org.          This list is accurate as of 2/22/18 10:00 AM.  Always use your most recent med list.                   Brand Name Dispense Instructions for use Diagnosis    amLODIPine 10 MG tablet    NORVASC    30 tablet    Take 1 tablet (10 mg) by mouth daily    Essential hypertension       aspirin 81 MG EC tablet      Take 1 tablet (81 mg) by mouth daily    NSTEMI (non-ST elevated myocardial infarction) (H)       atorvastatin 20 MG tablet    LIPITOR    90 tablet    Take 1 tablet (20 mg) by mouth daily    NSTEMI (non-ST elevated myocardial infarction) (H)       carvedilol 6.25 MG tablet    COREG    180 tablet    Take 1 tablet (6.25 mg) by mouth 2 times daily (with meals)    NSTEMI (non-ST elevated myocardial infarction) (H)       CENTRUM SILVER per tablet      Take 1 tablet by mouth daily        CLONIDINE HCL PO      Take 0.1 mg by mouth 2 times daily        clopidogrel 75 MG tablet    PLAVIX    90 tablet    Take 1 tablet (75 mg) by mouth daily    NSTEMI (non-ST elevated myocardial infarction) (H)       losartan 50 MG tablet    COZAAR     Take 50 mg by mouth daily        metFORMIN 1000 MG tablet    GLUCOPHAGE    60 tablet    Take 1 tablet (1,000 mg) by mouth 2 times daily (with meals)    Type 2 diabetes mellitus with hyperglycemia, without long-term current use of insulin (H)       nitroGLYcerin 0.4 MG sublingual tablet    NITROSTAT    25 tablet    For chest pain place 1 tablet under the tongue every 5 minutes for 3 doses. If symptoms persist 5 minutes after 1st dose call 911.    NSTEMI (non-ST elevated myocardial infarction) (H)

## 2018-08-28 ENCOUNTER — DOCUMENTATION ONLY (OUTPATIENT)
Dept: CARDIOLOGY | Facility: CLINIC | Age: 83
End: 2018-08-28

## 2018-08-28 ENCOUNTER — OFFICE VISIT (OUTPATIENT)
Dept: CARDIOLOGY | Facility: CLINIC | Age: 83
End: 2018-08-28
Attending: INTERNAL MEDICINE
Payer: COMMERCIAL

## 2018-08-28 VITALS
DIASTOLIC BLOOD PRESSURE: 80 MMHG | HEART RATE: 76 BPM | BODY MASS INDEX: 30.46 KG/M2 | SYSTOLIC BLOOD PRESSURE: 140 MMHG | WEIGHT: 201 LBS | HEIGHT: 68 IN

## 2018-08-28 DIAGNOSIS — Z53.9 ERRONEOUS ENCOUNTER--DISREGARD: Primary | ICD-10-CM

## 2018-08-28 DIAGNOSIS — I10 BENIGN ESSENTIAL HTN: ICD-10-CM

## 2018-08-28 DIAGNOSIS — I21.4 NSTEMI (NON-ST ELEVATED MYOCARDIAL INFARCTION) (H): ICD-10-CM

## 2018-08-28 DIAGNOSIS — E78.2 MIXED HYPERLIPIDEMIA: ICD-10-CM

## 2018-08-28 PROCEDURE — 99215 OFFICE O/P EST HI 40 MIN: CPT | Performed by: NURSE PRACTITIONER

## 2018-08-28 RX ORDER — AMLODIPINE BESYLATE 5 MG/1
5 TABLET ORAL DAILY
Qty: 90 TABLET | Refills: 3 | Status: SHIPPED | OUTPATIENT
Start: 2018-08-28

## 2018-08-28 RX ORDER — ROSUVASTATIN CALCIUM 5 MG/1
TABLET, COATED ORAL
Qty: 90 TABLET | Refills: 3 | Status: SHIPPED | OUTPATIENT
Start: 2018-08-28 | End: 2018-11-12

## 2018-08-28 NOTE — PROGRESS NOTES
History of Present Illness:    Cayden Dewitt is a 82 year old male followed here by Dr. Collins.  We met him last November when he presented with a band of pain across his anterior chest.  He had a mildly elevated troponin.  He was premedicated for dye allergy with prednisone and Benadryl.  He went on for coronary angiogram was found to have a 90% lesion in the ramus stented with a 3.5 x 16 mm Promus Premier drug-eluting stent and a 90% PDA lesion stented with a 2.25 x 28 mm Promus Premier drug-eluting stent.  He has a 70% ostial diagonal lesion which is small and we are treating medically.  The second diagonal was closed.  The LAD had a 30% stenosis.    Echocardiogram revealed ejection fraction of 40-45% but has rebounded to normal.    He has a history of chronic bronchitis-type symptoms with a cough and wheezing.  We switched him to Bystolic but his formulary plan switching back to Coreg.  He is on an inhaler and we have referred him back to primary care to look into these issues if needed.    He has a dilated ascending aorta measuring 44 mm.  His blood pressures up and down and he is a bit of a highly anxious man.  Today he is over 140.  At times he states it is 130s but is checking his blood pressure at home with a wrist cuff which may not be accurate    At the last office visit, he felt he was having leg pain from Lipitor.  Dr. Collins instructed him in February to stop taking Lipitor and the pain got better. He restarted Atorvastatin and the pain came back.  At that point Dr. Collins wanted him to call but he did not and he has been off of his statin now for 6 months.  Baseline LDL when we met him was in the 90s.    He has mild ankle edema which may be his amlodipine at 10 mg per day.  This is worse at night, abates in the morning.    He tells me he recently saw Urologist through the [facility].  This is Dr. Gricel Troncoso.  He was hoping to do a procedure to remove stones in the left ureter  and place a stent.  I told this patient unless he can have this done on Aspirin and Plavix, we would suggest postponing this until November.  This caused a whole constellation of questions from the patient which I did my best to answer.    I subsequently called Dr. Gricel Troncoso's office and his nurses are checking with him to see if the procedure can be done on dual antiplatelet therapy.  If not they will need to discuss the merits of postponing it.    On exam today he has an occasional upper airway wheeze.  His edema is 1+ bilaterally.  Blood pressure today by me is around 140/76 palpated.    Impression/Plan:     1.  Coronary artery disease with a non-STEMI November 28, 2017 treated with Promus Premier drug-eluting stents into the ramus and proximal posterior descending artery  -He has ongoing first diagonal stenosis of 60% and an occluded second diagonal with left to left collateral flow and 30% LAD which we will continue medical therapy for  -Continue dual antiplatelet therapy until November 28, 2018    2.  Normalization of LV function    3.  Hypertension uncontrolled particularly given his dilated aorta; ;ongoing edema  -Reduce amlodipine to 5 mg a day to see if his edema improves  -Increase losartan to 100 mg a day  -See me back September 14 with a BMP  -We have the option of increasing clonidine in the future if need be    4.  Dyslipidemia, now off statin therapy for 6 months  -I will trial Crestor 5 mg every other day.  When I see him back in September we will increase this if he is tolerating it and consider Co-Q-10.    5.  Ureteral stones in need of stents and lithotripsy  -Have contacted Dr. Lawrence Troncoso's office through [employer] and left a message with the nurses.  Unless they can do this on dual antiplatelet therapy we would suggest waiting, if possible, until the end of November when his Plavix can safely be discontinued.      It has been a pleasure seeing Cayden Dewitt in follow up  today.  He was somewhat of an anxious gentleman today and had multiple, multiple questions.  Greater than 50% of this one-hour visit today was spent in counseling    Laure Hughes, MSN, APRN-BC, CNP  Cardiology    No orders of the defined types were placed in this encounter.    Orders Placed This Encounter   Medications     rosuvastatin (CRESTOR) 5 MG tablet     Sig: One pill every other day and as directed     Dispense:  90 tablet     Refill:  3     amLODIPine (NORVASC) 5 MG tablet     Sig: Take 1 tablet (5 mg) by mouth daily     Dispense:  90 tablet     Refill:  3     Medications Discontinued During This Encounter   Medication Reason     atorvastatin (LIPITOR) 20 MG tablet      losartan (COZAAR) 50 MG tablet      amLODIPine (NORVASC) 10 MG tablet          Encounter Diagnoses   Name Primary?     NSTEMI (non-ST elevated myocardial infarction) (H)      Benign essential HTN      Mixed hyperlipidemia        CURRENT MEDICATIONS:  Current Outpatient Prescriptions   Medication Sig Dispense Refill     amLODIPine (NORVASC) 5 MG tablet Take 1 tablet (5 mg) by mouth daily 90 tablet 3     aspirin EC 81 MG EC tablet Take 1 tablet (81 mg) by mouth daily       carvedilol (COREG) 6.25 MG tablet Take 1 tablet (6.25 mg) by mouth 2 times daily (with meals) 180 tablet 3     CLONIDINE HCL PO Take 0.1 mg by mouth 2 times daily        clopidogrel (PLAVIX) 75 MG tablet Take 1 tablet (75 mg) by mouth daily 90 tablet 3     metFORMIN (GLUCOPHAGE) 1000 MG tablet Take 1 tablet (1,000 mg) by mouth 2 times daily (with meals) 60 tablet 0     Multiple Vitamins-Minerals (CENTRUM SILVER) per tablet Take 1 tablet by mouth daily       nitroGLYcerin (NITROSTAT) 0.4 MG sublingual tablet For chest pain place 1 tablet under the tongue every 5 minutes for 3 doses. If symptoms persist 5 minutes after 1st dose call 911. 25 tablet 3     rosuvastatin (CRESTOR) 5 MG tablet One pill every other day and as directed 90 tablet 3     [DISCONTINUED] amLODIPine  (NORVASC) 10 MG tablet Take 1 tablet (10 mg) by mouth daily 30 tablet 1       ALLERGIES     Allergies   Allergen Reactions     Ciprofloxacin      Contrast Dye      Facial and lip swelling     Iodine        PAST MEDICAL HISTORY:  Past Medical History:   Diagnosis Date     Ascending aorta dilatation (H)      Benign essential HTN      CAD (coronary artery disease)     11/29/17- ASA 3.5x16mm Promus to Ramux and ASA 2.52w60js Promus to prox PDA     Hyperlipidemia      NSTEMI (non-ST elevated myocardial infarction) (H) 11/2017     90% lesion in the ramus stented with a 3.5 x 16 mm Promus Premier drug-eluting stent as well as a 90% PDA lesion stented with a 2.25 x 28 mm Promus Premier drug-eluting stent.LMA30%, 70% ostial Dl small vessel  treat medically. [id number] 100% The LAD was 30% diseased.  LV and diastolic pressure was 20     Type 2 diabetes mellitus without complications (H)      Uncomplicated asthma        PAST SURGICAL HISTORY:  Past Surgical History:   Procedure Laterality Date     CORONARY ANGIOGRAPHY ADULT ORDER  11/29/2017 11/29/17- ASA to Ramus, ASA to PDA, Lma 30%, Lad-mild 30, [id number] 70% ostial, D2 small 100%, Lcx mild     GENITOURINARY SURGERY      prostate       FAMILY HISTORY:  Family History   Problem Relation Age of Onset     Alzheimer Disease Mother        SOCIAL HISTORY:  Social History     Social History     Marital status:      Spouse name: N/A     Number of children: N/A     Years of education: N/A     Social History Main Topics     Smoking status: Never Smoker     Smokeless tobacco: Never Used     Alcohol use No     Drug use: None     Sexual activity: Not Asked     Other Topics Concern     None     Social History Narrative       Review of Systems:  Skin:  Positive for bruising     Eyes:  Positive for glasses    ENT:  Positive for nasal congestion    Respiratory:  Positive for wheezing;cough (due to poor air quality this summer) chronic--reacts to different things in environment  "  Cardiovascular:  Negative      Gastroenterology: Negative      Genitourinary:  Positive for prostate problem    Musculoskeletal:  Positive for joint pain;arthritis (right knee)    Neurologic:  Negative      Psychiatric:  Negative      Heme/Lymph/Imm:  Positive for easy bruising    Endocrine:  Positive for diabetes DM 2    Physical Exam:  Vitals: /80  Pulse 76  Ht 1.727 m (5' 8\")  Wt 91.2 kg (201 lb)  BMI 30.56 kg/m2    Constitutional:  cooperative, alert and oriented, well developed, well nourished, in no acute distress        Skin:  warm and dry to the touch, no apparent skin lesions or masses noted          Head:  normocephalic, no masses or lesions        Eyes:  pupils equal and round, conjunctivae and lids unremarkable, sclera white, no xanthalasma, EOMS intact, no nystagmus        Lymph:No Cervical lymphadenopathy present     ENT:  no pallor or cyanosis, dentition good   halitosis    Neck:  carotid pulses are full and equal bilaterally, JVP normal, no carotid bruit        Respiratory:  normal breath sounds, clear to auscultation, normal A-P diameter, normal symmetry, normal respiratory excursion, no use of accessory muscles    wheeze bilat    Cardiac: regular rhythm, normal S1/S2, no S3 or S4, apical impulse not displaced, no murmurs, gallops or rubs   distant heart sounds         distant heart tones        3+                                  GI:  abdomen soft, non-tender, BS normoactive, no mass, no HSM, no bruits obese obese    Extremities and Muscular Skeletal:  no spinal abnormalities noted   bilateral LE edema;trace trace;pitting trace;pitting venous stasis changes    Neurological:  no gross motor deficits        Psych:  Alert and Oriented x 3 Anxious    Recent Lab Results:  LIPID RESULTS:  Lab Results   Component Value Date    CHOL 107 02/15/2018    HDL 38 (L) 02/15/2018    LDL 45 02/15/2018    TRIG 119 02/15/2018       LIVER ENZYME RESULTS:  Lab Results   Component Value Date    AST 21 " 11/28/2017    ALT <5 (L) 02/15/2018       CBC RESULTS:  Lab Results   Component Value Date    WBC 15.2 (H) 11/30/2017    RBC 4.76 11/30/2017    HGB 14.3 11/30/2017    HCT 41.6 11/30/2017    MCV 87 11/30/2017    MCH 30.0 11/30/2017    MCHC 34.4 11/30/2017    RDW 13.4 11/30/2017     11/30/2017       BMP RESULTS:  Lab Results   Component Value Date     12/13/2017    POTASSIUM 4.3 12/13/2017    CHLORIDE 100 12/13/2017    CO2 27 12/13/2017    ANIONGAP 13.3 12/13/2017     (H) 12/13/2017    BUN 19 12/13/2017    CR 1.00 12/13/2017    GFRESTIMATED 72 12/13/2017    GFRESTBLACK 87 12/13/2017    MACIEJ 9.7 12/13/2017        A1C RESULTS:  Lab Results   Component Value Date    A1C 6.0 11/28/2017       INR RESULTS:  No results found for: INR        CC  Magdy Collins MD  [id number]  [place] [id number]

## 2018-08-28 NOTE — LETTER
8/28/2018    Mpho Walt Guillory MD  [facility] [address]  [facility] [address]    RE: Cayden Dewitt       Dear Colleague,    I had the pleasure of seeing Cayden Dewitt in the [facility].    History of Present Illness:    Cayden Dewitt is a 82 year old male followed here by Dr. Collins.  We met him last November when he presented with a band of pain across his anterior chest.  He had a mildly elevated troponin.  He was premedicated for dye allergy with prednisone and Benadryl.  He went on for coronary angiogram was found to have a 90% lesion in the ramus stented with a 3.5 x 16 mm Promus Premier drug-eluting stent and a 90% PDA lesion stented with a 2.25 x 28 mm Promus Premier drug-eluting stent.  He has a 70% ostial diagonal lesion which is small and we are treating medically.  The second diagonal was closed.  The LAD had a 30% stenosis.    Echocardiogram revealed ejection fraction of 40-45% but has rebounded to normal.    He has a history of chronic bronchitis-type symptoms with a cough and wheezing.  We switched him to Bystolic but his formulary plan switching back to Coreg.  He is on an inhaler and we have referred him back to primary care to look into these issues if needed.    He has a dilated ascending aorta measuring 44 mm.  His blood pressures up and down and he is a bit of a highly anxious man.  Today he is over 140.  At times he states it is 130s but is checking his blood pressure at home with a wrist cuff which may not be accurate    At the last office visit, he felt he was having leg pain from Lipitor.  Dr. Collins instructed him in February to stop taking Lipitor and the pain got better. He restarted Atorvastatin and the pain came back.  At that point Dr. Collins wanted him to call but he did not and he has been off of his statin now for 6 months.  Baseline LDL when we met him was in the 90s.    He has mild ankle edema which may be his  amlodipine at 10 mg per day.  This is worse at night, abates in the morning.    He tells me he recently saw Urologist through the [facility] system.  This is Dr. Gricel Troncoso.  He was hoping to do a procedure to remove stones in the left ureter and place a stent.  I told this patient unless he can have this done on Aspirin and Plavix, we would suggest postponing this until November.  This caused a whole constellation of questions from the patient which I did my best to answer.    I subsequently called Dr. Gricel Troncoso's office and his nurses are checking with him to see if the procedure can be done on dual antiplatelet therapy.  If not they will need to discuss the merits of postponing it.    On exam today he has an occasional upper airway wheeze.  His edema is 1+ bilaterally.  Blood pressure today by me is around 140/76 palpated.    Impression/Plan:     1.  Coronary artery disease with a non-STEMI November 28, 2017 treated with Promus Premier drug-eluting stents into the ramus and proximal posterior descending artery  -He has ongoing diagonal stenosis a second close diagonal and 30% LAD which we will continue medical therapy for  -Continue dual antiplatelet therapy until November 28, 2017    2.  Normalization of LV function    3.  Hypertension uncontrolled particularly given his dilated aorta; ;ongoing edema  -Reduce amlodipine to 5 mg a day to see if his edema improves  -Increase losartan to 100 mg a day  -See me back September 14 with a BMP  -We have the option of increasing clonidine in the future if need be    4.  Dyslipidemia, now off statin therapy for 6 months  -I will trial Crestor 5 mg every other day.  When I see him back in September we will increase this if he is tolerating it and consider Co-Q-10.    5.  Ureteral stones in need of stents and lithotripsy  -Have contacted Dr. Lawrence Troncoso's office through [employer] and left a message with the nurses.  Unless they can do this on  dual antiplatelet therapy we would suggest waiting, if possible, until the end of November when his Plavix can safely be discontinued.      It has been a pleasure seeing Cayden ALVAREZ Lauralilo in follow up today.  He was somewhat of an anxious gentleman today and had multiple, multiple questions.  Greater than 50% of this one-hour visit today was spent in counseling    Laure Hughes, MSN, APRN-BC, CNP  Cardiology    No orders of the defined types were placed in this encounter.    Orders Placed This Encounter   Medications     rosuvastatin (CRESTOR) 5 MG tablet     Sig: One pill every other day and as directed     Dispense:  90 tablet     Refill:  3     amLODIPine (NORVASC) 5 MG tablet     Sig: Take 1 tablet (5 mg) by mouth daily     Dispense:  90 tablet     Refill:  3     Medications Discontinued During This Encounter   Medication Reason     atorvastatin (LIPITOR) 20 MG tablet      losartan (COZAAR) 50 MG tablet      amLODIPine (NORVASC) 10 MG tablet          Encounter Diagnoses   Name Primary?     NSTEMI (non-ST elevated myocardial infarction) (H)      Benign essential HTN      Mixed hyperlipidemia        CURRENT MEDICATIONS:  Current Outpatient Prescriptions   Medication Sig Dispense Refill     amLODIPine (NORVASC) 5 MG tablet Take 1 tablet (5 mg) by mouth daily 90 tablet 3     aspirin EC 81 MG EC tablet Take 1 tablet (81 mg) by mouth daily       carvedilol (COREG) 6.25 MG tablet Take 1 tablet (6.25 mg) by mouth 2 times daily (with meals) 180 tablet 3     CLONIDINE HCL PO Take 0.1 mg by mouth 2 times daily        clopidogrel (PLAVIX) 75 MG tablet Take 1 tablet (75 mg) by mouth daily 90 tablet 3     metFORMIN (GLUCOPHAGE) 1000 MG tablet Take 1 tablet (1,000 mg) by mouth 2 times daily (with meals) 60 tablet 0     Multiple Vitamins-Minerals (CENTRUM SILVER) per tablet Take 1 tablet by mouth daily       nitroGLYcerin (NITROSTAT) 0.4 MG sublingual tablet For chest pain place 1 tablet under the tongue every 5 minutes  for 3 doses. If symptoms persist 5 minutes after 1st dose call 911. 25 tablet 3     rosuvastatin (CRESTOR) 5 MG tablet One pill every other day and as directed 90 tablet 3     [DISCONTINUED] amLODIPine (NORVASC) 10 MG tablet Take 1 tablet (10 mg) by mouth daily 30 tablet 1       ALLERGIES     Allergies   Allergen Reactions     Ciprofloxacin      Contrast Dye      Facial and lip swelling     Iodine        PAST MEDICAL HISTORY:  Past Medical History:   Diagnosis Date     Ascending aorta dilatation (H)      Benign essential HTN      CAD (coronary artery disease)     11/29/17- ASA 3.5x16mm Promus to Ramux and ASA 2.48l95fv Promus to prox PDA     Hyperlipidemia      NSTEMI (non-ST elevated myocardial infarction) (H) 11/2017     90% lesion in the ramus stented with a 3.5 x 16 mm Promus Premier drug-eluting stent as well as a 90% PDA lesion stented with a 2.25 x 28 mm Promus Premier drug-eluting stent.LMA30%, 70% ostial Dl small vessel  treat medically. [id number] 100% The LAD was 30% diseased.  LV and diastolic pressure was 20     Type 2 diabetes mellitus without complications (H)      Uncomplicated asthma        PAST SURGICAL HISTORY:  Past Surgical History:   Procedure Laterality Date     CORONARY ANGIOGRAPHY ADULT ORDER  11/29/2017 11/29/17- ASA to Ramus, ASA to PDA, Lma 30%, Lad-mild 30, [id number] 70% ostial, D2 small 100%, Lcx mild     GENITOURINARY SURGERY      prostate       FAMILY HISTORY:  Family History   Problem Relation Age of Onset     Alzheimer Disease Mother        SOCIAL HISTORY:  Social History     Social History     Marital status:      Spouse name: N/A     Number of children: N/A     Years of education: N/A     Social History Main Topics     Smoking status: Never Smoker     Smokeless tobacco: Never Used     Alcohol use No     Drug use: None     Sexual activity: Not Asked     Other Topics Concern     None     Social History Narrative       Review of Systems:  Skin:  Positive for bruising     Eyes:   "Positive for glasses    ENT:  Positive for nasal congestion    Respiratory:  Positive for wheezing;cough (due to poor air quality this summer) chronic--reacts to different things in environment   Cardiovascular:  Negative      Gastroenterology: Negative      Genitourinary:  Positive for prostate problem    Musculoskeletal:  Positive for joint pain;arthritis (right knee)    Neurologic:  Negative      Psychiatric:  Negative      Heme/Lymph/Imm:  Positive for easy bruising    Endocrine:  Positive for diabetes DM 2    Physical Exam:  Vitals: /80  Pulse 76  Ht 1.727 m (5' 8\")  Wt 91.2 kg (201 lb)  BMI 30.56 kg/m2    Constitutional:  cooperative, alert and oriented, well developed, well nourished, in no acute distress        Skin:  warm and dry to the touch, no apparent skin lesions or masses noted          Head:  normocephalic, no masses or lesions        Eyes:  pupils equal and round, conjunctivae and lids unremarkable, sclera white, no xanthalasma, EOMS intact, no nystagmus        Lymph:No Cervical lymphadenopathy present     ENT:  no pallor or cyanosis, dentition good   halitosis    Neck:  carotid pulses are full and equal bilaterally, JVP normal, no carotid bruit        Respiratory:  normal breath sounds, clear to auscultation, normal A-P diameter, normal symmetry, normal respiratory excursion, no use of accessory muscles    wheeze bilat    Cardiac: regular rhythm, normal S1/S2, no S3 or S4, apical impulse not displaced, no murmurs, gallops or rubs   distant heart sounds         distant heart tones        3+                                  GI:  abdomen soft, non-tender, BS normoactive, no mass, no HSM, no bruits obese obese    Extremities and Muscular Skeletal:  no spinal abnormalities noted   bilateral LE edema;trace trace;pitting trace;pitting venous stasis changes    Neurological:  no gross motor deficits        Psych:  Alert and Oriented x 3 Anxious    Recent Lab Results:  LIPID RESULTS:  Lab Results "   Component Value Date    CHOL 107 02/15/2018    HDL 38 (L) 02/15/2018    LDL 45 02/15/2018    TRIG 119 02/15/2018       LIVER ENZYME RESULTS:  Lab Results   Component Value Date    AST 21 11/28/2017    ALT <5 (L) 02/15/2018       CBC RESULTS:  Lab Results   Component Value Date    WBC 15.2 (H) 11/30/2017    RBC 4.76 11/30/2017    HGB 14.3 11/30/2017    HCT 41.6 11/30/2017    MCV 87 11/30/2017    MCH 30.0 11/30/2017    MCHC 34.4 11/30/2017    RDW 13.4 11/30/2017     11/30/2017       BMP RESULTS:  Lab Results   Component Value Date     12/13/2017    POTASSIUM 4.3 12/13/2017    CHLORIDE 100 12/13/2017    CO2 27 12/13/2017    ANIONGAP 13.3 12/13/2017     (H) 12/13/2017    BUN 19 12/13/2017    CR 1.00 12/13/2017    GFRESTIMATED 72 12/13/2017    GFRESTBLACK 87 12/13/2017    MACIEJ 9.7 12/13/2017        A1C RESULTS:  Lab Results   Component Value Date    A1C 6.0 11/28/2017       INR RESULTS:  No results found for: INR      Thank you for allowing me to participate in the care of your patient.    Sincerely,     THOMAS Fiore CNP     [facility]

## 2018-08-28 NOTE — MR AVS SNAPSHOT
After Visit Summary   8/28/2018    Cayden Dewitt    MRN: [id number]           Patient Information     Date Of Birth          1935        Visit Information        Provider Department      8/28/2018 2:30 PM Laure Hughes APRN CNP [facility]        Today's Diagnoses     NSTEMI (non-ST elevated myocardial infarction) (H)        Benign essential HTN        Mixed hyperlipidemia          Care Instructions    Start Crestor 5mg every other day in the morning    Increase Losartan 100mg daily    Reduce amlodipine 5mg daily    If you get leg pain before that call me at [phone]    See me back September 14th with non-fasting labs and we will check your blood pressure and see if we can increase your Crestor    Bring your home blood pressure cuff to the next visit          Follow-ups after your visit        Additional Services     Follow-Up with Cardiac Advanced Practice Provider                 Future tests that were ordered for you today     Open Future Orders        Priority Expected Expires Ordered    Basic metabolic panel Routine 9/14/2018 8/28/2019 8/28/2018    Follow-Up with Cardiac Advanced Practice Provider Routine 9/14/2018 8/28/2019 8/28/2018            Who to contact     If you have questions or need follow up information about today's clinic visit or your schedule please contact [facility] directly at [phone].  Normal or non-critical lab and imaging results will be communicated to you by MyChart, letter or phone within 4 business days after the clinic has received the results. If you do not hear from us within 7 days, please contact the clinic through MyChart or phone. If you have a critical or abnormal lab result, we will notify you by phone as soon as possible.  Submit refill requests through [employer] or call your pharmacy and they will forward the refill request to us. Please allow 3 business days  "for your refill to be completed.          Additional Information About Your Visit        Care EveryWhere ID     This is your Care EveryWhere ID. This could be used by other organizations to access your [place] medical records  [id number]        Your Vitals Were     Pulse Height BMI (Body Mass Index)             76 1.727 m (5' 8\") 30.56 kg/m2          Blood Pressure from Last 3 Encounters:   08/28/18 140/80   02/22/18 138/82   12/13/17 118/70    Weight from Last 3 Encounters:   08/28/18 91.2 kg (201 lb)   02/22/18 93.4 kg (206 lb)   12/13/17 93 kg (205 lb)              We Performed the Following     Follow-Up with Cardiac Advanced Practice Provider          Today's Medication Changes          These changes are accurate as of 8/28/18  3:28 PM.  If you have any questions, ask your nurse or doctor.               Start taking these medicines.        Dose/Directions    rosuvastatin 5 MG tablet   Commonly known as:  CRESTOR   Used for:  Mixed hyperlipidemia   Started by:  Laure Hughes APRN CNP        One pill every other day and as directed   Quantity:  90 tablet   Refills:  3         These medicines have changed or have updated prescriptions.        Dose/Directions    amLODIPine 5 MG tablet   Commonly known as:  NORVASC   This may have changed:    - medication strength  - how much to take   Used for:  NSTEMI (non-ST elevated myocardial infarction) (H)   Changed by:  Laure Hughes APRN CNP        Dose:  5 mg   Take 1 tablet (5 mg) by mouth daily   Quantity:  90 tablet   Refills:  3         Stop taking these medicines if you haven't already. Please contact your care team if you have questions.     atorvastatin 20 MG tablet   Commonly known as:  LIPITOR   Stopped by:  Laure Hughes APRN CNP           losartan 50 MG tablet   Commonly known as:  COZAAR   Stopped by:  Laure Hughes APRN CNP                Where to get your medicines      These medications were sent to " [place], [place] - [address]  [address], [facility] [address]     Phone:  [phone]     amLODIPine 5 MG tablet    rosuvastatin 5 MG tablet                Primary Care Provider Office Phone # Fax #    Mpho Walt Guillory [phone]       [facility] [address]  [facility] [address]        Equal Access to Services     MÓNICA GREENE : Hadii aad ku hadasho Soomaali, waaxda luqadaha, qaybta kaalmada adeegyada, waxay idiin hayaan adeeg kharash la'aan ah. So [facility] [phone].    ATENCIÓN: Si habla español, tiene a jasmine disposición servicios gratuitos de asistencia lingüística. [facility] [phone].    We comply with applicable federal civil rights laws and [place] laws. We do not discriminate on the basis of race, color, national origin, age, disability, sex, sexual orientation, or gender identity.            Thank you!     Thank you for choosing [facility]  for your care. Our goal is always to provide you with excellent care. Hearing back from our patients is one way we can continue to improve our services. Please take a few minutes to complete the written survey that you may receive in the mail after your visit with us. Thank you!             Your Updated Medication List - Protect others around you: Learn how to safely use, store and throw away your medicines at www.disposemymeds.org.          This list is accurate as of 8/28/18  3:28 PM.  Always use your most recent med list.                   Brand Name Dispense Instructions for use Diagnosis    amLODIPine 5 MG tablet    NORVASC    90 tablet    Take 1 tablet (5 mg) by mouth daily    NSTEMI (non-ST elevated myocardial infarction) (H)       aspirin 81 MG EC tablet      Take 1 tablet (81 mg) by mouth daily    NSTEMI (non-ST elevated myocardial infarction) (H)       carvedilol 6.25 MG tablet    COREG    180 tablet    Take 1 tablet (6.25 mg) by  mouth 2 times daily (with meals)    NSTEMI (non-ST elevated myocardial infarction) (H)       CENTRUM SILVER per tablet      Take 1 tablet by mouth daily        CLONIDINE HCL PO      Take 0.1 mg by mouth 2 times daily        clopidogrel 75 MG tablet    PLAVIX    90 tablet    Take 1 tablet (75 mg) by mouth daily    NSTEMI (non-ST elevated myocardial infarction) (H)       metFORMIN 1000 MG tablet    GLUCOPHAGE    60 tablet    Take 1 tablet (1,000 mg) by mouth 2 times daily (with meals)    Type 2 diabetes mellitus with hyperglycemia, without long-term current use of insulin (H)       nitroGLYcerin 0.4 MG sublingual tablet    NITROSTAT    25 tablet    For chest pain place 1 tablet under the tongue every 5 minutes for 3 doses. If symptoms persist 5 minutes after 1st dose call 911.    NSTEMI (non-ST elevated myocardial infarction) (H)       rosuvastatin 5 MG tablet    CRESTOR    90 tablet    One pill every other day and as directed    Mixed hyperlipidemia

## 2018-08-28 NOTE — PATIENT INSTRUCTIONS
Start Crestor 5mg every other day in the morning    Increase Losartan 100mg daily    Reduce amlodipine 5mg daily    If you get leg pain before that call me at [phone]    See me back September 14th with non-fasting labs and we will check your blood pressure and see if we can increase your Crestor    Bring your home blood pressure cuff to the next visit

## 2018-10-30 ENCOUNTER — OFFICE VISIT (OUTPATIENT)
Dept: CARDIOLOGY | Facility: CLINIC | Age: 83
End: 2018-10-30
Attending: NURSE PRACTITIONER
Payer: COMMERCIAL

## 2018-10-30 VITALS
HEART RATE: 71 BPM | HEIGHT: 68 IN | BODY MASS INDEX: 29.98 KG/M2 | WEIGHT: 197.8 LBS | SYSTOLIC BLOOD PRESSURE: 168 MMHG | DIASTOLIC BLOOD PRESSURE: 98 MMHG

## 2018-10-30 DIAGNOSIS — I10 BENIGN ESSENTIAL HYPERTENSION: Primary | ICD-10-CM

## 2018-10-30 DIAGNOSIS — I10 BENIGN ESSENTIAL HTN: ICD-10-CM

## 2018-10-30 DIAGNOSIS — I21.4 NSTEMI (NON-ST ELEVATED MYOCARDIAL INFARCTION) (H): ICD-10-CM

## 2018-10-30 LAB
ALT SERPL W P-5'-P-CCNC: 24 U/L (ref 0–70)
ANION GAP SERPL CALCULATED.3IONS-SCNC: 11.1 MMOL/L (ref 6–17)
BUN SERPL-MCNC: 13 MG/DL (ref 7–30)
CALCIUM SERPL-MCNC: 9.7 MG/DL (ref 8.5–10.5)
CHLORIDE SERPL-SCNC: 103 MMOL/L (ref 98–107)
CHOLEST SERPL-MCNC: 110 MG/DL
CO2 SERPL-SCNC: 29 MMOL/L (ref 23–29)
CREAT SERPL-MCNC: 0.99 MG/DL (ref 0.7–1.3)
GFR SERPL CREATININE-BSD FRML MDRD: 72 ML/MIN/1.7M2
GLUCOSE SERPL-MCNC: 112 MG/DL (ref 70–105)
HDLC SERPL-MCNC: 36 MG/DL
LDLC SERPL CALC-MCNC: 41 MG/DL
NONHDLC SERPL-MCNC: 74 MG/DL
POTASSIUM SERPL-SCNC: 4.1 MMOL/L (ref 3.5–5.1)
SODIUM SERPL-SCNC: 139 MMOL/L (ref 136–145)
TRIGL SERPL-MCNC: 163 MG/DL

## 2018-10-30 PROCEDURE — 80061 LIPID PANEL: CPT | Performed by: NURSE PRACTITIONER

## 2018-10-30 PROCEDURE — 80048 BASIC METABOLIC PNL TOTAL CA: CPT | Performed by: NURSE PRACTITIONER

## 2018-10-30 PROCEDURE — 84460 ALANINE AMINO (ALT) (SGPT): CPT | Performed by: NURSE PRACTITIONER

## 2018-10-30 PROCEDURE — 36415 COLL VENOUS BLD VENIPUNCTURE: CPT | Performed by: NURSE PRACTITIONER

## 2018-10-30 PROCEDURE — 99215 OFFICE O/P EST HI 40 MIN: CPT | Performed by: NURSE PRACTITIONER

## 2018-10-30 RX ORDER — LOSARTAN POTASSIUM 50 MG/1
100 TABLET ORAL DAILY
Qty: 30 TABLET | COMMUNITY
Start: 2018-10-30 | End: 2018-10-30

## 2018-10-30 RX ORDER — LOSARTAN POTASSIUM 100 MG/1
100 TABLET ORAL DAILY
Qty: 90 TABLET | Refills: 3 | Status: SHIPPED | OUTPATIENT
Start: 2018-10-30

## 2018-10-30 RX ORDER — HYDRALAZINE HYDROCHLORIDE 10 MG/1
10 TABLET, FILM COATED ORAL 3 TIMES DAILY
Qty: 90 TABLET | Refills: 3 | Status: SHIPPED | OUTPATIENT
Start: 2018-10-30 | End: 2018-11-08

## 2018-10-30 NOTE — LETTER
10/30/2018    Mpho Walt Guillory MD  [facility] [address]  [facility] [address]    RE: Cayden Dewitt       Dear Colleague,    I had the pleasure of seeing Cayden Dewitt in the [facility].    History of Present Illness:    Cayden Dewitt is a 82 year old male followed here by Dr. Collins.  We met him last November when he presented with a band of pain across his anterior chest.  He had a mildly elevated troponin.  He was premedicated for dye allergy with prednisone and Benadryl.  He went on for coronary angiogram was found to have a 90% lesion in the ramus stented with a 3.5 x 16 mm Promus Premier drug-eluting stent and a 90% PDA lesion stented with a 2.25 x 28 mm Promus Premier drug-eluting stent.  He has a 70% ostial diagonal lesion which is small and we are treating medically.  The second diagonal was closed.  The LAD had a 30% stenosis.     Echocardiogram revealed ejection fraction of 40-45% but has rebounded to normal.     He has a history of chronic bronchitis-type symptoms with a cough and wheezing.  We switched him to Bystolic but his [employer] formulary plan switched him back to Coreg.  He is on an inhaler and we have referred him back to primary care to look into these issues if needed.  His primary care mentions putting him on a more cardioselective beta-blocker but they actually did not do this and the cardioselective choices are cost porohibitive and will require a PA through [employer]. He has had occasional wheezing.  A chest x-ray was performed suspicious for 2 right lung nodules. He is scheduled for a CT scan in the near future through the [facility] system. BNP was checked at  and is normal.  After the first of the year he is not able to see us anymore due to insurance changing.     He has a dilated ascending aorta measuring 44 mm.  His blood pressures have actually been elevated since we last saw him.  I had  increased his losartan at the last office visit but that did not happen.  He saw pharmacist in the [facility] system who put him on hydrochlorothiazide which never got started either because his primary care physician told him he should not be taking that and I assume is because he still has ongoing issues with kidney stones.  Primary care recently increased his clonidine to 0.05 mg 3 times per day and is complaining of a dry mouth.      He sees a urologist through the [facility] system (Dr. Gricel Troncoso).  He was willing to do the procedure on dual antiplatelet therapy but the dictation tells me that he went in to have it done and it was canceled because he was having a bit of breathlessness.    At the last office visit, he felt he was having leg pain from Lipitor.  Dr. Collins instructed him in February to stop taking Lipitor and the pain got better. He restarted Atorvastatin and the pain came back.  At that point Dr. Collins wanted him to call but he did not and he had been off of his statin now for 6 months.  Baseline LDL when we met him was in the 90s.  I started Crestor at the last office visit which he has tolerated well at 5 mg/day and in fact his LDL is 41 HDL 36 triglyceride 163 even though this is not high intensity dosing.  I was hesitant to use high intensity dosing because it could potentially give him myalgias.  His ALT is normal at 24     He had ankle edema when I saw him last time and I reduced his amlodipine back to 5 mg/day.  The edema has improved.    He tells me [facility] is also encouraged him to have a sleep study which I agree with-the consult is set up in our systsem.  He is quite an anxious gentleman and I believe has some whitecoat hypertension on top of benign hypertension.  He brought his blood pressure cuff in today and it is quite accurate.  His home blood pressures have been 140-150 and is clearly higher when he is here in clinic.    He has had no further chest  discomfort.    On exam today he has diminished breath sounds but I do not appreciate a wheeze.  He has trace edema bilaterally.      Impression/Plan:      1.  Coronary artery disease with a non-STEMI November 28, 2017 treated with Promus Premier drug-eluting stents into the ramus and proximal posterior descending artery  -He has ongoing first diagonal stenosis of 60% with a chronically occluded second diagonal which has collateral flow left to left and 30% LAD which we will continue to treat medically  -Continue dual antiplatelet therapy until November 28, 2018   -aspirin lifelong     2.  Normalization of LV function     3.  Hypertension uncontrolled particularly given his dilated aorta  -on multiple agents  -amlodipine to 5 mg a day as edema improved on lower dose  -Increase losartan to 100 mg a day which did not happen after last visit  -bmp is normal today  -continue coreg and hesitant to increase due to reported wheezing  -consider bystolic or zebata through [employer] which would need PA  - add hydralazine 10mg three time daily  -renal artery ultrasound to look for MARY  -agree with sleep study  -low sodium diet  -Avoid diuretics until his kidney stones have been taken care of  -Basic metabolic panel at his next follow-up visit given the increase in his losartan  -I will have a nurse assess his BP next week when he is here for his renal ultrasound and try to wean down clonidine if possible.    4.  Dyslipidemia,    -Intolerant of Lipitor due to leg pain  -Tolerating Crestor well at 5 mg/day with excellent control of his lipids even though it is not high intensity.      5.  Ureteral stones in need of stents and lithotripsy  -Urologist is Dr. Lawrence Troncoso's office through [facility]  -He would be able to proceed with lithotripsy at the end of November on one antiplatelet agent as Plavix is due to be stopped as noted above       6.  Chest x-ray recently suspicious for 2 lung nodules in the right  lung  -Left lung non-smoker  -No lung cancer in first-degree relatives  -CT of the chest is planned through [employer] for follow-up      It has been a pleasure seeing Cayden ALVAREZ Lauralilo in follow up today.  He was somewhat of an anxious gentleman today and had multiple, multiple questions.  Unfortunately he will not be able to be followed long-term in our system.  He is asking to see Dr. Collins back once more before the end of the year and I have scheduled that in 2 weeks time to follow-up on his blood pressure.  We will have the renal artery image performed before that time.    greater than 50% of this 1 hour visit today was spent in counseling     Laure Hughes, MSN, APRN-BC, CNP  Cardiology    No orders of the defined types were placed in this encounter.    No orders of the defined types were placed in this encounter.    There are no discontinued medications.      Encounter Diagnosis   Name Primary?     NSTEMI (non-ST elevated myocardial infarction) (H)        CURRENT MEDICATIONS:  Current Outpatient Prescriptions   Medication Sig Dispense Refill     amLODIPine (NORVASC) 5 MG tablet Take 1 tablet (5 mg) by mouth daily 90 tablet 3     aspirin EC 81 MG EC tablet Take 1 tablet (81 mg) by mouth daily       carvedilol (COREG) 6.25 MG tablet Take 1 tablet (6.25 mg) by mouth 2 times daily (with meals) 180 tablet 3     CLONIDINE HCL PO Take 0.1 mg by mouth 2 times daily        clopidogrel (PLAVIX) 75 MG tablet Take 1 tablet (75 mg) by mouth daily 90 tablet 3     metFORMIN (GLUCOPHAGE) 1000 MG tablet Take 1 tablet (1,000 mg) by mouth 2 times daily (with meals) 60 tablet 0     Multiple Vitamins-Minerals (CENTRUM SILVER) per tablet Take 1 tablet by mouth daily       nitroGLYcerin (NITROSTAT) 0.4 MG sublingual tablet For chest pain place 1 tablet under the tongue every 5 minutes for 3 doses. If symptoms persist 5 minutes after 1st dose call 911. 25 tablet 3     rosuvastatin (CRESTOR) 5 MG tablet One pill every  other day and as directed 90 tablet 3       ALLERGIES     Allergies   Allergen Reactions     Ciprofloxacin      Contrast Dye      Facial and lip swelling     Iodine        PAST MEDICAL HISTORY:  Past Medical History:   Diagnosis Date     Ascending aorta dilatation (H)      Benign essential HTN      CAD (coronary artery disease)     11/29/17- ASA 3.5x16mm Promus to Ramux and ASA 2.88n53pu Promus to prox PDA     Hyperlipidemia      NSTEMI (non-ST elevated myocardial infarction) (H) 11/2017     90% lesion in the ramus stented with a 3.5 x 16 mm Promus Premier drug-eluting stent as well as a 90% PDA lesion stented with a 2.25 x 28 mm Promus Premier drug-eluting stent.LMA30%, 70% ostial Dl small vessel  treat medically. L1glmhc 100% The LAD was 30% diseased.  LV and diastolic pressure was 20     Type 2 diabetes mellitus without complications (H)      Uncomplicated asthma        PAST SURGICAL HISTORY:  Past Surgical History:   Procedure Laterality Date     CORONARY ANGIOGRAPHY ADULT ORDER  11/29/2017 11/29/17- ASA to Ramus, ASA to PDA, Lma 30%, Lad-mild 30, R7suzzo 70% ostial, D2 small 100%, Lcx mild     GENITOURINARY SURGERY      prostate       FAMILY HISTORY:  Family History   Problem Relation Age of Onset     Alzheimer Disease Mother        SOCIAL HISTORY:  Social History     Social History     Marital status:      Spouse name: N/A     Number of children: N/A     Years of education: N/A     Social History Main Topics     Smoking status: Never Smoker     Smokeless tobacco: Never Used     Alcohol use No     Drug use: None     Sexual activity: Not Asked     Other Topics Concern     None     Social History Narrative       Review of Systems:  Skin:          Eyes:         ENT:         Respiratory:          Cardiovascular:         Gastroenterology:        Genitourinary:         Musculoskeletal:         Neurologic:         Psychiatric:         Heme/Lymph/Imm:         Endocrine:           Physical Exam:  Vitals: Ht  "1.727 m (5' 8\")  Wt 89.7 kg (197 lb 12.8 oz)  BMI 30.08 kg/m2    Constitutional:           Skin:             Head:           Eyes:           Lymph:      ENT:           Neck:           Respiratory:            Cardiac:                                                           GI:           Extremities and Muscular Skeletal:                 Neurological:           Psych:         Recent Lab Results:  LIPID RESULTS:  Lab Results   Component Value Date    CHOL 107 02/15/2018    HDL 38 (L) 02/15/2018    LDL 45 02/15/2018    TRIG 119 02/15/2018       LIVER ENZYME RESULTS:  Lab Results   Component Value Date    AST 21 11/28/2017    ALT <5 (L) 02/15/2018       CBC RESULTS:  Lab Results   Component Value Date    WBC 15.2 (H) 11/30/2017    RBC 4.76 11/30/2017    HGB 14.3 11/30/2017    HCT 41.6 11/30/2017    MCV 87 11/30/2017    MCH 30.0 11/30/2017    MCHC 34.4 11/30/2017    RDW 13.4 11/30/2017     11/30/2017       BMP RESULTS:  Lab Results   Component Value Date     10/30/2018    POTASSIUM 4.1 10/30/2018    CHLORIDE 103 10/30/2018    CO2 29 10/30/2018    ANIONGAP 11.1 10/30/2018     (H) 10/30/2018    BUN 13 10/30/2018    CR 0.99 10/30/2018    GFRESTIMATED 72 10/30/2018    GFRESTBLACK 87 10/30/2018    MACIEJ 9.7 10/30/2018        A1C RESULTS:  Lab Results   Component Value Date    A1C 6.0 11/28/2017       INR RESULTS:  No results found for: INR        CC  THOMAS Fiore CNP  [address]  [place] [address]                  Thank you for allowing me to participate in the care of your patient.      Sincerely,     THOMAS Fiore CNP     [facility]    cc:   THOMAS Fiore CNP  [address]  [place] [address]        "

## 2018-10-30 NOTE — LETTER
10/30/2018    Mpho Walt Guillory MD  [facility] [address]  [facility] [address]    RE: Cayden Dewitt       Dear Colleague,    I had the pleasure of seeing Cayden Dewitt in the [facility].    History of Present Illness:    Cayden Dewitt is a 82 year old male followed here by Dr. Collins.  We met him last November when he presented with a band of pain across his anterior chest.  He had a mildly elevated troponin.  He was premedicated for dye allergy with prednisone and Benadryl.  He went on for coronary angiogram was found to have a 90% lesion in the ramus stented with a 3.5 x 16 mm Promus Premier drug-eluting stent and a 90% PDA lesion stented with a 2.25 x 28 mm Promus Premier drug-eluting stent.  He has a 70% ostial diagonal lesion which is small and we are treating medically.  The second diagonal was closed.  The LAD had a 30% stenosis.     Echocardiogram revealed ejection fraction of 40-45% but has rebounded to normal.     He has a history of chronic bronchitis-type symptoms with a cough and wheezing.  We switched him to Bystolic but his [employer] formulary plan switched him back to Coreg.  He is on an inhaler and we have referred him back to primary care to look into these issues if needed.  His primary care mentions putting him on a more cardioselective beta-blocker but they actually did not do this and the cardioselective choices are cost porohibitive and will require a PA through [employer]. He has had occasional wheezing.  A chest x-ray was performed suspicious for 2 right lung nodules. He is scheduled for a CT scan in the near future through the [facility] system. BNP was checked at  and is normal.  After the first of the year he is not able to see us anymore due to insurance changing.     He has a dilated ascending aorta measuring 44 mm.  His blood pressures have actually been elevated since we last saw him.  I had  increased his losartan at the last office visit but that did not happen.  He saw pharmacist in the [facility] system who put him on hydrochlorothiazide which never got started either because his primary care physician told him he should not be taking that and I assume is because he still has ongoing issues with kidney stones.  Primary care recently increased his clonidine to 0.05 mg 3 times per day and is complaining of a dry mouth.      He sees a urologist through the [facility] system (Dr. Gricel Troncoso).  He was willing to do the procedure on dual antiplatelet therapy but the dictation tells me that he went in to have it done and it was canceled because he was having a bit of breathlessness.    At the last office visit, he felt he was having leg pain from Lipitor.  Dr. Collins instructed him in February to stop taking Lipitor and the pain got better. He restarted Atorvastatin and the pain came back.  At that point Dr. Collins wanted him to call but he did not and he had been off of his statin now for 6 months.  Baseline LDL when we met him was in the 90s.  I started Crestor at the last office visit which he has tolerated well at 5 mg/day and in fact his LDL is 41 HDL 36 triglyceride 163 even though this is not high intensity dosing.  I was hesitant to use high intensity dosing because it could potentially give him myalgias.  His ALT is normal at 24     He had ankle edema when I saw him last time and I reduced his amlodipine back to 5 mg/day.  The edema has improved.    He tells me [facility] is also encouraged him to have a sleep study which I agree with-the consult is set up in our systsem.  He is quite an anxious gentleman and I believe has some whitecoat hypertension on top of benign hypertension.  He brought his blood pressure cuff in today and it is quite accurate.  His home blood pressures have been 140-150 and is clearly higher when he is here in clinic.    He has had no further chest  discomfort.    On exam today he has diminished breath sounds but I do not appreciate a wheeze.  He has trace edema bilaterally.      Impression/Plan:      1.  Coronary artery disease with a non-STEMI November 28, 2017 treated with Promus Premier drug-eluting stents into the ramus and proximal posterior descending artery  -He has ongoing first diagonal stenosis of 60% with a chronically occluded second diagonal which has collateral flow left to left and 30% LAD which we will continue to treat medically  -Continue dual antiplatelet therapy until November 28, 2018   -aspirin lifelong     2.  Normalization of LV function     3.  Hypertension uncontrolled particularly given his dilated aorta  -on multiple agents  -amlodipine to 5 mg a day as edema improved on lower dose  -Increase losartan to 100 mg a day which did not happen after last visit  -bmp is normal today  -continue coreg and hesitant to increase due to reported wheezing  -consider bystolic or zebata through [employer] which would need PA  - add hydralazine 10mg three time daily  -renal artery ultrasound to look for MARY  -agree with sleep study  -low sodium diet  -Avoid diuretics until his kidney stones have been taken care of  -Basic metabolic panel at his next follow-up visit given the increase in his losartan  -I will have a nurse assess his BP next week when he is here for his renal ultrasound and try to wean down clonidine if possible.    4.  Dyslipidemia,    -Intolerant of Lipitor due to leg pain  -Tolerating Crestor well at 5 mg/day with excellent control of his lipids even though it is not high intensity.      5.  Ureteral stones in need of stents and lithotripsy  -Urologist is Dr. Lawrence Troncoso's office through [email or web address]  -He would be able to proceed with lithotripsy at the end of November on one antiplatelet agent as Plavix is due to be stopped as noted above       6.  Chest x-ray recently suspicious for 2 lung nodules in the right  lung  -Left lung non-smoker  -No lung cancer in first-degree relatives  -CT of the chest is planned through [facility] for follow-up      It has been a pleasure seeing Cayden ALVAREZ Lauralilo in follow up today.  He was somewhat of an anxious gentleman today and had multiple, multiple questions.  Unfortunately he will not be able to be followed long-term in our system.  He is asking to see Dr. Collins back once more before the end of the year and I have scheduled that in 2 weeks time to follow-up on his blood pressure.  We will have the renal artery image performed before that time.    greater than 50% of this 1 hour visit today was spent in counseling     Laure Hughes, MSN, APRN-BC, CNP  Cardiology    No orders of the defined types were placed in this encounter.    No orders of the defined types were placed in this encounter.    There are no discontinued medications.      Encounter Diagnosis   Name Primary?     NSTEMI (non-ST elevated myocardial infarction) (H)        CURRENT MEDICATIONS:  Current Outpatient Prescriptions   Medication Sig Dispense Refill     amLODIPine (NORVASC) 5 MG tablet Take 1 tablet (5 mg) by mouth daily 90 tablet 3     aspirin EC 81 MG EC tablet Take 1 tablet (81 mg) by mouth daily       carvedilol (COREG) 6.25 MG tablet Take 1 tablet (6.25 mg) by mouth 2 times daily (with meals) 180 tablet 3     CLONIDINE HCL PO Take 0.1 mg by mouth 2 times daily        clopidogrel (PLAVIX) 75 MG tablet Take 1 tablet (75 mg) by mouth daily 90 tablet 3     metFORMIN (GLUCOPHAGE) 1000 MG tablet Take 1 tablet (1,000 mg) by mouth 2 times daily (with meals) 60 tablet 0     Multiple Vitamins-Minerals (CENTRUM SILVER) per tablet Take 1 tablet by mouth daily       nitroGLYcerin (NITROSTAT) 0.4 MG sublingual tablet For chest pain place 1 tablet under the tongue every 5 minutes for 3 doses. If symptoms persist 5 minutes after 1st dose call 911. 25 tablet 3     rosuvastatin (CRESTOR) 5 MG tablet One pill every  other day and as directed 90 tablet 3       ALLERGIES     Allergies   Allergen Reactions     Ciprofloxacin      Contrast Dye      Facial and lip swelling     Iodine        PAST MEDICAL HISTORY:  Past Medical History:   Diagnosis Date     Ascending aorta dilatation (H)      Benign essential HTN      CAD (coronary artery disease)     11/29/17- ASA 3.5x16mm Promus to Ramux and ASA [id number] Promus to prox PDA     Hyperlipidemia      NSTEMI (non-ST elevated myocardial infarction) (H) 11/2017     90% lesion in the ramus stented with a 3.5 x 16 mm Promus Premier drug-eluting stent as well as a 90% PDA lesion stented with a 2.25 x 28 mm Promus Premier drug-eluting stent.LMA30%, 70% ostial Dl small vessel  treat medically. [id number] 100% The LAD was 30% diseased.  LV and diastolic pressure was 20     Type 2 diabetes mellitus without complications (H)      Uncomplicated asthma        PAST SURGICAL HISTORY:  Past Surgical History:   Procedure Laterality Date     CORONARY ANGIOGRAPHY ADULT ORDER  11/29/2017 11/29/17- ASA to Ramus, ASA to PDA, Lma 30%, Lad-mild 30, [id number] 70% ostial, D2 small 100%, Lcx mild     GENITOURINARY SURGERY      prostate       FAMILY HISTORY:  Family History   Problem Relation Age of Onset     Alzheimer Disease Mother        SOCIAL HISTORY:  Social History     Social History     Marital status:      Spouse name: N/A     Number of children: N/A     Years of education: N/A     Social History Main Topics     Smoking status: Never Smoker     Smokeless tobacco: Never Used     Alcohol use No     Drug use: None     Sexual activity: Not Asked     Other Topics Concern     None     Social History Narrative       Review of Systems:  Skin:          Eyes:         ENT:         Respiratory:          Cardiovascular:         Gastroenterology:        Genitourinary:         Musculoskeletal:         Neurologic:         Psychiatric:         Heme/Lymph/Imm:         Endocrine:           Physical Exam:  Vitals: Ht  "1.727 m (5' 8\")  Wt 89.7 kg (197 lb 12.8 oz)  BMI 30.08 kg/m2    Constitutional:           Skin:             Head:           Eyes:           Lymph:      ENT:           Neck:           Respiratory:            Cardiac:                                                           GI:           Extremities and Muscular Skeletal:                 Neurological:           Psych:         Recent Lab Results:  LIPID RESULTS:  Lab Results   Component Value Date    CHOL 107 02/15/2018    HDL 38 (L) 02/15/2018    LDL 45 02/15/2018    TRIG 119 02/15/2018       LIVER ENZYME RESULTS:  Lab Results   Component Value Date    AST 21 11/28/2017    ALT <5 (L) 02/15/2018       CBC RESULTS:  Lab Results   Component Value Date    WBC 15.2 (H) 11/30/2017    RBC 4.76 11/30/2017    HGB 14.3 11/30/2017    HCT 41.6 11/30/2017    MCV 87 11/30/2017    MCH 30.0 11/30/2017    MCHC 34.4 11/30/2017    RDW 13.4 11/30/2017     11/30/2017       BMP RESULTS:  Lab Results   Component Value Date     10/30/2018    POTASSIUM 4.1 10/30/2018    CHLORIDE 103 10/30/2018    CO2 29 10/30/2018    ANIONGAP 11.1 10/30/2018     (H) 10/30/2018    BUN 13 10/30/2018    CR 0.99 10/30/2018    GFRESTIMATED 72 10/30/2018    GFRESTBLACK 87 10/30/2018    MACIEJ 9.7 10/30/2018        A1C RESULTS:  Lab Results   Component Value Date    A1C 6.0 11/28/2017       INR RESULTS:  No results found for: INR        Thank you for allowing me to participate in the care of your patient.    Sincerely,     THOMAS [facility]    "

## 2018-10-30 NOTE — MR AVS SNAPSHOT
After Visit Summary   10/30/2018    Cayden Dewitt    MRN: [id number]           Patient Information     Date Of Birth          1935        Visit Information        Provider Department      10/30/2018 2:30 PM Laure Hughes APRN CNP [facility]        Today's Diagnoses     Benign essential hypertension    -  1    NSTEMI (non-ST elevated myocardial infarction) (H)          Care Instructions    I will let you now what your cholesterol levels are today  Your kidney tests are normal    Your blood pressure is high and your cuff is accurate    PLAN:  Increase losartan to 100mg=2 of your current pills  OR one of the new prescription I sent to pharmacy  -add Hydralazine 10mg three times daily with your Clonidine dosing  -see Dr. Collins on 11-12 for follow up  -no diuretics due to kidney stones  -schedule a renal ultrasound prior to your visit with Dr. Collins  -agree that you need a sleep study          Follow-ups after your visit        Future tests that were ordered for you today     Open Future Orders        Priority Expected Expires Ordered    US Renal Complete w Doppler Complete Routine 11/14/2018 10/30/2019 10/30/2018            Who to contact     If you have questions or need follow up information about today's clinic visit or your schedule please contact [facility] directly at [phone].  Normal or non-critical lab and imaging results will be communicated to you by MyChart, letter or phone within 4 business days after the clinic has received the results. If you do not hear from us within 7 days, please contact the clinic through MyChart or phone. If you have a critical or abnormal lab result, we will notify you by phone as soon as possible.  Submit refill requests through [employer] or call your pharmacy and they will forward the refill request to us. Please allow 3 business days for your refill to be  "completed.          Additional Information About Your Visit        Care EveryWhere ID     This is your Care EveryWhere ID. This could be used by other organizations to access your [facility] medical records  [id number]        Your Vitals Were     Pulse Height BMI (Body Mass Index)             71 1.727 m (5' 8\") 30.08 kg/m2          Blood Pressure from Last 3 Encounters:   10/30/18 (!) 168/98   08/28/18 140/80   02/22/18 138/82    Weight from Last 3 Encounters:   10/30/18 89.7 kg (197 lb 12.8 oz)   08/28/18 91.2 kg (201 lb)   02/22/18 93.4 kg (206 lb)              We Performed the Following     Follow-Up with Cardiac Advanced Practice Provider          Today's Medication Changes          These changes are accurate as of 10/30/18  3:06 PM.  If you have any questions, ask your nurse or doctor.               Start taking these medicines.        Dose/Directions    hydrALAZINE 10 MG tablet   Commonly known as:  APRESOLINE   Used for:  Benign essential hypertension   Started by:  Laure Hugehs APRN CNP        Dose:  10 mg   Take 1 tablet (10 mg) by mouth 3 times daily   Quantity:  90 tablet   Refills:  3         These medicines have changed or have updated prescriptions.        Dose/Directions    losartan 100 MG tablet   Commonly known as:  COZAAR   This may have changed:  medication strength   Used for:  Benign essential hypertension   Changed by:  Laure Hughes APRN CNP        Dose:  100 mg   Take 1 tablet (100 mg) by mouth daily   Quantity:  90 tablet   Refills:  3            Where to get your medicines      These medications were sent to [facility] [address]  [address] [facility] [address]     Phone:  [phone]     hydrALAZINE 10 MG tablet    losartan 100 MG tablet                Primary Care Provider Office Phone # Fax #    Mpho Walt Guillory [phone]       [facility] [address]  [place] " [place] [address]        Equal Access to Services     MÓNICA GREENE : Hadii delmi duran chun [place], waaxda luqadaha, qaybta kaalmada alondra, esther raymond. So [facility] [phone].    ATENCIÓN: Si habla español, tiene a jasmine disposición servicios gratuitos de asistencia lingüística. Anaid al [phone].    We comply with applicable federal civil rights laws and [place] laws. We do not discriminate on the basis of race, color, national origin, age, disability, sex, sexual orientation, or gender identity.            Thank you!     Thank you for choosing [facility]  for your care. Our goal is always to provide you with excellent care. Hearing back from our patients is one way we can continue to improve our services. Please take a few minutes to complete the written survey that you may receive in the mail after your visit with us. Thank you!             Your Updated Medication List - Protect others around you: Learn how to safely use, store and throw away your medicines at www.disposemymeds.org.          This list is accurate as of 10/30/18  3:06 PM.  Always use your most recent med list.                   Brand Name Dispense Instructions for use Diagnosis    amLODIPine 5 MG tablet    NORVASC    90 tablet    Take 1 tablet (5 mg) by mouth daily    NSTEMI (non-ST elevated myocardial infarction) (H)       aspirin 81 MG EC tablet      Take 1 tablet (81 mg) by mouth daily    NSTEMI (non-ST elevated myocardial infarction) (H)       carvedilol 6.25 MG tablet    COREG    180 tablet    Take 1 tablet (6.25 mg) by mouth 2 times daily (with meals)    NSTEMI (non-ST elevated myocardial infarction) (H)       CENTRUM SILVER per tablet      Take 1 tablet by mouth daily        CLONIDINE HCL PO      Take 0.05 mg by mouth 3 times daily        clopidogrel 75 MG tablet    PLAVIX    90 tablet    Take 1 tablet (75 mg) by mouth daily    NSTEMI (non-ST elevated myocardial infarction)  (H)       hydrALAZINE 10 MG tablet    APRESOLINE    90 tablet    Take 1 tablet (10 mg) by mouth 3 times daily    Benign essential hypertension       losartan 100 MG tablet    COZAAR    90 tablet    Take 1 tablet (100 mg) by mouth daily    Benign essential hypertension       metFORMIN 1000 MG tablet    GLUCOPHAGE    60 tablet    Take 1 tablet (1,000 mg) by mouth 2 times daily (with meals)    Type 2 diabetes mellitus with hyperglycemia, without long-term current use of insulin (H)       nitroGLYcerin 0.4 MG sublingual tablet    NITROSTAT    25 tablet    For chest pain place 1 tablet under the tongue every 5 minutes for 3 doses. If symptoms persist 5 minutes after 1st dose call 911.    NSTEMI (non-ST elevated myocardial infarction) (H)       rosuvastatin 5 MG tablet    CRESTOR    90 tablet    One pill every other day and as directed    Mixed hyperlipidemia

## 2018-10-30 NOTE — PATIENT INSTRUCTIONS
I will let you now what your cholesterol levels are today  Your kidney tests are normal    Your blood pressure is high and your cuff is accurate    PLAN:  Increase losartan to 100mg=2 of your current pills  OR one of the new prescription I sent to pharmacy  -add Hydralazine 10mg three times daily with your Clonidine dosing  -see Dr. Collins on 11-12 for follow up  -no diuretics due to kidney stones  -schedule a renal ultrasound prior to your visit with Dr. Collins  -agree that you need a sleep study

## 2018-10-30 NOTE — PROGRESS NOTES
History of Present Illness:    Cayden Dewitt is a 82 year old male followed here by Dr. Collins.  We met him last November when he presented with a band of pain across his anterior chest.  He had a mildly elevated troponin.  He was premedicated for dye allergy with prednisone and Benadryl.  He went on for coronary angiogram was found to have a 90% lesion in the ramus stented with a 3.5 x 16 mm Promus Premier drug-eluting stent and a 90% PDA lesion stented with a 2.25 x 28 mm Promus Premier drug-eluting stent.  He has a 70% ostial diagonal lesion which is small and we are treating medically.  The second diagonal was closed.  The LAD had a 30% stenosis.     Echocardiogram revealed ejection fraction of 40-45% but has rebounded to normal.     He has a history of chronic bronchitis-type symptoms with a cough and wheezing.  We switched him to Bystolic but his [employer] formulary plan switched him back to Coreg.  He is on an inhaler and we have referred him back to primary care to look into these issues if needed.  His primary care mentions putting him on a more cardioselective beta-blocker but they actually did not do this and the cardioselective choices are cost porohibitive and will require a PA through [employer]. He has had occasional wheezing.  A chest x-ray was performed suspicious for 2 right lung nodules. He is scheduled for a CT scan in the near future through the [facility] system. BNP was checked at  and is normal.  After the first of the year he is not able to see us anymore due to insurance changing.     He has a dilated ascending aorta measuring 44 mm.  His blood pressures have actually been elevated since we last saw him.  I had increased his losartan at the last office visit but that did not happen.  He saw pharmacist in the [employer] system who put him on hydrochlorothiazide which never got started either because his primary care physician told him he should not be taking that  and I assume is because he still has ongoing issues with kidney stones.  Primary care recently increased his clonidine to 0.05 mg 3 times per day and is complaining of a dry mouth.      He sees a urologist through the [facility] system (Dr. Gricel Troncoso).  He was willing to do the procedure on dual antiplatelet therapy but the dictation tells me that he went in to have it done and it was canceled because he was having a bit of breathlessness.    At the last office visit, he felt he was having leg pain from Lipitor.  Dr. Collins instructed him in February to stop taking Lipitor and the pain got better. He restarted Atorvastatin and the pain came back.  At that point Dr. Collins wanted him to call but he did not and he had been off of his statin now for 6 months.  Baseline LDL when we met him was in the 90s.  I started Crestor at the last office visit which he has tolerated well at 5 mg/day and in fact his LDL is 41 HDL 36 triglyceride 163 even though this is not high intensity dosing.  I was hesitant to use high intensity dosing because it could potentially give him myalgias.  His ALT is normal at 24     He had ankle edema when I saw him last time and I reduced his amlodipine back to 5 mg/day.  The edema has improved.    He tells me [facility] is also encouraged him to have a sleep study which I agree with-the consult is set up in our systsem.  He is quite an anxious gentleman and I believe has some whitecoat hypertension on top of benign hypertension.  He brought his blood pressure cuff in today and it is quite accurate.  His home blood pressures have been 140-150 and is clearly higher when he is here in clinic.    He has had no further chest discomfort.    On exam today he has diminished breath sounds but I do not appreciate a wheeze.  He has trace edema bilaterally.      Impression/Plan:      1.  Coronary artery disease with a non-STEMI November 28, 2017 treated with Promus Premier drug-eluting  stents into the ramus and proximal posterior descending artery  -He has ongoing first diagonal stenosis of 60% with a chronically occluded second diagonal which has collateral flow left to left and 30% LAD which we will continue to treat medically  -Continue dual antiplatelet therapy until November 28, 2018   -aspirin lifelong     2.  Normalization of LV function     3.  Hypertension uncontrolled particularly given his dilated aorta  -on multiple agents  -amlodipine to 5 mg a day as edema improved on lower dose  -Increase losartan to 100 mg a day which did not happen after last visit  -bmp is normal today  -continue coreg and hesitant to increase due to reported wheezing  -consider bystolic or zebata through [facility] which would need PA  - add hydralazine 10mg three time daily  -renal artery ultrasound to look for MARY  -agree with sleep study  -low sodium diet  -Avoid diuretics until his kidney stones have been taken care of  -Basic metabolic panel at his next follow-up visit given the increase in his losartan  -I will have a nurse assess his BP next week when he is here for his renal ultrasound and try to wean down clonidine if possible.    4.  Dyslipidemia,   -Intolerant of Lipitor due to leg pain  -Tolerating Crestor well at 5 mg/day with excellent control of his lipids even though it is not high intensity.      5.  Ureteral stones in need of stents and lithotripsy  -Urologist is Dr. Lawrence Troncoso's office through [facility]  -He would be able to proceed with lithotripsy at the end of November on one antiplatelet agent as Plavix is due to be stopped as noted above       6.  Chest x-ray recently suspicious for 2 lung nodules in the right lung  -Left lung non-smoker  -No lung cancer in first-degree relatives  -CT of the chest is planned through [facility] for follow-up      It has been a pleasure seeing Cayden Dewitt in follow up today.  He was somewhat of an anxious gentleman today and had  multiple, multiple questions.  Unfortunately he will not be able to be followed long-term in our system.  He is asking to see Dr. Collins back once more before the end of the year and I have scheduled that in 2 weeks time to follow-up on his blood pressure.  We will have the renal artery image performed before that time.    greater than 50% of this 1 hour visit today was spent in counseling     Laure Hughes, MSN, APRN-BC, CNP  Cardiology    No orders of the defined types were placed in this encounter.    No orders of the defined types were placed in this encounter.    There are no discontinued medications.      Encounter Diagnosis   Name Primary?     NSTEMI (non-ST elevated myocardial infarction) (H)        CURRENT MEDICATIONS:  Current Outpatient Prescriptions   Medication Sig Dispense Refill     amLODIPine (NORVASC) 5 MG tablet Take 1 tablet (5 mg) by mouth daily 90 tablet 3     aspirin EC 81 MG EC tablet Take 1 tablet (81 mg) by mouth daily       carvedilol (COREG) 6.25 MG tablet Take 1 tablet (6.25 mg) by mouth 2 times daily (with meals) 180 tablet 3     CLONIDINE HCL PO Take 0.1 mg by mouth 2 times daily        clopidogrel (PLAVIX) 75 MG tablet Take 1 tablet (75 mg) by mouth daily 90 tablet 3     metFORMIN (GLUCOPHAGE) 1000 MG tablet Take 1 tablet (1,000 mg) by mouth 2 times daily (with meals) 60 tablet 0     Multiple Vitamins-Minerals (CENTRUM SILVER) per tablet Take 1 tablet by mouth daily       nitroGLYcerin (NITROSTAT) 0.4 MG sublingual tablet For chest pain place 1 tablet under the tongue every 5 minutes for 3 doses. If symptoms persist 5 minutes after 1st dose call 911. 25 tablet 3     rosuvastatin (CRESTOR) 5 MG tablet One pill every other day and as directed 90 tablet 3       ALLERGIES     Allergies   Allergen Reactions     Ciprofloxacin      Contrast Dye      Facial and lip swelling     Iodine        PAST MEDICAL HISTORY:  Past Medical History:   Diagnosis Date     Ascending aorta dilatation  "(H)      Benign essential HTN      CAD (coronary artery disease)     11/29/17- ASA 3.5x16mm Promus to Ramux and ASA 2.06q78xs Promus to prox PDA     Hyperlipidemia      NSTEMI (non-ST elevated myocardial infarction) (H) 11/2017     90% lesion in the ramus stented with a 3.5 x 16 mm Promus Premier drug-eluting stent as well as a 90% PDA lesion stented with a 2.25 x 28 mm Promus Premier drug-eluting stent.LMA30%, 70% ostial Dl small vessel  treat medically. E2gnott 100% The LAD was 30% diseased.  LV and diastolic pressure was 20     Type 2 diabetes mellitus without complications (H)      Uncomplicated asthma        PAST SURGICAL HISTORY:  Past Surgical History:   Procedure Laterality Date     CORONARY ANGIOGRAPHY ADULT ORDER  11/29/2017 11/29/17- AAS to Ramus, ASA to PDA, Lma 30%, Lad-mild 30, [id number] 70% ostial, D2 small 100%, Lcx mild     GENITOURINARY SURGERY      prostate       FAMILY HISTORY:  Family History   Problem Relation Age of Onset     Alzheimer Disease Mother        SOCIAL HISTORY:  Social History     Social History     Marital status:      Spouse name: N/A     Number of children: N/A     Years of education: N/A     Social History Main Topics     Smoking status: Never Smoker     Smokeless tobacco: Never Used     Alcohol use No     Drug use: None     Sexual activity: Not Asked     Other Topics Concern     None     Social History Narrative       Review of Systems:  Skin:          Eyes:         ENT:         Respiratory:          Cardiovascular:         Gastroenterology:        Genitourinary:         Musculoskeletal:         Neurologic:         Psychiatric:         Heme/Lymph/Imm:         Endocrine:           Physical Exam:  Vitals: Ht 1.727 m (5' 8\")  Wt 89.7 kg (197 lb 12.8 oz)  BMI 30.08 kg/m2    Constitutional:           Skin:             Head:           Eyes:           Lymph:      ENT:           Neck:           Respiratory:            Cardiac:                                                  "          GI:           Extremities and Muscular Skeletal:                 Neurological:           Psych:         Recent Lab Results:  LIPID RESULTS:  Lab Results   Component Value Date    CHOL 107 02/15/2018    HDL 38 (L) 02/15/2018    LDL 45 02/15/2018    TRIG 119 02/15/2018       LIVER ENZYME RESULTS:  Lab Results   Component Value Date    AST 21 11/28/2017    ALT <5 (L) 02/15/2018       CBC RESULTS:  Lab Results   Component Value Date    WBC 15.2 (H) 11/30/2017    RBC 4.76 11/30/2017    HGB 14.3 11/30/2017    HCT 41.6 11/30/2017    MCV 87 11/30/2017    MCH 30.0 11/30/2017    MCHC 34.4 11/30/2017    RDW 13.4 11/30/2017     11/30/2017       BMP RESULTS:  Lab Results   Component Value Date     10/30/2018    POTASSIUM 4.1 10/30/2018    CHLORIDE 103 10/30/2018    CO2 29 10/30/2018    ANIONGAP 11.1 10/30/2018     (H) 10/30/2018    BUN 13 10/30/2018    CR 0.99 10/30/2018    GFRESTIMATED 72 10/30/2018    GFRESTBLACK 87 10/30/2018    MACIEJ 9.7 10/30/2018        A1C RESULTS:  Lab Results   Component Value Date    A1C 6.0 11/28/2017       INR RESULTS:  No results found for: INR        CC  Laure Hughes, APRN CNP  [address]  [place] [address]

## 2018-10-31 ENCOUNTER — DOCUMENTATION ONLY (OUTPATIENT)
Dept: CARDIOLOGY | Facility: CLINIC | Age: 83
End: 2018-10-31

## 2018-10-31 ENCOUNTER — CARE COORDINATION (OUTPATIENT)
Dept: CARDIOLOGY | Facility: CLINIC | Age: 83
End: 2018-10-31

## 2018-10-31 NOTE — PROGRESS NOTES
Can you call this patient  He is coming in for renal ultrasound on 11-8 at 8am    I need to see if someone can check his bp and call me to see if we need to adjust his meds more    Can you let me know if we can get that done after the ultrasound and let patient know please    Thanks  Laure

## 2018-10-31 NOTE — PROGRESS NOTES
Received call from pt requesting to know the number of the [facility] sleep center KALPANA Leone told him about at yesterdays OV, as pt would like to try and schedule sleep study prior to 1/1/19 when his insurance coverage will change.  Provided pt with [facility] number ([phone]). He states he will call [facility] to discuss further.    JEANNIE Sauer 11:08 AM 10/31/2018

## 2018-10-31 NOTE — TELEPHONE ENCOUNTER
Thanks so just need to be called with the bp when he comes in--I am rounding and may need to change meds    Laure

## 2018-10-31 NOTE — PROGRESS NOTES
Called and spoke with pt.  Reviewed KALPANA Leone's recommendation for BP recheck after US on 11/8 in order to evaluate if further medication adjustments should be made.  Pt verbalized understanding and agrees to nurse visit for BP check at 9:30am on 11/8.  Appt note states to have pt stay and review BP with [facility], also sent reminder to [facility] board to review plan with staff assigned to do BP check.      JEANNIE Sauer 4:02 PM 10/31/2018

## 2018-11-05 NOTE — PROGRESS NOTES
Called Pt after talking to DR Collins. Pt is to now be taking 12.5 mg coreg BID.  Did review this with Pt. He will have BP check 11/8/18. KATIA Bullard RN

## 2018-11-07 ENCOUNTER — TELEPHONE (OUTPATIENT)
Dept: CARDIOLOGY | Facility: CLINIC | Age: 83
End: 2018-11-07

## 2018-11-07 RX ORDER — CARVEDILOL 6.25 MG/1
12.5 TABLET ORAL 2 TIMES DAILY WITH MEALS
Qty: 60 TABLET | COMMUNITY
Start: 2018-11-07 | End: 2018-11-14

## 2018-11-07 NOTE — TELEPHONE ENCOUNTER
Questions regarding aortic aneurysm=5.0cm  Taking Coreg 12.5mg twice a day  Limit caffeine  Low salt diet  Needs BP control  Practice anxiety reducing practices-yoga, meditation

## 2018-11-08 ENCOUNTER — HOSPITAL ENCOUNTER (OUTPATIENT)
Dept: ULTRASOUND IMAGING | Facility: CLINIC | Age: 83
Discharge: HOME | End: 2018-11-08
Attending: NURSE PRACTITIONER | Admitting: NURSE PRACTITIONER
Payer: MEDICARE

## 2018-11-08 ENCOUNTER — ALLIED HEALTH/NURSE VISIT (OUTPATIENT)
Dept: CARDIOLOGY | Facility: CLINIC | Age: 83
End: 2018-11-08
Payer: COMMERCIAL

## 2018-11-08 VITALS — SYSTOLIC BLOOD PRESSURE: 120 MMHG | DIASTOLIC BLOOD PRESSURE: 78 MMHG | HEART RATE: 66 BPM

## 2018-11-08 DIAGNOSIS — I10 BENIGN ESSENTIAL HYPERTENSION: ICD-10-CM

## 2018-11-08 DIAGNOSIS — I10 BENIGN ESSENTIAL HTN: Primary | ICD-10-CM

## 2018-11-08 DIAGNOSIS — I21.4 NSTEMI (NON-ST ELEVATED MYOCARDIAL INFARCTION) (H): ICD-10-CM

## 2018-11-08 PROCEDURE — 99207 ZZC NO CHARGE LOS: CPT | Performed by: NURSE PRACTITIONER

## 2018-11-08 PROCEDURE — 76770 US EXAM ABDO BACK WALL COMP: CPT | Mod: 59

## 2018-11-08 PROCEDURE — 93975 VASCULAR STUDY: CPT | Mod: 26 | Performed by: INTERNAL MEDICINE

## 2018-11-08 PROCEDURE — 76770 US EXAM ABDO BACK WALL COMP: CPT | Mod: 26 | Performed by: INTERNAL MEDICINE

## 2018-11-08 RX ORDER — HYDRALAZINE HYDROCHLORIDE 10 MG/1
10 TABLET, FILM COATED ORAL 3 TIMES DAILY
Qty: 90 TABLET | Refills: 3 | COMMUNITY
Start: 2018-11-08 | End: 2018-11-09

## 2018-11-08 NOTE — PATIENT INSTRUCTIONS
-Take clonidine twice daily: today and tomorrow.    -Take clonidine once daily: Saturday and Sunday.  -Do not take clonidine starting Monday.     If your systolic blood pressure goes above 130 over the weekend, increase hydralazine to 20mg three times daily.      Keep appointment with Dr. Collins on Monday.

## 2018-11-08 NOTE — NURSING NOTE
Reviewed pt's BP and HR today with KALPANA Leone who recommends pt:  -Take clonidine twice daily: today and tomorrow.    -Take clonidine once daily: Saturday and Sunday.  -Do not take clonidine starting Monday.   If systolic blood pressure goes above 130 over the weekend, increase hydralazine to 20mg three times daily.      Med list updated in Epic.     Above information added to patient instructions section of AVS, reviewed all instructions with pt who verbalized understanding and agrees with this plan.  Advised pt to call with any questions or concerns, number provided.    JEANNIE Sauer 9:53 AM 11/8/2018

## 2018-11-08 NOTE — MR AVS SNAPSHOT
After Visit Summary   11/8/2018    Cayden Dewitt    MRN: [id number]           Patient Information     Date Of Birth          1935        Visit Information        Provider Department      11/8/2018 9:30 AM [facility]        Today's Diagnoses     Benign essential HTN    -  1      Care Instructions    -Take clonidine twice daily: today and tomorrow.    -Take clonidine once daily: Saturday and Sunday.  -Do not take clonidine starting Monday.     If your systolic blood pressure goes above 130 over the weekend, increase hydralazine to 20mg three times daily.      Keep appointment with Dr. Collins on Monday.            Follow-ups after your visit        Your next 10 appointments already scheduled     Nov 08, 2018  9:30 AM CST   Nurse Only with [facility]   [facility] ([facility])    [address]  [place] [facility] [address]   [phone] OPT 2            Nov 12, 2018  8:45 AM CST   Return Visit with Magdy Collins MD   [facility] ([facility])    [address]  [place] [address]   [phone] OPT 2            Nov 15, 2018 10:00 AM CST   New Sleep Patient with Bennett Ezra Goltz, PA-C   [facility] ([facility])    [address]  [place] [address] [phone]              Who to contact     If you have questions or need follow up information about today's clinic visit or your schedule please contact [facility] directly at [phone].  Normal or non-critical lab and imaging results will be communicated to you by MyChart, letter or phone within 4 business days after the clinic has received the results. If you do not hear from us within 7 days, please contact the clinic through MyChart or phone.  If you have a critical or abnormal lab result, we will notify you by phone as soon as possible.  Submit refill requests through [employer] or call your pharmacy and they will forward the refill request to us. Please allow 3 business days for your refill to be completed.          Additional Information About Your Visit        Care EveryWhere ID     This is your Care EveryWhere ID. This could be used by other organizations to access your [facility] medical records  [id number]        Your Vitals Were     Pulse                   66            Blood Pressure from Last 3 Encounters:   11/08/18 120/78   10/30/18 (!) 168/98   08/28/18 140/80    Weight from Last 3 Encounters:   10/30/18 89.7 kg (197 lb 12.8 oz)   08/28/18 91.2 kg (201 lb)   02/22/18 93.4 kg (206 lb)              Today, you had the following     No orders found for display       Primary Care Provider Office Phone # Fax #    Mpho Walt Guillory [phone]       [facility] [address]  [facility] [address]        Equal Access to Services     [facility]: Hadii aad ku hadasho Soomaali, waaxda luqadaha, qaybta kaalmada adeegyada, esther marie . So [facility] [phone].    ATENCIÓN: Si habla español, tiene a jasmine disposición servicios gratuitos de asistencia lingüística. [facility] [phone].    We comply with applicable federal civil rights laws and [place] laws. We do not discriminate on the basis of race, color, national origin, age, disability, sex, sexual orientation, or gender identity.            Thank you!     Thank you for choosing [facility]  for your care. Our goal is always to provide you with excellent care. Hearing back from our patients is one way we can continue to improve our services. Please take a few minutes to complete the written survey that you may receive in the mail after your visit with us. Thank you!             Your Updated  Medication List - Protect others around you: Learn how to safely use, store and throw away your medicines at www.disposemymeds.org.          This list is accurate as of 11/8/18  9:08 AM.  Always use your most recent med list.                   Brand Name Dispense Instructions for use Diagnosis    amLODIPine 5 MG tablet    NORVASC    90 tablet    Take 1 tablet (5 mg) by mouth daily    NSTEMI (non-ST elevated myocardial infarction) (H)       aspirin 81 MG EC tablet      Take 1 tablet (81 mg) by mouth daily    NSTEMI (non-ST elevated myocardial infarction) (H)       carvedilol 6.25 MG tablet    COREG    60 tablet    Take 2 tablets (12.5 mg) by mouth 2 times daily (with meals)        CENTRUM SILVER per tablet      Take 1 tablet by mouth daily        CLONIDINE HCL PO      Take 0.05 mg by mouth 3 times daily        clopidogrel 75 MG tablet    PLAVIX    90 tablet    Take 1 tablet (75 mg) by mouth daily    NSTEMI (non-ST elevated myocardial infarction) (H)       hydrALAZINE 10 MG tablet    APRESOLINE    90 tablet    Take 1 tablet (10 mg) by mouth 3 times daily    Benign essential hypertension       losartan 100 MG tablet    COZAAR    90 tablet    Take 1 tablet (100 mg) by mouth daily    Benign essential hypertension       metFORMIN 1000 MG tablet    GLUCOPHAGE    60 tablet    Take 1 tablet (1,000 mg) by mouth 2 times daily (with meals)    Type 2 diabetes mellitus with hyperglycemia, without long-term current use of insulin (H)       nitroGLYcerin 0.4 MG sublingual tablet    NITROSTAT    25 tablet    For chest pain place 1 tablet under the tongue every 5 minutes for 3 doses. If symptoms persist 5 minutes after 1st dose call 911.    NSTEMI (non-ST elevated myocardial infarction) (H)       rosuvastatin 5 MG tablet    CRESTOR    90 tablet    One pill every other day and as directed    Mixed hyperlipidemia

## 2018-11-09 ENCOUNTER — CARE COORDINATION (OUTPATIENT)
Dept: CARDIOLOGY | Facility: CLINIC | Age: 83
End: 2018-11-09

## 2018-11-09 DIAGNOSIS — I10 BENIGN ESSENTIAL HYPERTENSION: ICD-10-CM

## 2018-11-09 RX ORDER — HYDRALAZINE HYDROCHLORIDE 10 MG/1
10 TABLET, FILM COATED ORAL 3 TIMES DAILY
Qty: 405 TABLET | Refills: 3 | Status: SHIPPED | OUTPATIENT
Start: 2018-11-09 | End: 2018-11-12

## 2018-11-09 NOTE — PROGRESS NOTES
Incoming call from patient, reviewed renal US results per DTK:  Notes Recorded by Laure Hughes, THOMAS CNP on 11/8/2018 at 3:29 PM  Let him know he has no significant blockages in his kidney arteries to explain his high blood pressure  Follow up with Alfonso as planned  Kidney stone is known about    Patient verbalized understanding and confirmed appt Monday w/Dr Ross. He then stated that he needs updated Rx for hydralazine as instructions adjusted per DTK on 11/8/18:  If systolic blood pressure goes above 130 over the weekend, increase hydralazine to 20mg three times daily.      Med list updated, will send Rx to his preferred pharmacy.  Azucena Mac RN on 11/9/2018 at 11:38 AM

## 2018-11-12 ENCOUNTER — OFFICE VISIT (OUTPATIENT)
Dept: CARDIOLOGY | Facility: CLINIC | Age: 83
End: 2018-11-12
Attending: NURSE PRACTITIONER
Payer: COMMERCIAL

## 2018-11-12 VITALS
BODY MASS INDEX: 30.01 KG/M2 | HEIGHT: 68 IN | WEIGHT: 198 LBS | HEART RATE: 76 BPM | DIASTOLIC BLOOD PRESSURE: 85 MMHG | SYSTOLIC BLOOD PRESSURE: 151 MMHG

## 2018-11-12 DIAGNOSIS — I10 BENIGN ESSENTIAL HTN: Primary | ICD-10-CM

## 2018-11-12 DIAGNOSIS — I25.10 CORONARY ARTERY DISEASE INVOLVING NATIVE CORONARY ARTERY OF NATIVE HEART WITHOUT ANGINA PECTORIS: ICD-10-CM

## 2018-11-12 DIAGNOSIS — I10 BENIGN ESSENTIAL HYPERTENSION: ICD-10-CM

## 2018-11-12 DIAGNOSIS — E78.2 MIXED HYPERLIPIDEMIA: ICD-10-CM

## 2018-11-12 PROCEDURE — 99215 OFFICE O/P EST HI 40 MIN: CPT | Performed by: INTERNAL MEDICINE

## 2018-11-12 RX ORDER — CARVEDILOL 6.25 MG/1
TABLET ORAL
Qty: 270 TABLET | Refills: 1 | Status: SHIPPED | OUTPATIENT
Start: 2018-11-12 | End: 2018-11-12

## 2018-11-12 RX ORDER — CLONIDINE HYDROCHLORIDE 0.1 MG/1
TABLET ORAL
Qty: 30 TABLET | Refills: 1 | Status: ON HOLD | OUTPATIENT
Start: 2018-11-12 | End: 2020-01-03

## 2018-11-12 RX ORDER — ROSUVASTATIN CALCIUM 5 MG/1
2.5 TABLET, COATED ORAL EVERY OTHER DAY
Qty: 90 TABLET | Refills: 3 | COMMUNITY
Start: 2018-11-12 | End: 2018-11-14

## 2018-11-12 RX ORDER — HYDRALAZINE HYDROCHLORIDE 25 MG/1
25 TABLET, FILM COATED ORAL 3 TIMES DAILY
Qty: 90 TABLET | Refills: 11 | Status: SHIPPED | OUTPATIENT
Start: 2018-11-12

## 2018-11-12 NOTE — PROGRESS NOTES
Service Date: 2018      Jericho Guillory MD   [facility] [place] [address] [place] [address]      RE: Cayden Soto    MRN: [id number]   : 1935      Dear Dr. Guillory:       I had the pleasure of following up on our mutual patient Cayden Soto.  Today's visit was literally 45 minutes, all counseling.  This patient is going to be switching because of his health insurance to probably [facility], so I do not know if either of us is going to see him anymore, and he is a very, very sweet man, but very confused and very nervous.  Part of the issue is that we are taking care of him, you are seeing him, and your partner Dr. Alexander has seen him.  Dr. Alexander has been excellent.  He has called me to double check on care numerous times, but as a result, the patient has become even more confused.  Currently, I have no idea what medications he is on, and the patient does not, either, because there have been a number of changes.  I finally called his pharmacy benefits people today and scheduled a phone call to him in the next hour or two to go through the medications.  I have put in our Epic system what I think the medicines he should be on, but we again have no idea.        Since he is going to be switching services, this letter will also serve as an introduction to his new cardiologist.  He is a very pleasant, but very worried gentleman.  He presented with a non-STEMI a year ago.  His heart cath performed by my partner showed the left main had a 30% narrowing.  The LAD had diffuse 30%-40% narrowing.  D1 was a very small artery and had a 60% ostial narrowing.  D2 was a small vessel and occluded and collateralized.  The left circumflex had minimal irregularity.  The right coronary artery was a large dominant vessel, and it had 90% proximal PDA.  The ramus was a large branch vessel with a 90% narrowing.  Drug-eluting stents:  A 3.5 x 16 Promus was placed to the ramus,  and a 2.25 x 28 Promus was placed to the PDA with good results.  At the time of his angiogram, they reported an ejection fraction of 55% with some hypokinesis along the anterolateral and lateral wall most consistent with the ramus branch.  He had an echocardiogram, also, a few months later that reports an ejection fraction of 54%, normal wall motion, mild AI, aortic sclerosis without stenosis, right ventricle normal.  Mitral valve has some annular calcification, but the valve is functional.  The tricuspid valve is normal, and there is no pulmonary hypertension.  Importantly, though, they report the sinus of Valsalva measuring 40 mm, and the ascending aorta is normal.  This is critically important because the patient told me that either you or Dr. Alexander ordered a CAT scan because of lung nodules seen, and he went to some outside radiology structure that we do not know where it was, but not in the [employer] system, and he got a call that his aorta was 50 mm.  The patient asked me numerous questions about that, and I felt uncomfortable answering since I cannot see the CAT scan, and I do not know what they are referring to.  If indeed the aorta really was 40 mm at the sinus of Valsalva this past year, and now it is 50, then he will need aorta surgery.  I cannot make any further comments until I know if it really increased this much in one year or not.  This is why I think it is imperative that this patient finally get one health system and one main doctor.  He is going to be switching his insurance plan, so I am going to hope that the [facility] will be able to take over that task.  From a cardiac standpoint, he reports absolutely no angina, no dizziness.  He is doing well.  His blood pressure is running upper normal.  Again, it is hard for me to clarify what I should do with his medicines because I do not know what they are.  Dr. Alexander placed him on clonidine, and I suggested that that does not usually  work well in older people, and indeed he got the classic side effect of fatigue, so he was slowly weaned off of it.  What I think he should be on, but again I do not know what he is on now, will eventually be hydralazine 25 mg t.i.d., carvedilol 12.5 b.i.d., clonidine 0.1 mg only once a day p.r.n. systolic blood pressure greater than 160.  He is a very nervous man.  He checks his blood pressure a lot, and he gets very worried when he sees high numbers, which probably makes the blood pressure go even higher.  He stopped his Crestor because of muscle ache, but he has a significant lipid abnormality.  I am going to have him try Crestor 2.5 every other day.  The Plavix will end when the current bottle is empty because this is the one year catia.  The Cozaar we think is 100 mg a day, but it might be 50; we do not know.  The Glucophage I will leave to you to comment on.        I have not scheduled any further followup appointments because he is going to be switching health systems within the next month or so.  The pharmacy benefits person will be calling him today.  I appreciate any of your input.  Again, I think the imperative things are to make sure we know what blood pressure pills he is on to make sure his blood pressure and pulse are adequate and then follow up on this disturbing CAT scan, which may or may not show significant enlargement, within a year.  That will probably be tasked to his new health system.  Again, I handwrote directions.  I printed an after-visit summary.  I talked to the patient point by point.  He was clearly confused.  Also, somewhere along the line, he told me that my nurse told him that the CPAP machine will not be so hard on his heart, and I said it is the other way around.  Untreated sleep apnea is hard on the heart, so he had a whole list of questions about sleep apnea.  Again, I do not have any of those tests to know how much sleep apnea he has or if he was prescribed a CPAP.  Then he  asked about salt; I did my best to talk about it.  Then he talked about an exercise program.  We talked about getting on a bike because he has arthritis in his knee, and he is not stable walking.  I really covered a lot of subjects here today, but I think this patient would be best served by having a family member show up to future visits.  He is clearly not able to follow the discussions here, but I did my best that I could in terms of handwriting directions, printing directions and calling the pharmacy  to talk to him today and asked him to follow up with either you or Dr. Alexander again.  I again want to compliment Dr. Alexander.  He is a very caring gentleman.  Even though I have xxxxxxxxxxxxxxxxxx met him, he has talked to me numerous times, and we have tried to work out a plan over the phone, but I think the patient is not able to follow phone directions.      Sincerely,      MD LU Mercer MD             D: 2018   T: 2018   MT: marko      Name:     ABIGAIL FAGAN   MRN:      [id number]        Account:      [id number]   :      1935           Service Date: 2018      Document: [id number]

## 2018-11-12 NOTE — PROGRESS NOTES
HPI and Plan:   See dictation    No orders of the defined types were placed in this encounter.    Orders Placed This Encounter   Medications     rosuvastatin (CRESTOR) 5 MG tablet     Sig: Take 0.5 tablets (2.5 mg) by mouth every other day     Dispense:  90 tablet     Refill:  3     DISCONTD: carvedilol (COREG) 6.25 MG tablet     Si tabs (12.5mg) in AM, 1 tab 6.25mg in PM     Dispense:  270 tablet     Refill:  1     cloNIDine (CATAPRES) 0.1 MG tablet     Sig: Take 1 tablet (0.1mg) a day if systolic blood pressure > 160mmHg     Dispense:  30 tablet     Refill:  1     hydrALAZINE (APRESOLINE) 25 MG tablet     Sig: Take 1 tablet (25 mg) by mouth 3 times daily     Dispense:  90 tablet     Refill:  11     Medications Discontinued During This Encounter   Medication Reason     carvedilol (COREG) 6.25 MG tablet      hydrALAZINE (APRESOLINE) 10 MG tablet Reorder         Encounter Diagnoses   Name Primary?     Benign essential hypertension      Benign essential HTN Yes     Mixed hyperlipidemia      Coronary artery disease involving native coronary artery of native heart without angina pectoris        CURRENT MEDICATIONS:  Current Outpatient Prescriptions   Medication Sig Dispense Refill     amLODIPine (NORVASC) 5 MG tablet Take 1 tablet (5 mg) by mouth daily 90 tablet 3     aspirin EC 81 MG EC tablet Take 1 tablet (81 mg) by mouth daily       carvedilol (COREG) 6.25 MG tablet Take 2 tablets (12.5 mg) by mouth 2 times daily (with meals) 60 tablet      cloNIDine (CATAPRES) 0.1 MG tablet Take 1 tablet (0.1mg) a day if systolic blood pressure > 160mmHg 30 tablet 1     clopidogrel (PLAVIX) 75 MG tablet Take 1 tablet (75 mg) by mouth daily 90 tablet 3     hydrALAZINE (APRESOLINE) 25 MG tablet Take 1 tablet (25 mg) by mouth 3 times daily 90 tablet 11     losartan (COZAAR) 100 MG tablet Take 1 tablet (100 mg) by mouth daily 90 tablet 3     metFORMIN (GLUCOPHAGE) 1000 MG tablet Take 1 tablet (1,000 mg) by mouth 2 times daily (with  meals) 60 tablet 0     Multiple Vitamins-Minerals (CENTRUM SILVER) per tablet Take 1 tablet by mouth daily       nitroGLYcerin (NITROSTAT) 0.4 MG sublingual tablet For chest pain place 1 tablet under the tongue every 5 minutes for 3 doses. If symptoms persist 5 minutes after 1st dose call 911. 25 tablet 3     rosuvastatin (CRESTOR) 5 MG tablet Take 0.5 tablets (2.5 mg) by mouth every other day 90 tablet 3     CLONIDINE HCL PO Take 0.05 mg by mouth 3 times daily Take 0.05mg twice welsh 11/8 and 11/9, then take 0.05mg once daily on 11/10 and 11/11, then stop taking on 11/12.         rosuvastatin (CRESTOR) 5 MG tablet One pill every other day and as directed (Patient not taking: Reported on 11/12/2018) 90 tablet 3     [DISCONTINUED] carvedilol (COREG) 6.25 MG tablet 2 tabs (12.5mg) in AM, 1 tab 6.25mg in  tablet 1     [DISCONTINUED] hydrALAZINE (APRESOLINE) 10 MG tablet Take 1 tablet (10 mg) by mouth 3 times daily If SBP >130, increase dose to 20mg ( 2 tabs) three times daily 405 tablet 3       ALLERGIES     Allergies   Allergen Reactions     Ciprofloxacin      Contrast Dye      Facial and lip swelling     Iodine        PAST MEDICAL HISTORY:  Past Medical History:   Diagnosis Date     Ascending aorta dilatation (H)      Benign essential HTN      CAD (coronary artery disease)     11/29/17- ASA 3.5x16mm Promus to Ramux and ASA 2.00c05lf Promus to prox PDA     Hyperlipidemia      NSTEMI (non-ST elevated myocardial infarction) (H) 11/2017     90% lesion in the ramus stented with a 3.5 x 16 mm Promus Premier drug-eluting stent as well as a 90% PDA lesion stented with a 2.25 x 28 mm Promus Premier drug-eluting stent.LMA30%, 70% ostial Dl small vessel  treat medically. P7runfo 100% The LAD was 30% diseased.  LV and diastolic pressure was 20     Type 2 diabetes mellitus without complications (H)      Uncomplicated asthma        PAST SURGICAL HISTORY:  Past Surgical History:   Procedure Laterality Date     CORONARY  "ANGIOGRAPHY ADULT ORDER  11/29/2017 11/29/17- ASA to Ramus, ASA to PDA, Lma 30%, Lad-mild 30, Z2qtzxd 70% ostial, D2 small 100%, Lcx mild     GENITOURINARY SURGERY      prostate       FAMILY HISTORY:  Family History   Problem Relation Age of Onset     Alzheimer Disease Mother        SOCIAL HISTORY:  Social History     Social History     Marital status:      Spouse name: N/A     Number of children: N/A     Years of education: N/A     Social History Main Topics     Smoking status: Never Smoker     Smokeless tobacco: Never Used     Alcohol use No     Drug use: None     Sexual activity: Not Asked     Other Topics Concern     None     Social History Narrative       Review of Systems:  Skin:  Positive for bruising   Eyes:  Positive for glasses  ENT:  Positive for nasal congestion;sinus trouble  Respiratory:  Positive for wheezing (due to poor air quality this summer)  Cardiovascular:  Negative Positive for;exercise intolerance;edema;fatigue  Gastroenterology: Negative    Genitourinary:  Positive for prostate problem  Musculoskeletal:  Positive for joint pain;arthritis (right knee)  Neurologic:  Positive for headaches  Psychiatric:  Positive for anxiety  Heme/Lymph/Imm:  Positive for easy bruising  Endocrine:  Positive for diabetes    Physical Exam:  Vitals: /85  Pulse 76  Ht 1.727 m (5' 7.99\")  Wt 89.8 kg (198 lb)  BMI 30.11 kg/m2    Constitutional:           Skin:             Head:           Eyes:           Lymph:      ENT:           Neck:           Respiratory:            Cardiac:                                                           GI:           Extremities and Muscular Skeletal:                 Neurological:           Psych:         Recent Lab Results:  LIPID RESULTS:  Lab Results   Component Value Date    CHOL 110 10/30/2018    HDL 36 (L) 10/30/2018    LDL 41 10/30/2018    TRIG 163 (H) 10/30/2018       LIVER ENZYME RESULTS:  Lab Results   Component Value Date    AST 21 11/28/2017    ALT 24 " 10/30/2018       CBC RESULTS:  Lab Results   Component Value Date    WBC 15.2 (H) 11/30/2017    RBC 4.76 11/30/2017    HGB 14.3 11/30/2017    HCT 41.6 11/30/2017    MCV 87 11/30/2017    MCH 30.0 11/30/2017    MCHC 34.4 11/30/2017    RDW 13.4 11/30/2017     11/30/2017       BMP RESULTS:  Lab Results   Component Value Date     10/30/2018    POTASSIUM 4.1 10/30/2018    CHLORIDE 103 10/30/2018    CO2 29 10/30/2018    ANIONGAP 11.1 10/30/2018     (H) 10/30/2018    BUN 13 10/30/2018    CR 0.99 10/30/2018    GFRESTIMATED 72 10/30/2018    GFRESTBLACK 87 10/30/2018    MACIEJ 9.7 10/30/2018        A1C RESULTS:  Lab Results   Component Value Date    A1C 6.0 11/28/2017       INR RESULTS:  No results found for: INR        CC  Laure Hughes, APRN CNP  [address]  [place] [address]

## 2018-11-12 NOTE — MR AVS SNAPSHOT
"              After Visit Summary   11/12/2018    Cayden Dewitt    MRN: [id number]           Patient Information     Date Of Birth          1935        Visit Information        Provider Department      11/12/2018 8:45 AM Magdy Collins MD [facility]        Today's Diagnoses     Benign essential HTN    -  1    Benign essential hypertension        Mixed hyperlipidemia        Coronary artery disease involving native coronary artery of native heart without angina pectoris           Follow-ups after your visit        Your next 10 appointments already scheduled     Nov 15, 2018 10:00 AM CST   New Sleep Patient with Bennett Ezra Goltz, PA-C   [facility] ([facility])    [address] [address] [phone]              Who to contact     If you have questions or need follow up information about today's clinic visit or your schedule please contact [facility] directly at [phone].  Normal or non-critical lab and imaging results will be communicated to you by MyChart, letter or phone within 4 business days after the clinic has received the results. If you do not hear from us within 7 days, please contact the clinic through MyChart or phone. If you have a critical or abnormal lab result, we will notify you by phone as soon as possible.  Submit refill requests through [employer] or call your pharmacy and they will forward the refill request to us. Please allow 3 business days for your refill to be completed.          Additional Information About Your Visit        Care EveryWhere ID     This is your Care EveryWhere ID. This could be used by other organizations to access your [place] medical records  [id number]        Your Vitals Were     Pulse Height BMI (Body Mass Index)             76 1.727 m (5' 7.99\") 30.11 kg/m2          Blood Pressure from Last 3 Encounters: "   11/12/18 151/85   11/08/18 120/78   10/30/18 (!) 168/98    Weight from Last 3 Encounters:   11/12/18 89.8 kg (198 lb)   10/30/18 89.7 kg (197 lb 12.8 oz)   08/28/18 91.2 kg (201 lb)              We Performed the Following     Follow-Up with Cardiologist          Today's Medication Changes          These changes are accurate as of 11/12/18  9:45 AM.  If you have any questions, ask your nurse or doctor.               These medicines have changed or have updated prescriptions.        Dose/Directions    * CLONIDINE HCL PO   This may have changed:  Another medication with the same name was added. Make sure you understand how and when to take each.   Changed by:  Magdy Collins MD        Dose:  0.05 mg   Take 0.05 mg by mouth 3 times daily Take 0.05mg twice welsh 11/8 and 11/9, then take 0.05mg once daily on 11/10 and 11/11, then stop taking on 11/12.   Refills:  0       * cloNIDine 0.1 MG tablet   Commonly known as:  CATAPRES   This may have changed:  You were already taking a medication with the same name, and this prescription was added. Make sure you understand how and when to take each.   Used for:  Benign essential hypertension   Changed by:  Magdy Collins MD        Take 1 tablet (0.1mg) a day if systolic blood pressure > 160mmHg   Quantity:  30 tablet   Refills:  1       hydrALAZINE 25 MG tablet   Commonly known as:  APRESOLINE   This may have changed:    - medication strength  - how much to take  - additional instructions   Used for:  Benign essential hypertension   Changed by:  Magdy Collins MD        Dose:  25 mg   Take 1 tablet (25 mg) by mouth 3 times daily   Quantity:  90 tablet   Refills:  11       * Notice:  This list has 2 medication(s) that are the same as other medications prescribed for you. Read the directions carefully, and ask your doctor or other care provider to review them with you.         Where to get your medicines      These medications were sent to [facility]  [place], [place] - [address]  [address], [facility] [address]     Phone:  [phone]     cloNIDine 0.1 MG tablet    hydrALAZINE 25 MG tablet                Primary Care Provider Office Phone # Fax #    Mpho Walt Guillory [phone]       [facility] [address]  [facility] [address]        Equal Access to Services     MÓNICA GREENE : Hadii aad ku hadasho Soomaali, waaxda luqadaha, qaybta kaalmada adeegyada, waxay idiin hayaan adeeg kharash la'aan . So [facility] [phone].    ATENCIÓN: Si habla español, tiene a jasmine disposición servicios gratuitos de asistencia lingüística. [facility] [phone].    We comply with applicable federal civil rights laws and [place] laws. We do not discriminate on the basis of race, color, national origin, age, disability, sex, sexual orientation, or gender identity.            Thank you!     Thank you for choosing [facility]  for your care. Our goal is always to provide you with excellent care. Hearing back from our patients is one way we can continue to improve our services. Please take a few minutes to complete the written survey that you may receive in the mail after your visit with us. Thank you!             Your Updated Medication List - Protect others around you: Learn how to safely use, store and throw away your medicines at www.disposemymeds.org.          This list is accurate as of 11/12/18  9:45 AM.  Always use your most recent med list.                   Brand Name Dispense Instructions for use Diagnosis    amLODIPine 5 MG tablet    NORVASC    90 tablet    Take 1 tablet (5 mg) by mouth daily    NSTEMI (non-ST elevated myocardial infarction) (H)       aspirin 81 MG EC tablet      Take 1 tablet (81 mg) by mouth daily    NSTEMI (non-ST elevated myocardial infarction) (H)       carvedilol 6.25 MG tablet    COREG    60 tablet    Take 2 tablets (12.5 mg) by mouth 2 times daily (with  meals)        CENTRUM SILVER per tablet      Take 1 tablet by mouth daily        * CLONIDINE HCL PO      Take 0.05 mg by mouth 3 times daily Take 0.05mg twice welsh 11/8 and 11/9, then take 0.05mg once daily on 11/10 and 11/11, then stop taking on 11/12.        * cloNIDine 0.1 MG tablet    CATAPRES    30 tablet    Take 1 tablet (0.1mg) a day if systolic blood pressure > 160mmHg    Benign essential hypertension       clopidogrel 75 MG tablet    PLAVIX    90 tablet    Take 1 tablet (75 mg) by mouth daily    NSTEMI (non-ST elevated myocardial infarction) (H)       hydrALAZINE 25 MG tablet    APRESOLINE    90 tablet    Take 1 tablet (25 mg) by mouth 3 times daily    Benign essential hypertension       losartan 100 MG tablet    COZAAR    90 tablet    Take 1 tablet (100 mg) by mouth daily    Benign essential hypertension       metFORMIN 1000 MG tablet    GLUCOPHAGE    60 tablet    Take 1 tablet (1,000 mg) by mouth 2 times daily (with meals)    Type 2 diabetes mellitus with hyperglycemia, without long-term current use of insulin (H)       nitroGLYcerin 0.4 MG sublingual tablet    NITROSTAT    25 tablet    For chest pain place 1 tablet under the tongue every 5 minutes for 3 doses. If symptoms persist 5 minutes after 1st dose call 911.    NSTEMI (non-ST elevated myocardial infarction) (H)       * rosuvastatin 5 MG tablet    CRESTOR    90 tablet    One pill every other day and as directed    Mixed hyperlipidemia       * CRESTOR 5 MG tablet   Generic drug:  rosuvastatin     90 tablet    Take 0.5 tablets (2.5 mg) by mouth every other day        * Notice:  This list has 4 medication(s) that are the same as other medications prescribed for you. Read the directions carefully, and ask your doctor or other care provider to review them with you.

## 2018-11-12 NOTE — LETTER
2018    Mpho Walt Guillory MD  [facility] [address]  [facility] [address]    RE: Cayden Dewitt       Dear Colleague,    I had the pleasure of seeing Cayden Dewitt in the [facility].    HPI and Plan:   See dictation    No orders of the defined types were placed in this encounter.    Orders Placed This Encounter   Medications     rosuvastatin (CRESTOR) 5 MG tablet     Sig: Take 0.5 tablets (2.5 mg) by mouth every other day     Dispense:  90 tablet     Refill:  3     DISCONTD: carvedilol (COREG) 6.25 MG tablet     Si tabs (12.5mg) in AM, 1 tab 6.25mg in PM     Dispense:  270 tablet     Refill:  1     cloNIDine (CATAPRES) 0.1 MG tablet     Sig: Take 1 tablet (0.1mg) a day if systolic blood pressure > 160mmHg     Dispense:  30 tablet     Refill:  1     hydrALAZINE (APRESOLINE) 25 MG tablet     Sig: Take 1 tablet (25 mg) by mouth 3 times daily     Dispense:  90 tablet     Refill:  11     Medications Discontinued During This Encounter   Medication Reason     carvedilol (COREG) 6.25 MG tablet      hydrALAZINE (APRESOLINE) 10 MG tablet Reorder         Encounter Diagnoses   Name Primary?     Benign essential hypertension      Benign essential HTN Yes     Mixed hyperlipidemia      Coronary artery disease involving native coronary artery of native heart without angina pectoris        CURRENT MEDICATIONS:  Current Outpatient Prescriptions   Medication Sig Dispense Refill     amLODIPine (NORVASC) 5 MG tablet Take 1 tablet (5 mg) by mouth daily 90 tablet 3     aspirin EC 81 MG EC tablet Take 1 tablet (81 mg) by mouth daily       carvedilol (COREG) 6.25 MG tablet Take 2 tablets (12.5 mg) by mouth 2 times daily (with meals) 60 tablet      cloNIDine (CATAPRES) 0.1 MG tablet Take 1 tablet (0.1mg) a day if systolic blood pressure > 160mmHg 30 tablet 1     clopidogrel (PLAVIX) 75 MG tablet Take 1 tablet (75 mg) by mouth daily 90 tablet 3     hydrALAZINE  (APRESOLINE) 25 MG tablet Take 1 tablet (25 mg) by mouth 3 times daily 90 tablet 11     losartan (COZAAR) 100 MG tablet Take 1 tablet (100 mg) by mouth daily 90 tablet 3     metFORMIN (GLUCOPHAGE) 1000 MG tablet Take 1 tablet (1,000 mg) by mouth 2 times daily (with meals) 60 tablet 0     Multiple Vitamins-Minerals (CENTRUM SILVER) per tablet Take 1 tablet by mouth daily       nitroGLYcerin (NITROSTAT) 0.4 MG sublingual tablet For chest pain place 1 tablet under the tongue every 5 minutes for 3 doses. If symptoms persist 5 minutes after 1st dose call 911. 25 tablet 3     rosuvastatin (CRESTOR) 5 MG tablet Take 0.5 tablets (2.5 mg) by mouth every other day 90 tablet 3     CLONIDINE HCL PO Take 0.05 mg by mouth 3 times daily Take 0.05mg twice welsh 11/8 and 11/9, then take 0.05mg once daily on 11/10 and 11/11, then stop taking on 11/12.         rosuvastatin (CRESTOR) 5 MG tablet One pill every other day and as directed (Patient not taking: Reported on 11/12/2018) 90 tablet 3     [DISCONTINUED] carvedilol (COREG) 6.25 MG tablet 2 tabs (12.5mg) in AM, 1 tab 6.25mg in  tablet 1     [DISCONTINUED] hydrALAZINE (APRESOLINE) 10 MG tablet Take 1 tablet (10 mg) by mouth 3 times daily If SBP >130, increase dose to 20mg ( 2 tabs) three times daily 405 tablet 3       ALLERGIES     Allergies   Allergen Reactions     Ciprofloxacin      Contrast Dye      Facial and lip swelling     Iodine        PAST MEDICAL HISTORY:  Past Medical History:   Diagnosis Date     Ascending aorta dilatation (H)      Benign essential HTN      CAD (coronary artery disease)     11/29/17- ASA 3.5x16mm Promus to Ramux and ASA 2.28j38ln Promus to prox PDA     Hyperlipidemia      NSTEMI (non-ST elevated myocardial infarction) (H) 11/2017     90% lesion in the ramus stented with a 3.5 x 16 mm Promus Premier drug-eluting stent as well as a 90% PDA lesion stented with a 2.25 x 28 mm Promus Premier drug-eluting stent.LMA30%, 70% ostial Dl small vessel  treat  "medically. W3ftqdf 100% The LAD was 30% diseased.  LV and diastolic pressure was 20     Type 2 diabetes mellitus without complications (H)      Uncomplicated asthma        PAST SURGICAL HISTORY:  Past Surgical History:   Procedure Laterality Date     CORONARY ANGIOGRAPHY ADULT ORDER  11/29/2017 11/29/17- ASA to Ramus, ASA to PDA, Lma 30%, Lad-mild 30, B7hxasa 70% ostial, D2 small 100%, Lcx mild     GENITOURINARY SURGERY      prostate       FAMILY HISTORY:  Family History   Problem Relation Age of Onset     Alzheimer Disease Mother        SOCIAL HISTORY:  Social History     Social History     Marital status:      Spouse name: N/A     Number of children: N/A     Years of education: N/A     Social History Main Topics     Smoking status: Never Smoker     Smokeless tobacco: Never Used     Alcohol use No     Drug use: None     Sexual activity: Not Asked     Other Topics Concern     None     Social History Narrative       Review of Systems:  Skin:  Positive for bruising   Eyes:  Positive for glasses  ENT:  Positive for nasal congestion;sinus trouble  Respiratory:  Positive for wheezing (due to poor air quality this summer)  Cardiovascular:  Negative Positive for;exercise intolerance;edema;fatigue  Gastroenterology: Negative    Genitourinary:  Positive for prostate problem  Musculoskeletal:  Positive for joint pain;arthritis (right knee)  Neurologic:  Positive for headaches  Psychiatric:  Positive for anxiety  Heme/Lymph/Imm:  Positive for easy bruising  Endocrine:  Positive for diabetes    Physical Exam:  Vitals: /85  Pulse 76  Ht 1.727 m (5' 7.99\")  Wt 89.8 kg (198 lb)  BMI 30.11 kg/m2    Constitutional:           Skin:             Head:           Eyes:           Lymph:      ENT:           Neck:           Respiratory:            Cardiac:                                                           GI:           Extremities and Muscular Skeletal:                 Neurological:           Psych:     "     Recent Lab Results:  LIPID RESULTS:  Lab Results   Component Value Date    CHOL 110 10/30/2018    HDL 36 (L) 10/30/2018    LDL 41 10/30/2018    TRIG 163 (H) 10/30/2018       LIVER ENZYME RESULTS:  Lab Results   Component Value Date    AST 21 11/28/2017    ALT 24 10/30/2018       CBC RESULTS:  Lab Results   Component Value Date    WBC 15.2 (H) 11/30/2017    RBC 4.76 11/30/2017    HGB 14.3 11/30/2017    HCT 41.6 11/30/2017    MCV 87 11/30/2017    MCH 30.0 11/30/2017    MCHC 34.4 11/30/2017    RDW 13.4 11/30/2017     11/30/2017       BMP RESULTS:  Lab Results   Component Value Date     10/30/2018    POTASSIUM 4.1 10/30/2018    CHLORIDE 103 10/30/2018    CO2 29 10/30/2018    ANIONGAP 11.1 10/30/2018     (H) 10/30/2018    BUN 13 10/30/2018    CR 0.99 10/30/2018    GFRESTIMATED 72 10/30/2018    GFRESTBLACK 87 10/30/2018    MACIEJ 9.7 10/30/2018        A1C RESULTS:  Lab Results   Component Value Date    A1C 6.0 11/28/2017       INR RESULTS:  No results found for: INR        CC  THOMAS Fiore CNP  [address]  [place] [address]    Thank you for allowing me to participate in the care of your patient.      Sincerely,     Magdy Collins MD     [facility]    cc:   THOMAS Fiore CNP  [address]  [place] [address]

## 2018-11-12 NOTE — LETTER
2018      Jericho Guillory MD  [facility] [place] [address] [place] [address]      RE: Cayden Soto       Dear Colleague,    I had the pleasure of seeing Cayden Soto in the [facility].    Service Date: 2018      Jerciho Guillory MD   [facility] [place] [address] [place] [address]      RE: Cayden Soto    MRN: [id number]   : 1935      Dear Dr. Guillory:       I had the pleasure of following up on our mutual patient Cayden Soto.  Today's visit was literally 45 minutes, all counseling.  This patient is going to be switching because of his health insurance to probably [facility], so I do not know if either of us is going to see him anymore, and he is a very, very sweet man, but very confused and very nervous.  Part of the issue is that we are taking care of him, you are seeing him, and your partner Dr. Alexander has seen him.  Dr. Alexander has been excellent.  He has called me to double check on care numerous times, but as a result, the patient has become even more confused.  Currently, I have no idea what medications he is on, and the patient does not, either, because there have been a number of changes.  I finally called his pharmacy benefits people today and scheduled a phone call to him in the next hour or two to go through the medications.  I have put in our Epic system what I think the medicines he should be on, but we again have no idea.        Since he is going to be switching services, this letter will also serve as an introduction to his new cardiologist.  He is a very pleasant, but very worried gentleman.  He presented with a non-STEMI a year ago.  His heart cath performed by my partner showed the left main had a 30% narrowing.  The LAD had diffuse 30%-40% narrowing.  D1 was a very small artery and had a 60% ostial narrowing.  D2 was a small vessel and occluded and collateralized.   The left circumflex had minimal irregularity.  The right coronary artery was a large dominant vessel, and it had 90% proximal PDA.  The ramus was a large branch vessel with a 90% narrowing.  Drug-eluting stents:  A 3.5 x 16 Promus was placed to the ramus, and a 2.25 x 28 Promus was placed to the PDA with good results.  At the time of his angiogram, they reported an ejection fraction of 55% with some hypokinesis along the anterolateral and lateral wall most consistent with the ramus branch.  He had an echocardiogram, also, a few months later that reports an ejection fraction of 54%, normal wall motion, mild AI, aortic sclerosis without stenosis, right ventricle normal.  Mitral valve has some annular calcification, but the valve is functional.  The tricuspid valve is normal, and there is no pulmonary hypertension.  Importantly, though, they report the sinus of Valsalva measuring 40 mm, and the ascending aorta is normal.  This is critically important because the patient told me that either you or Dr. Alexander ordered a CAT scan because of lung nodules seen, and he went to some outside radiology structure that we do not know where it was, but not in the [employer] system, and he got a call that his aorta was 50 mm.  The patient asked me numerous questions about that, and I felt uncomfortable answering since I cannot see the CAT scan, and I do not know what they are referring to.  If indeed the aorta really was 40 mm at the sinus of Valsalva this past year, and now it is 50, then he will need aorta surgery.  I cannot make any further comments until I know if it really increased this much in one year or not.  This is why I think it is imperative that this patient finally get one health system and one main doctor.  He is going to be switching his insurance plan, so I am going to hope that the [employer] will be able to take over that task.  From a cardiac standpoint, he reports absolutely no angina, no dizziness.   He is doing well.  His blood pressure is running upper normal.  Again, it is hard for me to clarify what I should do with his medicines because I do not know what they are.  Dr. Alexander placed him on clonidine, and I suggested that that does not usually work well in older people, and indeed he got the classic side effect of fatigue, so he was slowly weaned off of it.  What I think he should be on, but again I do not know what he is on now, will eventually be hydralazine 25 mg t.i.d., carvedilol 12.5 b.i.d., clonidine 0.1 mg only once a day p.r.n. systolic blood pressure greater than 160.  He is a very nervous man.  He checks his blood pressure a lot, and he gets very worried when he sees high numbers, which probably makes the blood pressure go even higher.  He stopped his Crestor because of muscle ache, but he has a significant lipid abnormality.  I am going to have him try Crestor 2.5 every other day.  The Plavix will end when the current bottle is empty because this is the one year catia.  The Cozaar we think is 100 mg a day, but it might be 50; we do not know.  The Glucophage I will leave to you to comment on.        I have not scheduled any further followup appointments because he is going to be switching health systems within the next month or so.  The pharmacy benefits person will be calling him today.  I appreciate any of your input.  Again, I think the imperative things are to make sure we know what blood pressure pills he is on to make sure his blood pressure and pulse are adequate and then follow up on this disturbing CAT scan, which may or may not show significant enlargement, within a year.  That will probably be tasked to his new health system.  Again, I handwrote directions.  I printed an after-visit summary.  I talked to the patient point by point.  He was clearly confused.  Also, somewhere along the line, he told me that my nurse told him that the CPAP machine will not be so hard on his heart, and I  said it is the other way around.  Untreated sleep apnea is hard on the heart, so he had a whole list of questions about sleep apnea.  Again, I do not have any of those tests to know how much sleep apnea he has or if he was prescribed a CPAP.  Then he asked about salt; I did my best to talk about it.  Then he talked about an exercise program.  We talked about getting on a bike because he has arthritis in his knee, and he is not stable walking.  I really covered a lot of subjects here today, but I think this patient would be best served by having a family member show up to future visits.  He is clearly not able to follow the discussions here, but I did my best that I could in terms of handwriting directions, printing directions and calling the pharmacy  to talk to him today and asked him to follow up with either you or Dr. Alexander again.  I again want to compliment Dr. Alexander.  He is a very caring gentleman.  Even though I have xxxxxxxxxxxxxxxxxx met him, he has talked to me numerous times, and we have tried to work out a plan over the phone, but I think the patient is not able to follow phone directions.      Sincerely,      MD LU Mercer MD             D: 2018   T: 2018   MT: marko      Name:     ABIGAIL FAGAN   MRN:      [id number]        Account:      [id number]   :      1935           Service Date: 2018      Document: [id number]           Outpatient Encounter Prescriptions as of 2018   Medication Sig Dispense Refill     amLODIPine (NORVASC) 5 MG tablet Take 1 tablet (5 mg) by mouth daily 90 tablet 3     aspirin EC 81 MG EC tablet Take 1 tablet (81 mg) by mouth daily       cloNIDine (CATAPRES) 0.1 MG tablet Take 1 tablet (0.1mg) a day if systolic blood pressure > 160mmHg 30 tablet 1     clopidogrel (PLAVIX) 75 MG tablet Take 1 tablet (75 mg) by mouth daily 90 tablet 3     hydrALAZINE (APRESOLINE) 25 MG tablet Take 1 tablet (25  mg) by mouth 3 times daily 90 tablet 11     losartan (COZAAR) 100 MG tablet Take 1 tablet (100 mg) by mouth daily 90 tablet 3     metFORMIN (GLUCOPHAGE) 1000 MG tablet Take 1 tablet (1,000 mg) by mouth 2 times daily (with meals) 60 tablet 0     Multiple Vitamins-Minerals (CENTRUM SILVER) per tablet Take 1 tablet by mouth daily       nitroGLYcerin (NITROSTAT) 0.4 MG sublingual tablet For chest pain place 1 tablet under the tongue every 5 minutes for 3 doses. If symptoms persist 5 minutes after 1st dose call 911. 25 tablet 3     [DISCONTINUED] carvedilol (COREG) 6.25 MG tablet Take 2 tablets (12.5 mg) by mouth 2 times daily (with meals) 60 tablet      [DISCONTINUED] rosuvastatin (CRESTOR) 5 MG tablet Take 2.5 mg by mouth every other day 90 tablet 3     CLONIDINE HCL PO Take 0.05 mg by mouth 3 times daily Take 0.05mg twice welsh 11/8 and 11/9, then take 0.05mg once daily on 11/10 and 11/11, then stop taking on 11/12.         [DISCONTINUED] carvedilol (COREG) 6.25 MG tablet 2 tabs (12.5mg) in AM, 1 tab 6.25mg in  tablet 1     [DISCONTINUED] hydrALAZINE (APRESOLINE) 10 MG tablet Take 1 tablet (10 mg) by mouth 3 times daily If SBP >130, increase dose to 20mg ( 2 tabs) three times daily 405 tablet 3     [DISCONTINUED] rosuvastatin (CRESTOR) 5 MG tablet One pill every other day and as directed (Patient not taking: Reported on 11/12/2018) 90 tablet 3     No facility-administered encounter medications on file as of 11/12/2018.        Again, thank you for allowing me to participate in the care of your patient.      Sincerely,    Magdy Collins MD     [facility]

## 2018-11-14 DIAGNOSIS — I25.10 CAD (CORONARY ARTERY DISEASE): Primary | ICD-10-CM

## 2018-11-14 RX ORDER — CARVEDILOL 6.25 MG/1
12.5 TABLET ORAL 2 TIMES DAILY WITH MEALS
Qty: 180 TABLET | Refills: 3 | Status: SHIPPED | OUTPATIENT
Start: 2018-11-14 | End: 2019-05-30

## 2018-11-14 RX ORDER — ROSUVASTATIN CALCIUM 5 MG/1
2.5 TABLET, COATED ORAL EVERY OTHER DAY
Qty: 45 TABLET | Refills: 3 | Status: SHIPPED | OUTPATIENT
Start: 2018-11-14 | End: 2019-05-30

## 2019-02-08 ENCOUNTER — APPOINTMENT (OUTPATIENT)
Dept: CT IMAGING | Facility: CLINIC | Age: 84
End: 2019-02-08
Attending: EMERGENCY MEDICINE
Payer: MEDICARE

## 2019-02-08 ENCOUNTER — HOSPITAL ENCOUNTER (EMERGENCY)
Facility: CLINIC | Age: 84
Discharge: HOME | End: 2019-02-08
Attending: EMERGENCY MEDICINE | Admitting: EMERGENCY MEDICINE
Payer: MEDICARE

## 2019-02-08 VITALS
WEIGHT: 200 LBS | SYSTOLIC BLOOD PRESSURE: 126 MMHG | DIASTOLIC BLOOD PRESSURE: 79 MMHG | BODY MASS INDEX: 30.42 KG/M2 | RESPIRATION RATE: 20 BRPM | HEART RATE: 71 BPM | TEMPERATURE: 97.7 F | OXYGEN SATURATION: 94 %

## 2019-02-08 DIAGNOSIS — N23 RENAL COLIC: ICD-10-CM

## 2019-02-08 DIAGNOSIS — R82.90 ABNORMAL URINALYSIS: ICD-10-CM

## 2019-02-08 DIAGNOSIS — N20.1 URETERAL STONE: ICD-10-CM

## 2019-02-08 LAB
ALBUMIN SERPL-MCNC: 3.6 G/DL (ref 3.4–5)
ALBUMIN UR-MCNC: 100 MG/DL
ALP SERPL-CCNC: 54 U/L (ref 40–150)
ALT SERPL W P-5'-P-CCNC: 21 U/L (ref 0–70)
ANION GAP SERPL CALCULATED.3IONS-SCNC: 6 MMOL/L (ref 3–14)
APPEARANCE UR: ABNORMAL
AST SERPL W P-5'-P-CCNC: 14 U/L (ref 0–45)
BASOPHILS # BLD AUTO: 0 10E9/L (ref 0–0.2)
BASOPHILS NFR BLD AUTO: 0.3 %
BILIRUB SERPL-MCNC: 0.6 MG/DL (ref 0.2–1.3)
BILIRUB UR QL STRIP: NEGATIVE
BUN SERPL-MCNC: 12 MG/DL (ref 7–30)
CALCIUM SERPL-MCNC: 9.6 MG/DL (ref 8.5–10.1)
CHLORIDE SERPL-SCNC: 106 MMOL/L (ref 94–109)
CO2 SERPL-SCNC: 28 MMOL/L (ref 20–32)
COLOR UR AUTO: YELLOW
CREAT SERPL-MCNC: 0.84 MG/DL (ref 0.66–1.25)
DIFFERENTIAL METHOD BLD: NORMAL
EOSINOPHIL # BLD AUTO: 0.2 10E9/L (ref 0–0.7)
EOSINOPHIL NFR BLD AUTO: 2.2 %
ERYTHROCYTE [DISTWIDTH] IN BLOOD BY AUTOMATED COUNT: 13.5 % (ref 10–15)
GFR SERPL CREATININE-BSD FRML MDRD: 81 ML/MIN/{1.73_M2}
GLUCOSE SERPL-MCNC: 100 MG/DL (ref 70–99)
GLUCOSE UR STRIP-MCNC: NEGATIVE MG/DL
HCT VFR BLD AUTO: 41.4 % (ref 40–53)
HGB BLD-MCNC: 14 G/DL (ref 13.3–17.7)
HGB UR QL STRIP: ABNORMAL
HYALINE CASTS #/AREA URNS LPF: 11 /LPF (ref 0–2)
IMM GRANULOCYTES # BLD: 0 10E9/L (ref 0–0.4)
IMM GRANULOCYTES NFR BLD: 0.2 %
KETONES UR STRIP-MCNC: NEGATIVE MG/DL
LEUKOCYTE ESTERASE UR QL STRIP: ABNORMAL
LIPASE SERPL-CCNC: 91 U/L (ref 73–393)
LYMPHOCYTES # BLD AUTO: 1.3 10E9/L (ref 0.8–5.3)
LYMPHOCYTES NFR BLD AUTO: 14 %
MCH RBC QN AUTO: 29.5 PG (ref 26.5–33)
MCHC RBC AUTO-ENTMCNC: 33.8 G/DL (ref 31.5–36.5)
MCV RBC AUTO: 87 FL (ref 78–100)
MONOCYTES # BLD AUTO: 0.8 10E9/L (ref 0–1.3)
MONOCYTES NFR BLD AUTO: 9.2 %
MUCOUS THREADS #/AREA URNS LPF: PRESENT /LPF
NEUTROPHILS # BLD AUTO: 6.7 10E9/L (ref 1.6–8.3)
NEUTROPHILS NFR BLD AUTO: 74.1 %
NITRATE UR QL: NEGATIVE
NRBC # BLD AUTO: 0 10*3/UL
NRBC BLD AUTO-RTO: 0 /100
PH UR STRIP: 6 PH (ref 5–7)
PLATELET # BLD AUTO: 202 10E9/L (ref 150–450)
POTASSIUM SERPL-SCNC: 4.1 MMOL/L (ref 3.4–5.3)
PROT SERPL-MCNC: 7.2 G/DL (ref 6.8–8.8)
RBC # BLD AUTO: 4.74 10E12/L (ref 4.4–5.9)
RBC #/AREA URNS AUTO: >182 /HPF (ref 0–2)
SODIUM SERPL-SCNC: 140 MMOL/L (ref 133–144)
SOURCE: ABNORMAL
SP GR UR STRIP: 1.01 (ref 1–1.03)
UROBILINOGEN UR STRIP-MCNC: NORMAL MG/DL (ref 0–2)
WBC # BLD AUTO: 9 10E9/L (ref 4–11)
WBC #/AREA URNS AUTO: 155 /HPF (ref 0–5)

## 2019-02-08 PROCEDURE — A9270 NON-COVERED ITEM OR SERVICE: HCPCS | Mod: GY | Performed by: EMERGENCY MEDICINE

## 2019-02-08 PROCEDURE — 85025 COMPLETE CBC W/AUTO DIFF WBC: CPT | Performed by: EMERGENCY MEDICINE

## 2019-02-08 PROCEDURE — 87086 URINE CULTURE/COLONY COUNT: CPT | Performed by: EMERGENCY MEDICINE

## 2019-02-08 PROCEDURE — 83690 ASSAY OF LIPASE: CPT | Performed by: EMERGENCY MEDICINE

## 2019-02-08 PROCEDURE — 74176 CT ABD & PELVIS W/O CONTRAST: CPT

## 2019-02-08 PROCEDURE — 25000132 ZZH RX MED GY IP 250 OP 250 PS 637: Mod: GY | Performed by: EMERGENCY MEDICINE

## 2019-02-08 PROCEDURE — 99284 EMERGENCY DEPT VISIT MOD MDM: CPT | Mod: 25

## 2019-02-08 PROCEDURE — 80053 COMPREHEN METABOLIC PANEL: CPT | Performed by: EMERGENCY MEDICINE

## 2019-02-08 PROCEDURE — 81001 URINALYSIS AUTO W/SCOPE: CPT | Performed by: EMERGENCY MEDICINE

## 2019-02-08 RX ORDER — ACETAMINOPHEN 500 MG
1000 TABLET ORAL ONCE
Status: COMPLETED | OUTPATIENT
Start: 2019-02-08 | End: 2019-02-08

## 2019-02-08 RX ORDER — SODIUM CHLORIDE 9 MG/ML
1000 INJECTION, SOLUTION INTRAVENOUS CONTINUOUS
Status: DISCONTINUED | OUTPATIENT
Start: 2019-02-08 | End: 2019-02-08 | Stop reason: HOSPADM

## 2019-02-08 RX ORDER — HYDROCODONE BITARTRATE AND ACETAMINOPHEN 5; 325 MG/1; MG/1
TABLET ORAL
Qty: 10 TABLET | Refills: 0 | Status: ON HOLD | OUTPATIENT
Start: 2019-02-08 | End: 2020-01-03

## 2019-02-08 RX ORDER — ONDANSETRON 4 MG/1
4 TABLET, ORALLY DISINTEGRATING ORAL EVERY 8 HOURS PRN
Qty: 10 TABLET | Refills: 0 | Status: SHIPPED | OUTPATIENT
Start: 2019-02-08 | End: 2019-03-10

## 2019-02-08 RX ADMIN — ACETAMINOPHEN 1000 MG: 500 TABLET, FILM COATED ORAL at 13:25

## 2019-02-08 NOTE — ED PROVIDER NOTES
History     Chief Complaint:  Abdominal pain     HPI   HPI limited secondary to poor historian.    Cayden Dewitt is a 83 year old male, with a history of HTN, HDL, CAD, NSTEMI, and diabetes, who presents with his wife to the emergency department for evaluation of low abdominal pain. The patient reports he has a history of three kidney stones, two in his kidney and one in his ureter proximal to the bladder. He reports experiencing hematuria last week, drank plenty of water and noticed the hematuria had resolved this week. He notes starting to experiencing low abdominal pain last evening. He notes his last bowel movement was last night and was normal for him.    He reports that he has seen Dr. Gricel Haines through [facility] urology and had surgery tentatively scheduled for late last fall, though this was postponed because he was wheezing at the time.  He has been told that his left-sided stone is too big to pass.  No fevers or vomiting.  No analgesics taken at home for the symptoms.    Chart review shows that he has a history of open retropubic prostatectomy for enlarged prostate.  He denies any history of prior urologic intervention for ureteral stones.  He does not have a sensation of urinary retention or increased frequency.      Allergies:  Ciprofloxacin  Contrast Dye: facial and lip swelling  Iodine     Medications:    Amlodipine  81 mg Aspirin  Coreg  Clonidine  Plavix  Hydralazine  Losartan  Metformin  Nitrostat  Crestor    Past Medical History:    HTN  HDL  Ascending aorta dilation  CAD  NSTEMI  Diabetes  Asthma    Past Surgical History:    Coronary angiography  GI surgery    Family History:    Alzheimer disease    Social History:  Smoking status: Never  Alcohol use: No  The patient presents to the emergency department with his wife.  Marital Status:   [2]     Review of Systems   Reason unable to perform ROS: poor historian.       Physical Exam   Patient Vitals for the past 24 hrs:   BP Temp  Temp src Heart Rate Resp SpO2 Weight   02/08/19 1247 144/75 97.7  F (36.5  C) Oral 80 20 97 % 90.7 kg (200 lb)     Physical Exam  General: Nontoxic-appearing male semirecumbent in room 3, wife at bedside  HENT: mucous membranes moist  CV: regular rate, regular rhythm  Resp: clear throughout, normal effort, no crackles or wheezing  GI: abdomen soft and mildly tender in LLQ, no guarding, no palpable masses  MSK:   Thoracic spine: nontender, no CVAT  Lumbar spine: nontender  Pelvis stable.  : normal uncircumcised penis without urethral discharge, nontender testes with normal lie, no scrotal masses or fluctuance, epididymis nontender  Skin: appropriately warm and dry, no erythema/ecchymosis/vesicles to back  Neuro: alert, clear speech, oriented though somewhat poor historian, ambulatory  Psych: pleasant, cooperative      Emergency Department Course   Imaging:  Radiographic findings were communicated with the patient and family who voiced understanding of the findings.    CT Abdomen Pelvis w/o Contrast  IMPRESSION:   1. Two distal left ureteral calculi that results in mild obstruction,  the largest measuring 1 cm in diameter. The ureter appears inflamed  about the site of the calculi.  2. Single small nonobstructing bilateral renal calculi.  As read by Radiology.    Laboratory:  UA: Blood (Moderate), Protein Albumin (100), Leukocyte Esterase (Large), WBC (155), Mucous (Present), Hyalin Casts (11)   Urine Culture: pending    CBC: AWNL (WBC 9.0, HGB 14.0, )  CMP: Glucose 100 (H) o/w WNL (Creatinine 0.84)  Lipase: 91    Interventions:  1325 Tylenol 1000 mg PO    Emergency Department Course:  Past medical records, nursing notes, and vitals reviewed.  1259: I performed an exam of the patient and obtained history, as documented above.    IV inserted and blood drawn.    The patient was sent for an abdominal pelvis CT while in the emergency department, findings above.    1521: I rechecked the patient. Explained findings  to the patient and his wife.    1550: I discussed the patient and his current status with staff at his urology clinic.    1559: I rechecked the patient. Findings and plan explained to the Patient and spouse. Patient discharged home with instructions regarding supportive care, medications, and reasons to return. The importance of close follow-up was reviewed.   Impression & Plan    Medical Decision Making:  I think his presenting symptoms are likely due to ongoing symptoms from his ureteral stone.  This is been previously diagnosed though not yet intervened on.  Has had no fevers vomiting or other features to raise higher concern for infected kidney stone, so antibiotics were withheld though given some degree of pyuria urine culture was sent.  No signs of renal insufficiency.  No leukocytosis.  He does not appear ill and declined any additional analgesics.  I spoke with his urology team who will facilitate close outpatient follow-up next week, to reconsider procedural intervention.  I explained to him that a stone of this size in his ureter is unlikely to pass spontaneously.  This has been present in his ureter for many months now.  Potential side effects of prescribed medications were reviewed.  Differential diagnosis also included aortic pathology, bowel obstruction, diverticulitis, and many others.  Return precautions for high fever unbearable pain or any other concerns.  He was discharged home in improved condition after discussion of the above.    Diagnosis:    ICD-10-CM   1. Ureteral stone N20.1   2. Abnormal urinalysis R82.90   3. Renal colic N23     Disposition:  Discharged to home with instructions for follow up.  Discharge Medications:  Started    HYDROcodone-acetaminophen 5-325 MG tablet  Commonly known as:  NORCO  Take 1-2 tablets every 6 hours as needed for pain. DO NOT TAKE WITH ANY OTHER MEDICATIONS CONTAINING TYLENOL (ACETAMINOPHEN)     ondansetron 4 MG ODT tab  Commonly known as:  ZOFRAN ODT  4 mg,  Oral, EVERY 8 HOURS PRN       This record was created at least in part using electronic voice recognition software, so please excuse any typographical errors.      Antionette Alvarado  2/8/2019    EMERGENCY DEPARTMENT  Scribe Disclosure:  I, Antionette Alvarado, am serving as a scribe at 12:59 PM on 2/8/2019 to document services personally performed by Apolinar Priest MD based on my observations and the provider's statements to me.      Apolinar Priest MD  02/08/19 8598

## 2019-02-08 NOTE — ED AVS SNAPSHOT
Emergency Department  [address] [place] [address]  Phone:  [phone]  Fax:  [phone]                                    Cayden Dewitt   MRN: [id number]    Department:   Emergency Department   Date of Visit:  2/8/2019           After Visit Summary Signature Page    I have received my discharge instructions, and my questions have been answered. I have discussed any challenges I see with this plan with the nurse or doctor.    ..........................................................................................................................................  Patient/Patient Representative Signature      ..........................................................................................................................................  Patient Representative Print Name and Relationship to Patient    ..................................................               ................................................  Date                                   Time    ..........................................................................................................................................  Reviewed by Signature/Title    ...................................................              ..............................................  Date                                               Time          22EPIC Rev 08/18

## 2019-02-09 LAB
BACTERIA SPEC CULT: NO GROWTH
Lab: NORMAL
SPECIMEN SOURCE: NORMAL

## 2019-05-30 DIAGNOSIS — I25.10 CAD (CORONARY ARTERY DISEASE): ICD-10-CM

## 2019-05-30 RX ORDER — CARVEDILOL 6.25 MG/1
12.5 TABLET ORAL 2 TIMES DAILY WITH MEALS
Qty: 360 TABLET | Refills: 1 | Status: SHIPPED | OUTPATIENT
Start: 2019-05-30

## 2019-05-30 RX ORDER — ROSUVASTATIN CALCIUM 5 MG/1
2.5 TABLET, COATED ORAL EVERY OTHER DAY
Qty: 45 TABLET | Refills: 0 | Status: SHIPPED | OUTPATIENT
Start: 2019-05-30

## 2019-12-14 ENCOUNTER — HOSPITAL ENCOUNTER (EMERGENCY)
Facility: CLINIC | Age: 84
Discharge: HOME | End: 2019-12-14
Attending: EMERGENCY MEDICINE | Admitting: EMERGENCY MEDICINE
Payer: MEDICARE

## 2019-12-14 ENCOUNTER — APPOINTMENT (OUTPATIENT)
Dept: CT IMAGING | Facility: CLINIC | Age: 84
End: 2019-12-14
Attending: EMERGENCY MEDICINE
Payer: MEDICARE

## 2019-12-14 VITALS
TEMPERATURE: 98 F | OXYGEN SATURATION: 98 % | DIASTOLIC BLOOD PRESSURE: 75 MMHG | RESPIRATION RATE: 18 BRPM | HEART RATE: 80 BPM | SYSTOLIC BLOOD PRESSURE: 149 MMHG

## 2019-12-14 DIAGNOSIS — N20.1 URETEROLITHIASIS: ICD-10-CM

## 2019-12-14 LAB
ALBUMIN UR-MCNC: NEGATIVE MG/DL
ANION GAP SERPL CALCULATED.3IONS-SCNC: 3 MMOL/L (ref 3–14)
APPEARANCE UR: CLEAR
BACTERIA #/AREA URNS HPF: ABNORMAL /HPF
BASOPHILS # BLD AUTO: 0 10E9/L (ref 0–0.2)
BASOPHILS NFR BLD AUTO: 0.2 %
BILIRUB UR QL STRIP: NEGATIVE
BUN SERPL-MCNC: 19 MG/DL (ref 7–30)
CALCIUM SERPL-MCNC: 9.7 MG/DL (ref 8.5–10.1)
CHLORIDE SERPL-SCNC: 102 MMOL/L (ref 94–109)
CO2 SERPL-SCNC: 31 MMOL/L (ref 20–32)
COLOR UR AUTO: YELLOW
CREAT BLD-MCNC: 1.2 MG/DL (ref 0.66–1.25)
CREAT SERPL-MCNC: 1.13 MG/DL (ref 0.66–1.25)
DIFFERENTIAL METHOD BLD: NORMAL
EOSINOPHIL # BLD AUTO: 0.1 10E9/L (ref 0–0.7)
EOSINOPHIL NFR BLD AUTO: 1.2 %
ERYTHROCYTE [DISTWIDTH] IN BLOOD BY AUTOMATED COUNT: 13.4 % (ref 10–15)
GFR SERPL CREATININE-BSD FRML MDRD: 58 ML/MIN/{1.73_M2}
GFR SERPL CREATININE-BSD FRML MDRD: 59 ML/MIN/{1.73_M2}
GLUCOSE SERPL-MCNC: 106 MG/DL (ref 70–99)
GLUCOSE UR STRIP-MCNC: NEGATIVE MG/DL
HCT VFR BLD AUTO: 44.5 % (ref 40–53)
HGB BLD-MCNC: 15 G/DL (ref 13.3–17.7)
HGB UR QL STRIP: NEGATIVE
IMM GRANULOCYTES # BLD: 0 10E9/L (ref 0–0.4)
IMM GRANULOCYTES NFR BLD: 0.2 %
KETONES UR STRIP-MCNC: NEGATIVE MG/DL
LEUKOCYTE ESTERASE UR QL STRIP: NEGATIVE
LYMPHOCYTES # BLD AUTO: 1.4 10E9/L (ref 0.8–5.3)
LYMPHOCYTES NFR BLD AUTO: 14.4 %
MCH RBC QN AUTO: 29.8 PG (ref 26.5–33)
MCHC RBC AUTO-ENTMCNC: 33.7 G/DL (ref 31.5–36.5)
MCV RBC AUTO: 89 FL (ref 78–100)
MONOCYTES # BLD AUTO: 0.9 10E9/L (ref 0–1.3)
MONOCYTES NFR BLD AUTO: 9.6 %
NEUTROPHILS # BLD AUTO: 7 10E9/L (ref 1.6–8.3)
NEUTROPHILS NFR BLD AUTO: 74.4 %
NITRATE UR QL: NEGATIVE
NRBC # BLD AUTO: 0 10*3/UL
NRBC BLD AUTO-RTO: 0 /100
PH UR STRIP: 5.5 PH (ref 5–7)
PLATELET # BLD AUTO: 227 10E9/L (ref 150–450)
POTASSIUM SERPL-SCNC: 4.1 MMOL/L (ref 3.4–5.3)
RBC # BLD AUTO: 5.03 10E12/L (ref 4.4–5.9)
RBC #/AREA URNS AUTO: 1 /HPF (ref 0–2)
SODIUM SERPL-SCNC: 136 MMOL/L (ref 133–144)
SOURCE: ABNORMAL
SP GR UR STRIP: 1.01 (ref 1–1.03)
UROBILINOGEN UR STRIP-MCNC: NORMAL MG/DL (ref 0–2)
WBC # BLD AUTO: 9.4 10E9/L (ref 4–11)
WBC #/AREA URNS AUTO: 3 /HPF (ref 0–5)

## 2019-12-14 PROCEDURE — 80048 BASIC METABOLIC PNL TOTAL CA: CPT | Performed by: EMERGENCY MEDICINE

## 2019-12-14 PROCEDURE — 82565 ASSAY OF CREATININE: CPT | Mod: 91

## 2019-12-14 PROCEDURE — 74177 CT ABD & PELVIS W/CONTRAST: CPT

## 2019-12-14 PROCEDURE — 25000128 H RX IP 250 OP 636: Performed by: EMERGENCY MEDICINE

## 2019-12-14 PROCEDURE — 81001 URINALYSIS AUTO W/SCOPE: CPT | Performed by: EMERGENCY MEDICINE

## 2019-12-14 PROCEDURE — 25000125 ZZHC RX 250: Performed by: EMERGENCY MEDICINE

## 2019-12-14 PROCEDURE — 96375 TX/PRO/DX INJ NEW DRUG ADDON: CPT

## 2019-12-14 PROCEDURE — 96374 THER/PROPH/DIAG INJ IV PUSH: CPT | Mod: 59

## 2019-12-14 PROCEDURE — 85025 COMPLETE CBC W/AUTO DIFF WBC: CPT | Performed by: EMERGENCY MEDICINE

## 2019-12-14 PROCEDURE — 99285 EMERGENCY DEPT VISIT HI MDM: CPT | Mod: 25

## 2019-12-14 RX ORDER — IOPAMIDOL 755 MG/ML
101 INJECTION, SOLUTION INTRAVASCULAR ONCE
Status: COMPLETED | OUTPATIENT
Start: 2019-12-14 | End: 2019-12-14

## 2019-12-14 RX ORDER — ONDANSETRON 4 MG/1
4 TABLET, ORALLY DISINTEGRATING ORAL EVERY 8 HOURS PRN
Qty: 10 TABLET | Refills: 0 | Status: SHIPPED | OUTPATIENT
Start: 2019-12-14

## 2019-12-14 RX ORDER — DIPHENHYDRAMINE HYDROCHLORIDE 50 MG/ML
25 INJECTION INTRAMUSCULAR; INTRAVENOUS ONCE
Status: COMPLETED | OUTPATIENT
Start: 2019-12-14 | End: 2019-12-14

## 2019-12-14 RX ORDER — TAMSULOSIN HYDROCHLORIDE 0.4 MG/1
0.4 CAPSULE ORAL DAILY
Qty: 10 CAPSULE | Refills: 0 | Status: ON HOLD | OUTPATIENT
Start: 2019-12-14 | End: 2020-01-03

## 2019-12-14 RX ORDER — METHYLPREDNISOLONE SODIUM SUCCINATE 125 MG/2ML
125 INJECTION, POWDER, LYOPHILIZED, FOR SOLUTION INTRAMUSCULAR; INTRAVENOUS ONCE
Status: COMPLETED | OUTPATIENT
Start: 2019-12-14 | End: 2019-12-14

## 2019-12-14 RX ORDER — HYDROCODONE BITARTRATE AND ACETAMINOPHEN 5; 325 MG/1; MG/1
1-2 TABLET ORAL EVERY 6 HOURS PRN
Qty: 15 TABLET | Refills: 0 | Status: ON HOLD | OUTPATIENT
Start: 2019-12-14 | End: 2020-01-03

## 2019-12-14 RX ADMIN — IOPAMIDOL 101 ML: 755 INJECTION, SOLUTION INTRAVENOUS at 15:51

## 2019-12-14 RX ADMIN — SODIUM CHLORIDE 71 ML: 9 INJECTION, SOLUTION INTRAVENOUS at 15:52

## 2019-12-14 RX ADMIN — METHYLPREDNISOLONE SODIUM SUCCINATE 125 MG: 125 INJECTION, POWDER, FOR SOLUTION INTRAMUSCULAR; INTRAVENOUS at 15:34

## 2019-12-14 RX ADMIN — DIPHENHYDRAMINE HYDROCHLORIDE 25 MG: 50 INJECTION, SOLUTION INTRAMUSCULAR; INTRAVENOUS at 15:34

## 2019-12-14 ASSESSMENT — ENCOUNTER SYMPTOMS
ABDOMINAL PAIN: 1
BACK PAIN: 1
NUMBNESS: 0
WEAKNESS: 0

## 2019-12-14 NOTE — ED PROVIDER NOTES
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 About a week ago, the patient jumped out of bed too quickly and twisted his back, and thinks after this he developed right lower back soreness, sometimes going across the back. He's noted that this now only bothers him while laying down at night as he is able to stand and bend pain free during the day. The patient does describes some bladder incontinence when he bends over which the patient says is due to a lithotripsy procedure earlier this June which the urologist told him not be too worried about. He had an episode of diarrheal stool about a week ago that was hard to control but no other bowel incontinence since then. He otherwise denies lower extremity numbness or weakness and maintains rectal sensation. Patient also notes that he had some transient stinging chest pains, a couple of episodes lasting a few seconds yesterday, but resolved now. He also states his back pain radiates to his R abdomen on occasion.     Allergies:  Atorvastatin  Cipro  Iodine  Morphine     Medications:    Baby aspirin  Sildenafil  Amlodipine  Losartan  Coreg  Crestor  Albuterol inhaler  Metformin  Hydralazine  Amoxicillin     Past Medical History:    Hypertension  Hyperlipidemia  Ascending aorta dilatation  CAD  NSTEMI  ACS  Type 2 diabetes  Asthma  Urteral stone  Lung nodule     Past Surgical History:    Coronary angiography  TURP  Kidney stone surgery     Family History:    Alzheimer disease    Social History:  Smoking status: Never  Alcohol use: No  Patient presents with wife.  PCP: Jericho Guillory    Marital Status:       Review of Systems   Cardiovascular: Positive for chest pain.   Gastrointestinal: Positive for abdominal pain.    Musculoskeletal: Positive for back pain.   Neurological: Negative for weakness and numbness.   All other systems reviewed and are negative.    Physical Exam     Patient Vitals for the past 24 hrs:   BP Temp Temp src Pulse Heart Rate Resp SpO2   12/14/19 1424 (!) 149/75 98  F (36.7  C) Oral 80 80 18 98 %      Physical Exam  VS: Reviewed per above  HENT: Mucous membranes moist  EYES: sclera anicteric  CV: Rate as noted, regular rhythm.   RESP: Effort normal. Breath sounds are normal bilaterally.  GI: no tenderness/rebound/guarding, not distended.  No CVA tenderness.  NEURO: Alert, moving all extremities with 5/5 strength, sensation intact to light touch in the bilateral lower extremities.  MSK: No deformity of the extremities.  No midline vertebral tenderness.  SKIN: Warm and dry    Emergency Department Course     Imaging:  Radiographic findings were communicated with the patient and family who voiced understanding of the findings.     CT-scan Abdomen/Pelvis w/ contrast:  9 mm stone at the right ureteropelvic junction with mild  proximal hydronephrosis and stranding.   Result per radiology    Laboratory:  CBC: WBC 9.4, HGB 15.0,    Creatinine POCT: 1.2, GFR 58 (L)  BMP: Glucose 106 (H), GFR 59 (L), o/w WNL (Creatinine 1.13)     UA w/ micro: Bacteria few, o/w negative (midstream)     Procedures:  None.     Interventions:  1534 - Solumedrol 125 mg IV  1534 - Benadryl 25 mg IV    Emergency Department Course:  Past medical records, nursing notes, and vitals reviewed.  1429: I performed an exam of the patient and obtained history, as documented above.     IV inserted and blood drawn. This was sent to laboratory for testing, findings above.  The patient was sent for a CT-scan of abdomen and pelvis while in the emergency department, findings above.      1620: I rechecked the patient. Findings and plan explained to the Patient and spouse. Patient discharged home with instructions regarding supportive care,  medications, and reasons to return. The importance of close follow-up was reviewed.      Impression & Plan      Medical Decision Making:  Cayden Dewitt is a 84 year old male who presented with unilateral flank & abdominal pain consistent with renal colic. CT confirms a 9mm ureteral stone at the R UVJ.  Cr increased to 1.2 from .78 in June 2019. I encouraged good hydration and close urology f/u. CT and lab workup show no other alternative etiology that could be causing his symptoms (e.g., AAA, appendicitis, pyelonephritis). There is no fever or convincing evidence of a urinary tract infection. On recheck, his pain is controlled. I will prescribe supportive medications and Flomax to facilitate stone passage. I have advised him to return for uncontrolled pain, vomiting, fever, or any other concerning symptoms. I also advised to strain his urine to look for a stone and submit it to his primary doctor for lab analysis.  Finally, I have advised follow up with urology within 3-5 days.    Diagnosis:    ICD-10-CM    1. Ureterolithiasis N20.1        Disposition:  discharged to home with wife.    Discharge Medications:  New Prescriptions    HYDROCODONE-ACETAMINOPHEN (NORCO) 5-325 MG TABLET    Take 1-2 tablets by mouth every 6 hours as needed for severe pain    ONDANSETRON (ZOFRAN ODT) 4 MG ODT TAB    Take 1 tablet (4 mg) by mouth every 8 hours as needed for nausea    TAMSULOSIN (FLOMAX) 0.4 MG CAPSULE    Take 1 capsule (0.4 mg) by mouth daily for 10 doses     Eladio Martinez  12/14/2019    EMERGENCY DEPARTMENT    Scribe Disclosure:  I, Eladio Martinez, am serving as a scribe at 2:29 PM on 12/14/2019 to document services personally performed by Ezekiel Ferrer MD based on my observations and the provider's statements to me.        Ezekiel Ferrer MD  12/14/19 0720

## 2019-12-14 NOTE — ED AVS SNAPSHOT
Emergency Department  [address] [place] [address]  Phone:  [phone]  Fax:  [phone]                                    Cayden Dewitt   MRN: [id number]    Department:   Emergency Department   Date of Visit:  12/14/2019           After Visit Summary Signature Page    I have received my discharge instructions, and my questions have been answered. I have discussed any challenges I see with this plan with the nurse or doctor.    ..........................................................................................................................................  Patient/Patient Representative Signature      ..........................................................................................................................................  Patient Representative Print Name and Relationship to Patient    ..................................................               ................................................  Date                                   Time    ..........................................................................................................................................  Reviewed by Signature/Title    ...................................................              ..............................................  Date                                               Time          22EPIC Rev 08/18

## 2019-12-16 ENCOUNTER — NURSE TRIAGE (OUTPATIENT)
Dept: NURSING | Facility: CLINIC | Age: 84
End: 2019-12-16

## 2019-12-16 NOTE — TELEPHONE ENCOUNTER
Cherry was into [facility] yesterday and diagnosed with a kidney stone.  Cayden has questions about stone passage and ureters.  FNA advised to contact urologist with any questions on recent CT and Cayden agreed.

## 2019-12-19 DIAGNOSIS — N20.0 CALCULUS OF KIDNEY: Primary | ICD-10-CM

## 2019-12-23 ENCOUNTER — OFFICE VISIT (OUTPATIENT)
Dept: UROLOGY | Facility: CLINIC | Age: 84
End: 2019-12-23
Payer: MEDICARE

## 2019-12-23 VITALS
HEART RATE: 84 BPM | HEIGHT: 68 IN | SYSTOLIC BLOOD PRESSURE: 128 MMHG | WEIGHT: 182 LBS | BODY MASS INDEX: 27.58 KG/M2 | OXYGEN SATURATION: 95 % | DIASTOLIC BLOOD PRESSURE: 80 MMHG

## 2019-12-23 DIAGNOSIS — I77.810 ASCENDING AORTA DILATATION (H): ICD-10-CM

## 2019-12-23 DIAGNOSIS — I25.10 CORONARY ARTERY DISEASE INVOLVING NATIVE CORONARY ARTERY OF NATIVE HEART WITHOUT ANGINA PECTORIS: ICD-10-CM

## 2019-12-23 DIAGNOSIS — N20.1 RIGHT URETERAL STONE: Primary | ICD-10-CM

## 2019-12-23 DIAGNOSIS — I10 BENIGN ESSENTIAL HTN: ICD-10-CM

## 2019-12-23 LAB
ALBUMIN UR-MCNC: NEGATIVE MG/DL
APPEARANCE UR: CLEAR
BILIRUB UR QL STRIP: NEGATIVE
COLOR UR AUTO: YELLOW
GLUCOSE UR STRIP-MCNC: NEGATIVE MG/DL
HGB UR QL STRIP: ABNORMAL
KETONES UR STRIP-MCNC: NEGATIVE MG/DL
LEUKOCYTE ESTERASE UR QL STRIP: NEGATIVE
NITRATE UR QL: NEGATIVE
PH UR STRIP: 7 PH (ref 5–7)
SOURCE: ABNORMAL
SP GR UR STRIP: 1.01 (ref 1–1.03)
UROBILINOGEN UR STRIP-ACNC: 0.2 EU/DL (ref 0.2–1)

## 2019-12-23 PROCEDURE — 99204 OFFICE O/P NEW MOD 45 MIN: CPT | Performed by: UROLOGY

## 2019-12-23 PROCEDURE — 81003 URINALYSIS AUTO W/O SCOPE: CPT | Performed by: UROLOGY

## 2019-12-23 ASSESSMENT — PAIN SCALES - GENERAL: PAINLEVEL: NO PAIN (0)

## 2019-12-23 ASSESSMENT — MIFFLIN-ST. JEOR: SCORE: 1490.05

## 2019-12-23 NOTE — LETTER
12/23/2019       RE: Cayden Dewitt  [address]  [facility] [address]     Dear Colleague,    Thank you for referring your patient, Cayden Dewitt, to the [facility] at [facility]. Please see a copy of my visit note below.    Urology Consult History and Physical  [place]  Name: Cayden Dewitt    MRN: [id number]   YOB: 1935       We were asked to see Cayden Dewitt at the request of ER follow up for evaluation and treatment of RIGHT ureteral stone.        Chief Complaint:   RIGHT ureteral stone    History is obtained from the patient            History of Present Illness:   Cayden Dewitt is a 84 year old male who is being seen for evaluation of RIGHT ureteral stone    Had a recent ear infection.    Has been doing well with no real issues during the day  Has a low back ache at night. This is mainly midline, slightly to the right.   He presented to the ER on 12/14 for this back pain.     Had prior history of kidney stones on the LEFT side.     Had a prostate surgery about 10 years ago - TURP.     (4 or >)  Location: RIGHT back  Quality: ache at night  Severity: mild to moderate   Duration: several weeks           Past Medical History:     Past Medical History:   Diagnosis Date     Ascending aorta dilatation (H)      Benign essential HTN      CAD (coronary artery disease)     11/29/17- ASA 3.5x16mm Promus to Ramux and ASA [id number] Promus to prox PDA     Hyperlipidemia      NSTEMI (non-ST elevated myocardial infarction) (H) 11/2017     90% lesion in the ramus stented with a 3.5 x 16 mm Promus Premier drug-eluting stent as well as a 90% PDA lesion stented with a 2.25 x 28 mm Promus Premier drug-eluting stent.LMA30%, 70% ostial Dl small vessel  treat medically. [id number] 100% The LAD was 30% diseased.  LV and diastolic pressure was 20     Type 2 diabetes mellitus without complications (H)      Uncomplicated  asthma             Past Surgical History:     Past Surgical History:   Procedure Laterality Date     CORONARY ANGIOGRAPHY ADULT ORDER  11/29/2017 11/29/17- ASA to Ramus, ASA to PDA, Lma 30%, Lad-mild 30, H6hmpxs 70% ostial, D2 small 100%, Lcx mild     CYSTOSCOPY       GENITOURINARY SURGERY      prostate     PROSTATE SURGERY              Social History:     Social History     Tobacco Use     Smoking status: Never Smoker     Smokeless tobacco: Never Used   Substance Use Topics     Alcohol use: No       History   Smoking Status     Never Smoker   Smokeless Tobacco     Never Used            Family History:     Family History   Problem Relation Age of Onset     Alzheimer Disease Mother               Allergies:     Allergies   Allergen Reactions     Ciprofloxacin      Contrast Dye      Facial and lip swelling     Iodine             Medications:     Current Outpatient Medications   Medication Sig     amLODIPine (NORVASC) 5 MG tablet Take 1 tablet (5 mg) by mouth daily     aspirin EC 81 MG EC tablet Take 1 tablet (81 mg) by mouth daily     carvedilol (COREG) 6.25 MG tablet Take 2 tablets (12.5 mg) by mouth 2 times daily (with meals)     hydrALAZINE (APRESOLINE) 25 MG tablet Take 1 tablet (25 mg) by mouth 3 times daily     losartan (COZAAR) 100 MG tablet Take 1 tablet (100 mg) by mouth daily     metFORMIN (GLUCOPHAGE) 1000 MG tablet Take 1 tablet (1,000 mg) by mouth 2 times daily (with meals)     Multiple Vitamins-Minerals (CENTRUM SILVER) per tablet Take 1 tablet by mouth daily     rosuvastatin (CRESTOR) 5 MG tablet Take 0.5 tablets (2.5 mg) by mouth every other day     cloNIDine (CATAPRES) 0.1 MG tablet Take 1 tablet (0.1mg) a day if systolic blood pressure > 160mmHg (Patient not taking: Reported on 12/23/2019)     CLONIDINE HCL PO Take 0.05 mg by mouth 3 times daily Take 0.05mg twice welsh 11/8 and 11/9, then take 0.05mg once daily on 11/10 and 11/11, then stop taking on 11/12.       HYDROcodone-acetaminophen (NORCO)  "5-325 MG tablet Take 1-2 tablets by mouth every 6 hours as needed for severe pain (Patient not taking: Reported on 12/23/2019)     HYDROcodone-acetaminophen (NORCO) 5-325 MG tablet Take 1-2 tablets every 6 hours as needed for pain.  DO NOT TAKE WITH ANY OTHER MEDICATIONS CONTAINING TYLENOL (ACETAMINOPHEN) (Patient not taking: Reported on 12/23/2019)     nitroGLYcerin (NITROSTAT) 0.4 MG sublingual tablet For chest pain place 1 tablet under the tongue every 5 minutes for 3 doses. If symptoms persist 5 minutes after 1st dose call 911. (Patient not taking: Reported on 12/23/2019)     ondansetron (ZOFRAN ODT) 4 MG ODT tab Take 1 tablet (4 mg) by mouth every 8 hours as needed for nausea (Patient not taking: Reported on 12/23/2019)     tamsulosin (FLOMAX) 0.4 MG capsule Take 1 capsule (0.4 mg) by mouth daily for 10 doses (Patient not taking: Reported on 12/23/2019)     No current facility-administered medications for this visit.              Review of Systems:     Skin: negative  Eyes: negative  Ears/Nose/Throat: negative  Respiratory: No shortness of breath, dyspnea on exertion, cough, or hemoptysis  Cardiovascular: negative  Gastrointestinal: negative  Genitourinary: as above  Musculoskeletal: negative  Neurologic: negative  Psychiatric: negative  Hematologic/Lymphatic/Immunologic: negative  Endocrine: negative          Physical Exam:     Patient Vitals for the past 24 hrs:   BP Pulse SpO2 Height Weight   12/23/19 1103 128/80 84 95 % 1.727 m (5' 8\") 82.6 kg (182 lb)     Body mass index is 27.67 kg/m .     General: age-appropriate appearing male in NAD  HEENT: Head AT/NC, EOMI, CN Grossly intact  Lungs: no respiratory distress, or pursed lip breathing  Heart: No obvious jugular venous distension present  Back: no bony midline tenderness, no CVAT bilaterally.  Abdomen: soft, non-distended, non-tender. No organomegaly  : No costovertebral tenderness bilaterally   Lymph: no palpable inguinal lymphadenopathy.  LE: no edema. "   Musculoskeltal: extremities normal, no peripheral edema  Skin: no suspicious lesions or rashes  Neuro: Alert, oriented, speech and mentation normal;  moving all 4 extremities equally.  Psych: affect and mood normal          Data:   All laboratory data reviewed:    UA RESULTS:  Recent Labs   Lab Test 12/14/19  1518   COLOR Yellow   APPEARANCE Clear   URINEGLC Negative   URINEBILI Negative   URINEKETONE Negative   SG 1.008   UBLD Negative   URINEPH 5.5   PROTEIN Negative   NITRITE Negative   LEUKEST Negative   RBCU 1   WBCU 3      Lab Results   Component Value Date    CR 1.13 12/14/2019        All pertinent imaging reviewed:    I measure the stone at 9.2mm    All imaging studies reviewed by me.  I personally reviewed these imaging films.  A formal report from radiology will follow.    CT ABD/PEL 12/14/19:  FINDINGS: 9 mm stone in the proximal right ureter essentially at the  ureteropelvic junction with mild-moderate proximal hydronephrosis on  the right. Minimal right perinephric stranding. No other renal,  ureteral, or bladder stones There are no dilated loops of small  intestine or large bowel to suggest ileus or obstruction. No free  fluid. No free air. There are no lymph nodes that are abnormal by size  criteria.  There is mild diverticulosis without evidence for  diverticulitis. There are moderate atherosclerotic changes of the  visualized aorta and its branches. There is no evidence of aortic  dissection or aneurysm. The liver, spleen, adrenal glands, kidneys,  and pancreas demonstrate no worrisome focal lesion. Unremarkable  gallbladder. Survey of the visualized bony structures demonstrates no  destructive bony lesions. The visualized lung bases are unremarkable.                                                                      IMPRESSION: 9 mm stone at the right ureteropelvic junction with mild  proximal hydronephrosis and stranding.                 Impression and Plan:   Impression:   84 year old man  with history of TURP now with a RIGHT 9.2 proximal/UPJ stone      Plan:   RIGHT 9.2 proximal/UPJ stone  - We discussed treatment options which include:  ---- Medical Expulsive Therapy (MET): We discussed that based on the stone size and location he would be estimated to have a ~5-10% chance of spontaneous stone passage. We discussed the use of tamsulosin 0.4mg (Flomax)  ---- ESWL: Given the skin-to-stone distance of ~15cm, I would not recommend this approach  ---- URETEROSCOPY: we discussed that there would be 5-10% chance of requiring a staged procedure. We discussed the need for a ureteral stent.  - He would like to proceed with Ureteroscopy   - Orders for surgery placed    Ascending aorta aneurysm and CAD  - Follows with Dr. Collins and recently seen       Thank you for the kind consultation.    Time spent: 30 minutes of which >50% was spent counseling.    Rene Sol MD   Urology  [facility]  [facility] Phone: [phone]  [facility] Phone: [phone]

## 2019-12-23 NOTE — PROGRESS NOTES
Urology Consult History and Physical  [facility]  Name: Cayden Dewitt    MRN: [id number]   YOB: 1935       We were asked to see Cayden Dewitt at the request of ER follow up for evaluation and treatment of RIGHT ureteral stone.        Chief Complaint:   RIGHT ureteral stone    History is obtained from the patient            History of Present Illness:   Cayden Dewitt is a 84 year old male who is being seen for evaluation of RIGHT ureteral stone    Had a recent ear infection.    Has been doing well with no real issues during the day  Has a low back ache at night. This is mainly midline, slightly to the right.   He presented to the ER on 12/14 for this back pain.     Had prior history of kidney stones on the LEFT side.     Had a prostate surgery about 10 years ago - TURP.     (4 or >)  Location: RIGHT back  Quality: ache at night  Severity: mild to moderate   Duration: several weeks           Past Medical History:     Past Medical History:   Diagnosis Date     Ascending aorta dilatation (H)      Benign essential HTN      CAD (coronary artery disease)     11/29/17- ASA 3.5x16mm Promus to Ramux and ASA [id number] Promus to prox PDA     Hyperlipidemia      NSTEMI (non-ST elevated myocardial infarction) (H) 11/2017     90% lesion in the ramus stented with a 3.5 x 16 mm Promus Premier drug-eluting stent as well as a 90% PDA lesion stented with a 2.25 x 28 mm Promus Premier drug-eluting stent.LMA30%, 70% ostial Dl small vessel  treat medically. [id number] 100% The LAD was 30% diseased.  LV and diastolic pressure was 20     Type 2 diabetes mellitus without complications (H)      Uncomplicated asthma             Past Surgical History:     Past Surgical History:   Procedure Laterality Date     CORONARY ANGIOGRAPHY ADULT ORDER  11/29/2017 11/29/17- ASA to Ramus, ASA to PDA, Lma 30%, Lad-mild 30, [id number] 70% ostial, D2 small 100%, Lcx mild     CYSTOSCOPY       GENITOURINARY SURGERY      prostate      PROSTATE SURGERY              Social History:     Social History     Tobacco Use     Smoking status: Never Smoker     Smokeless tobacco: Never Used   Substance Use Topics     Alcohol use: No       History   Smoking Status     Never Smoker   Smokeless Tobacco     Never Used            Family History:     Family History   Problem Relation Age of Onset     Alzheimer Disease Mother               Allergies:     Allergies   Allergen Reactions     Ciprofloxacin      Contrast Dye      Facial and lip swelling     Iodine             Medications:     Current Outpatient Medications   Medication Sig     amLODIPine (NORVASC) 5 MG tablet Take 1 tablet (5 mg) by mouth daily     aspirin EC 81 MG EC tablet Take 1 tablet (81 mg) by mouth daily     carvedilol (COREG) 6.25 MG tablet Take 2 tablets (12.5 mg) by mouth 2 times daily (with meals)     hydrALAZINE (APRESOLINE) 25 MG tablet Take 1 tablet (25 mg) by mouth 3 times daily     losartan (COZAAR) 100 MG tablet Take 1 tablet (100 mg) by mouth daily     metFORMIN (GLUCOPHAGE) 1000 MG tablet Take 1 tablet (1,000 mg) by mouth 2 times daily (with meals)     Multiple Vitamins-Minerals (CENTRUM SILVER) per tablet Take 1 tablet by mouth daily     rosuvastatin (CRESTOR) 5 MG tablet Take 0.5 tablets (2.5 mg) by mouth every other day     cloNIDine (CATAPRES) 0.1 MG tablet Take 1 tablet (0.1mg) a day if systolic blood pressure > 160mmHg (Patient not taking: Reported on 12/23/2019)     CLONIDINE HCL PO Take 0.05 mg by mouth 3 times daily Take 0.05mg twice welsh 11/8 and 11/9, then take 0.05mg once daily on 11/10 and 11/11, then stop taking on 11/12.       HYDROcodone-acetaminophen (NORCO) 5-325 MG tablet Take 1-2 tablets by mouth every 6 hours as needed for severe pain (Patient not taking: Reported on 12/23/2019)     HYDROcodone-acetaminophen (NORCO) 5-325 MG tablet Take 1-2 tablets every 6 hours as needed for pain.  DO NOT TAKE WITH ANY OTHER MEDICATIONS CONTAINING TYLENOL (ACETAMINOPHEN)  "(Patient not taking: Reported on 12/23/2019)     nitroGLYcerin (NITROSTAT) 0.4 MG sublingual tablet For chest pain place 1 tablet under the tongue every 5 minutes for 3 doses. If symptoms persist 5 minutes after 1st dose call 911. (Patient not taking: Reported on 12/23/2019)     ondansetron (ZOFRAN ODT) 4 MG ODT tab Take 1 tablet (4 mg) by mouth every 8 hours as needed for nausea (Patient not taking: Reported on 12/23/2019)     tamsulosin (FLOMAX) 0.4 MG capsule Take 1 capsule (0.4 mg) by mouth daily for 10 doses (Patient not taking: Reported on 12/23/2019)     No current facility-administered medications for this visit.              Review of Systems:     Skin: negative  Eyes: negative  Ears/Nose/Throat: negative  Respiratory: No shortness of breath, dyspnea on exertion, cough, or hemoptysis  Cardiovascular: negative  Gastrointestinal: negative  Genitourinary: as above  Musculoskeletal: negative  Neurologic: negative  Psychiatric: negative  Hematologic/Lymphatic/Immunologic: negative  Endocrine: negative          Physical Exam:     Patient Vitals for the past 24 hrs:   BP Pulse SpO2 Height Weight   12/23/19 1103 128/80 84 95 % 1.727 m (5' 8\") 82.6 kg (182 lb)     Body mass index is 27.67 kg/m .     General: age-appropriate appearing male in NAD  HEENT: Head AT/NC, EOMI, CN Grossly intact  Lungs: no respiratory distress, or pursed lip breathing  Heart: No obvious jugular venous distension present  Back: no bony midline tenderness, no CVAT bilaterally.  Abdomen: soft, non-distended, non-tender. No organomegaly  : No costovertebral tenderness bilaterally   Lymph: no palpable inguinal lymphadenopathy.  LE: no edema.   Musculoskeltal: extremities normal, no peripheral edema  Skin: no suspicious lesions or rashes  Neuro: Alert, oriented, speech and mentation normal;  moving all 4 extremities equally.  Psych: affect and mood normal          Data:   All laboratory data reviewed:    UA RESULTS:  Recent Labs   Lab Test " 12/14/19  1518   COLOR Yellow   APPEARANCE Clear   URINEGLC Negative   URINEBILI Negative   URINEKETONE Negative   SG 1.008   UBLD Negative   URINEPH 5.5   PROTEIN Negative   NITRITE Negative   LEUKEST Negative   RBCU 1   WBCU 3      Lab Results   Component Value Date    CR 1.13 12/14/2019        All pertinent imaging reviewed:    I measure the stone at 9.2mm    All imaging studies reviewed by me.  I personally reviewed these imaging films.  A formal report from radiology will follow.    CT ABD/PEL 12/14/19:  FINDINGS: 9 mm stone in the proximal right ureter essentially at the  ureteropelvic junction with mild-moderate proximal hydronephrosis on  the right. Minimal right perinephric stranding. No other renal,  ureteral, or bladder stones There are no dilated loops of small  intestine or large bowel to suggest ileus or obstruction. No free  fluid. No free air. There are no lymph nodes that are abnormal by size  criteria.  There is mild diverticulosis without evidence for  diverticulitis. There are moderate atherosclerotic changes of the  visualized aorta and its branches. There is no evidence of aortic  dissection or aneurysm. The liver, spleen, adrenal glands, kidneys,  and pancreas demonstrate no worrisome focal lesion. Unremarkable  gallbladder. Survey of the visualized bony structures demonstrates no  destructive bony lesions. The visualized lung bases are unremarkable.                                                                      IMPRESSION: 9 mm stone at the right ureteropelvic junction with mild  proximal hydronephrosis and stranding.                 Impression and Plan:   Impression:   84 year old man with history of TURP now with a RIGHT 9.2 proximal/UPJ stone      Plan:   RIGHT 9.2 proximal/UPJ stone  - We discussed treatment options which include:  ---- Medical Expulsive Therapy (MET): We discussed that based on the stone size and location he would be estimated to have a ~5-10% chance of spontaneous  stone passage. We discussed the use of tamsulosin 0.4mg (Flomax)  ---- ESWL: Given the skin-to-stone distance of ~15cm, I would not recommend this approach  ---- URETEROSCOPY: we discussed that there would be 5-10% chance of requiring a staged procedure. We discussed the need for a ureteral stent.  - He would like to proceed with Ureteroscopy   - Orders for surgery placed    Ascending aorta aneurysm and CAD  - Follows with Dr. Collins and recently seen       Thank you for the kind consultation.    Time spent: 30 minutes of which >50% was spent counseling.    Rene Sol MD   Urology  [facility]  [facility] Phone: [phone]  [facility] Phone: [phone]

## 2019-12-23 NOTE — NURSING NOTE
Chief Complaint   Patient presents with     Kidney Problem     Pt here for kidney stone     Marixa Narvaez, CMA

## 2020-01-02 ENCOUNTER — TELEPHONE (OUTPATIENT)
Dept: UROLOGY | Facility: CLINIC | Age: 85
End: 2020-01-02

## 2020-01-02 NOTE — TELEPHONE ENCOUNTER
[facility] Call Center    Phone Message    May a detailed message be left on voicemail: yes    Reason for Call: Medication Question or concern regarding medication   Prescription Clarification  Name of Medication: metFORMIN (GLUCOPHAGE) 1000 MG tablet    Prescribing Provider: Moise Russo MD   Pharmacy: [facility] [id number] [place] [id number]   What on the order needs clarification? Pt has surgery coming up and needs clarity of when he can continue taking metformin until.  Please call ASAP due to short timeline.          Action Taken: Message routed to:  [facility] ([facility]): urology

## 2020-01-03 ENCOUNTER — APPOINTMENT (OUTPATIENT)
Dept: GENERAL RADIOLOGY | Facility: CLINIC | Age: 85
End: 2020-01-03
Attending: UROLOGY
Payer: MEDICARE

## 2020-01-03 ENCOUNTER — ANESTHESIA EVENT (OUTPATIENT)
Dept: SURGERY | Facility: CLINIC | Age: 85
End: 2020-01-03
Payer: MEDICARE

## 2020-01-03 ENCOUNTER — HOSPITAL ENCOUNTER (OUTPATIENT)
Facility: CLINIC | Age: 85
Discharge: HOME | End: 2020-01-03
Attending: UROLOGY | Admitting: UROLOGY
Payer: MEDICARE

## 2020-01-03 ENCOUNTER — ANESTHESIA (OUTPATIENT)
Dept: SURGERY | Facility: CLINIC | Age: 85
End: 2020-01-03
Payer: MEDICARE

## 2020-01-03 VITALS
WEIGHT: 187.7 LBS | RESPIRATION RATE: 16 BRPM | SYSTOLIC BLOOD PRESSURE: 121 MMHG | DIASTOLIC BLOOD PRESSURE: 76 MMHG | HEART RATE: 67 BPM | TEMPERATURE: 95.7 F | HEIGHT: 69 IN | OXYGEN SATURATION: 96 % | BODY MASS INDEX: 27.8 KG/M2

## 2020-01-03 DIAGNOSIS — N20.1 RIGHT URETERAL STONE: Primary | ICD-10-CM

## 2020-01-03 LAB — GLUCOSE BLDC GLUCOMTR-MCNC: 150 MG/DL (ref 70–99)

## 2020-01-03 PROCEDURE — 40000170 ZZH STATISTIC PRE-PROCEDURE ASSESSMENT II: Performed by: UROLOGY

## 2020-01-03 PROCEDURE — 52356 CYSTO/URETERO W/LITHOTRIPSY: CPT | Mod: RT | Performed by: UROLOGY

## 2020-01-03 PROCEDURE — 71000013 ZZH RECOVERY PHASE 1 LEVEL 1 EA ADDTL HR: Performed by: UROLOGY

## 2020-01-03 PROCEDURE — 25000128 H RX IP 250 OP 636: Performed by: NURSE ANESTHETIST, CERTIFIED REGISTERED

## 2020-01-03 PROCEDURE — 82365 CALCULUS SPECTROSCOPY: CPT | Performed by: UROLOGY

## 2020-01-03 PROCEDURE — C2617 STENT, NON-COR, TEM W/O DEL: HCPCS | Performed by: UROLOGY

## 2020-01-03 PROCEDURE — 27210794 ZZH OR GENERAL SUPPLY STERILE: Performed by: UROLOGY

## 2020-01-03 PROCEDURE — 25000128 H RX IP 250 OP 636: Performed by: ANESTHESIOLOGY

## 2020-01-03 PROCEDURE — 71000012 ZZH RECOVERY PHASE 1 LEVEL 1 FIRST HR: Performed by: UROLOGY

## 2020-01-03 PROCEDURE — 36000056 ZZH SURGERY LEVEL 3 1ST 30 MIN: Performed by: UROLOGY

## 2020-01-03 PROCEDURE — 25000128 H RX IP 250 OP 636: Performed by: UROLOGY

## 2020-01-03 PROCEDURE — 40000277 XR SURGERY CARM FLUORO LESS THAN 5 MIN W STILLS

## 2020-01-03 PROCEDURE — 36000058 ZZH SURGERY LEVEL 3 EA 15 ADDTL MIN: Performed by: UROLOGY

## 2020-01-03 PROCEDURE — 25000125 ZZHC RX 250: Performed by: NURSE ANESTHETIST, CERTIFIED REGISTERED

## 2020-01-03 PROCEDURE — C1769 GUIDE WIRE: HCPCS | Performed by: UROLOGY

## 2020-01-03 PROCEDURE — 88300 SURGICAL PATH GROSS: CPT | Mod: 26 | Performed by: UROLOGY

## 2020-01-03 PROCEDURE — 25800030 ZZH RX IP 258 OP 636: Performed by: NURSE ANESTHETIST, CERTIFIED REGISTERED

## 2020-01-03 PROCEDURE — 37000008 ZZH ANESTHESIA TECHNICAL FEE, 1ST 30 MIN: Performed by: UROLOGY

## 2020-01-03 PROCEDURE — 82962 GLUCOSE BLOOD TEST: CPT

## 2020-01-03 PROCEDURE — 71000027 ZZH RECOVERY PHASE 2 EACH 15 MINS: Performed by: UROLOGY

## 2020-01-03 PROCEDURE — 25000566 ZZH SEVOFLURANE, EA 15 MIN: Performed by: UROLOGY

## 2020-01-03 PROCEDURE — 25800025 ZZH RX 258: Performed by: UROLOGY

## 2020-01-03 PROCEDURE — 25500064 ZZH RX 255 OP 636: Performed by: UROLOGY

## 2020-01-03 PROCEDURE — 74420 UROGRAPHY RTRGR +-KUB: CPT | Mod: 26 | Performed by: UROLOGY

## 2020-01-03 PROCEDURE — 37000009 ZZH ANESTHESIA TECHNICAL FEE, EACH ADDTL 15 MIN: Performed by: UROLOGY

## 2020-01-03 PROCEDURE — 25000125 ZZHC RX 250: Performed by: UROLOGY

## 2020-01-03 PROCEDURE — 88300 SURGICAL PATH GROSS: CPT | Performed by: UROLOGY

## 2020-01-03 DEVICE — STENT URETERAL CONTOUR SOFT PERCUFLEX 6FRX24CM: Type: IMPLANTABLE DEVICE | Site: URETER | Status: FUNCTIONAL

## 2020-01-03 RX ORDER — ONDANSETRON 2 MG/ML
4 INJECTION INTRAMUSCULAR; INTRAVENOUS EVERY 30 MIN PRN
Status: DISCONTINUED | OUTPATIENT
Start: 2020-01-03 | End: 2020-01-03 | Stop reason: HOSPADM

## 2020-01-03 RX ORDER — MAGNESIUM HYDROXIDE 1200 MG/15ML
LIQUID ORAL PRN
Status: DISCONTINUED | OUTPATIENT
Start: 2020-01-03 | End: 2020-01-03 | Stop reason: HOSPADM

## 2020-01-03 RX ORDER — PROPOFOL 10 MG/ML
INJECTION, EMULSION INTRAVENOUS PRN
Status: DISCONTINUED | OUTPATIENT
Start: 2020-01-03 | End: 2020-01-03

## 2020-01-03 RX ORDER — SULFAMETHOXAZOLE/TRIMETHOPRIM 800-160 MG
1 TABLET ORAL ONCE
Qty: 1 TABLET | Refills: 0 | Status: SHIPPED | OUTPATIENT
Start: 2020-01-03 | End: 2020-01-03

## 2020-01-03 RX ORDER — PROPOFOL 10 MG/ML
INJECTION, EMULSION INTRAVENOUS CONTINUOUS PRN
Status: DISCONTINUED | OUTPATIENT
Start: 2020-01-03 | End: 2020-01-03

## 2020-01-03 RX ORDER — ONDANSETRON 4 MG/1
4 TABLET, ORALLY DISINTEGRATING ORAL EVERY 30 MIN PRN
Status: DISCONTINUED | OUTPATIENT
Start: 2020-01-03 | End: 2020-01-03 | Stop reason: HOSPADM

## 2020-01-03 RX ORDER — ONDANSETRON 2 MG/ML
INJECTION INTRAMUSCULAR; INTRAVENOUS PRN
Status: DISCONTINUED | OUTPATIENT
Start: 2020-01-03 | End: 2020-01-03

## 2020-01-03 RX ORDER — CEFAZOLIN SODIUM 2 G/100ML
2 INJECTION, SOLUTION INTRAVENOUS
Status: COMPLETED | OUTPATIENT
Start: 2020-01-03 | End: 2020-01-03

## 2020-01-03 RX ORDER — MEPERIDINE HYDROCHLORIDE 25 MG/ML
12.5 INJECTION INTRAMUSCULAR; INTRAVENOUS; SUBCUTANEOUS
Status: DISCONTINUED | OUTPATIENT
Start: 2020-01-03 | End: 2020-01-03 | Stop reason: HOSPADM

## 2020-01-03 RX ORDER — SODIUM CHLORIDE, SODIUM LACTATE, POTASSIUM CHLORIDE, CALCIUM CHLORIDE 600; 310; 30; 20 MG/100ML; MG/100ML; MG/100ML; MG/100ML
INJECTION, SOLUTION INTRAVENOUS CONTINUOUS PRN
Status: DISCONTINUED | OUTPATIENT
Start: 2020-01-03 | End: 2020-01-03

## 2020-01-03 RX ORDER — EPHEDRINE SULFATE 50 MG/ML
INJECTION, SOLUTION INTRAMUSCULAR; INTRAVENOUS; SUBCUTANEOUS PRN
Status: DISCONTINUED | OUTPATIENT
Start: 2020-01-03 | End: 2020-01-03

## 2020-01-03 RX ORDER — ALBUTEROL SULFATE 0.83 MG/ML
2.5 SOLUTION RESPIRATORY (INHALATION) EVERY 4 HOURS PRN
Status: DISCONTINUED | OUTPATIENT
Start: 2020-01-03 | End: 2020-01-03 | Stop reason: HOSPADM

## 2020-01-03 RX ORDER — NALOXONE HYDROCHLORIDE 0.4 MG/ML
.1-.4 INJECTION, SOLUTION INTRAMUSCULAR; INTRAVENOUS; SUBCUTANEOUS
Status: DISCONTINUED | OUTPATIENT
Start: 2020-01-03 | End: 2020-01-03 | Stop reason: HOSPADM

## 2020-01-03 RX ORDER — OXYCODONE HYDROCHLORIDE 5 MG/1
5 TABLET ORAL EVERY 6 HOURS PRN
Qty: 9 TABLET | Refills: 0 | Status: SHIPPED | OUTPATIENT
Start: 2020-01-03

## 2020-01-03 RX ORDER — FENTANYL CITRATE 50 UG/ML
INJECTION, SOLUTION INTRAMUSCULAR; INTRAVENOUS PRN
Status: DISCONTINUED | OUTPATIENT
Start: 2020-01-03 | End: 2020-01-03

## 2020-01-03 RX ORDER — CEFAZOLIN SODIUM 1 G/3ML
1 INJECTION, POWDER, FOR SOLUTION INTRAMUSCULAR; INTRAVENOUS SEE ADMIN INSTRUCTIONS
Status: DISCONTINUED | OUTPATIENT
Start: 2020-01-03 | End: 2020-01-03 | Stop reason: HOSPADM

## 2020-01-03 RX ORDER — TAMSULOSIN HYDROCHLORIDE 0.4 MG/1
0.4 CAPSULE ORAL DAILY
Qty: 10 CAPSULE | Refills: 0 | Status: SHIPPED | OUTPATIENT
Start: 2020-01-03

## 2020-01-03 RX ORDER — KETOROLAC TROMETHAMINE 30 MG/ML
INJECTION, SOLUTION INTRAMUSCULAR; INTRAVENOUS PRN
Status: DISCONTINUED | OUTPATIENT
Start: 2020-01-03 | End: 2020-01-03

## 2020-01-03 RX ORDER — HYDROMORPHONE HYDROCHLORIDE 1 MG/ML
.3-.5 INJECTION, SOLUTION INTRAMUSCULAR; INTRAVENOUS; SUBCUTANEOUS EVERY 10 MIN PRN
Status: DISCONTINUED | OUTPATIENT
Start: 2020-01-03 | End: 2020-01-03 | Stop reason: HOSPADM

## 2020-01-03 RX ORDER — HYDRALAZINE HYDROCHLORIDE 20 MG/ML
2.5-5 INJECTION INTRAMUSCULAR; INTRAVENOUS EVERY 10 MIN PRN
Status: DISCONTINUED | OUTPATIENT
Start: 2020-01-03 | End: 2020-01-03 | Stop reason: HOSPADM

## 2020-01-03 RX ORDER — FENTANYL CITRATE 0.05 MG/ML
25-50 INJECTION, SOLUTION INTRAMUSCULAR; INTRAVENOUS
Status: DISCONTINUED | OUTPATIENT
Start: 2020-01-03 | End: 2020-01-03 | Stop reason: HOSPADM

## 2020-01-03 RX ORDER — GLYCOPYRROLATE 0.2 MG/ML
INJECTION, SOLUTION INTRAMUSCULAR; INTRAVENOUS PRN
Status: DISCONTINUED | OUTPATIENT
Start: 2020-01-03 | End: 2020-01-03

## 2020-01-03 RX ORDER — SODIUM CHLORIDE, SODIUM LACTATE, POTASSIUM CHLORIDE, CALCIUM CHLORIDE 600; 310; 30; 20 MG/100ML; MG/100ML; MG/100ML; MG/100ML
INJECTION, SOLUTION INTRAVENOUS CONTINUOUS
Status: DISCONTINUED | OUTPATIENT
Start: 2020-01-03 | End: 2020-01-03 | Stop reason: HOSPADM

## 2020-01-03 RX ORDER — IOPAMIDOL 612 MG/ML
INJECTION, SOLUTION INTRAVASCULAR PRN
Status: DISCONTINUED | OUTPATIENT
Start: 2020-01-03 | End: 2020-01-03 | Stop reason: HOSPADM

## 2020-01-03 RX ORDER — NEOSTIGMINE METHYLSULFATE 1 MG/ML
VIAL (ML) INJECTION PRN
Status: DISCONTINUED | OUTPATIENT
Start: 2020-01-03 | End: 2020-01-03

## 2020-01-03 RX ORDER — LIDOCAINE HYDROCHLORIDE 20 MG/ML
INJECTION, SOLUTION INFILTRATION; PERINEURAL PRN
Status: DISCONTINUED | OUTPATIENT
Start: 2020-01-03 | End: 2020-01-03

## 2020-01-03 RX ORDER — OXYBUTYNIN CHLORIDE 5 MG/1
5 TABLET, EXTENDED RELEASE ORAL DAILY
Qty: 10 TABLET | Refills: 0 | Status: SHIPPED | OUTPATIENT
Start: 2020-01-03 | End: 2020-01-13

## 2020-01-03 RX ORDER — DEXAMETHASONE SODIUM PHOSPHATE 4 MG/ML
INJECTION, SOLUTION INTRA-ARTICULAR; INTRALESIONAL; INTRAMUSCULAR; INTRAVENOUS; SOFT TISSUE PRN
Status: DISCONTINUED | OUTPATIENT
Start: 2020-01-03 | End: 2020-01-03

## 2020-01-03 RX ADMIN — SODIUM CHLORIDE, POTASSIUM CHLORIDE, SODIUM LACTATE AND CALCIUM CHLORIDE: 600; 310; 30; 20 INJECTION, SOLUTION INTRAVENOUS at 11:41

## 2020-01-03 RX ADMIN — LIDOCAINE HYDROCHLORIDE 100 MG: 20 INJECTION, SOLUTION INFILTRATION; PERINEURAL at 11:47

## 2020-01-03 RX ADMIN — ONDANSETRON 4 MG: 2 INJECTION INTRAMUSCULAR; INTRAVENOUS at 12:39

## 2020-01-03 RX ADMIN — FENTANYL CITRATE 25 MCG: 0.05 INJECTION, SOLUTION INTRAMUSCULAR; INTRAVENOUS at 13:32

## 2020-01-03 RX ADMIN — Medication 5 MG: at 11:53

## 2020-01-03 RX ADMIN — CEFAZOLIN SODIUM 2 G: 2 INJECTION, SOLUTION INTRAVENOUS at 11:47

## 2020-01-03 RX ADMIN — PHENYLEPHRINE HYDROCHLORIDE 100 MCG: 10 INJECTION INTRAVENOUS at 12:04

## 2020-01-03 RX ADMIN — PROPOFOL 150 MG: 10 INJECTION, EMULSION INTRAVENOUS at 11:47

## 2020-01-03 RX ADMIN — PHENYLEPHRINE HYDROCHLORIDE 100 MCG: 10 INJECTION INTRAVENOUS at 12:11

## 2020-01-03 RX ADMIN — PHENYLEPHRINE HYDROCHLORIDE 100 MCG: 10 INJECTION INTRAVENOUS at 12:31

## 2020-01-03 RX ADMIN — PHENYLEPHRINE HYDROCHLORIDE 100 MCG: 10 INJECTION INTRAVENOUS at 11:54

## 2020-01-03 RX ADMIN — PROPOFOL 20 MCG/KG/MIN: 10 INJECTION, EMULSION INTRAVENOUS at 11:47

## 2020-01-03 RX ADMIN — DEXAMETHASONE SODIUM PHOSPHATE 4 MG: 4 INJECTION, SOLUTION INTRA-ARTICULAR; INTRALESIONAL; INTRAMUSCULAR; INTRAVENOUS; SOFT TISSUE at 12:20

## 2020-01-03 RX ADMIN — KETOROLAC TROMETHAMINE 15 MG: 30 INJECTION, SOLUTION INTRAMUSCULAR at 12:44

## 2020-01-03 RX ADMIN — FENTANYL CITRATE 50 MCG: 50 INJECTION, SOLUTION INTRAMUSCULAR; INTRAVENOUS at 11:41

## 2020-01-03 RX ADMIN — PHENYLEPHRINE HYDROCHLORIDE 100 MCG: 10 INJECTION INTRAVENOUS at 11:57

## 2020-01-03 RX ADMIN — GLYCOPYRROLATE 0.6 MG: 0.2 INJECTION, SOLUTION INTRAMUSCULAR; INTRAVENOUS at 12:46

## 2020-01-03 RX ADMIN — Medication 5 MG: at 12:10

## 2020-01-03 RX ADMIN — Medication 5 MG: at 12:31

## 2020-01-03 RX ADMIN — NEOSTIGMINE METHYLSULFATE 4 MG: 1 INJECTION, SOLUTION INTRAVENOUS at 12:46

## 2020-01-03 RX ADMIN — PHENYLEPHRINE HYDROCHLORIDE 100 MCG: 10 INJECTION INTRAVENOUS at 11:52

## 2020-01-03 RX ADMIN — Medication 5 MG: at 11:57

## 2020-01-03 RX ADMIN — PHENYLEPHRINE HYDROCHLORIDE 100 MCG: 10 INJECTION INTRAVENOUS at 11:59

## 2020-01-03 ASSESSMENT — ENCOUNTER SYMPTOMS
DYSRHYTHMIAS: 0
SEIZURES: 0

## 2020-01-03 ASSESSMENT — COPD QUESTIONNAIRES: COPD: 0

## 2020-01-03 ASSESSMENT — MIFFLIN-ST. JEOR: SCORE: 1531.78

## 2020-01-03 ASSESSMENT — LIFESTYLE VARIABLES: TOBACCO_USE: 0

## 2020-01-03 NOTE — ANESTHESIA PREPROCEDURE EVALUATION
Anesthesia Pre-Procedure Evaluation    Patient: Cayden Dewitt   MRN: [id number] : 1935          Preoperative Diagnosis: Right ureteral stone [N20.1]    Procedure(s):  CYSTOSCOPY WITH RIGHT RETROGRADE PYELOGRAM, RIGHT URETEROSCOPY WITH LASER LITHOTRIPSY AND BASKET REMOVAL OF STONE, RIGHT URETERAL STENT PLACEMENT    Past Medical History:   Diagnosis Date     Ascending aorta dilatation (H)      Benign essential HTN      CAD (coronary artery disease)     17- ASA 3.5x16mm Promus to Ramux and ASA [id number] Promus to prox PDA     Hyperlipidemia      NSTEMI (non-ST elevated myocardial infarction) (H) 2017     90% lesion in the ramus stented with a 3.5 x 16 mm Promus Premier drug-eluting stent as well as a 90% PDA lesion stented with a 2.25 x 28 mm Promus Premier drug-eluting stent.LMA30%, 70% ostial Dl small vessel  treat medically. [id number] 100% The LAD was 30% diseased.  LV and diastolic pressure was 20     Type 2 diabetes mellitus without complications (H)      Uncomplicated asthma      Past Surgical History:   Procedure Laterality Date     CORONARY ANGIOGRAPHY ADULT ORDER  2017- ASA to Ramus, ASA to PDA, Lma 30%, Lad-mild 30, V2zyxhr 70% ostial, D2 small 100%, Lcx mild     CYSTOSCOPY       GENITOURINARY SURGERY      prostate     PROSTATE SURGERY       Allergies   Allergen Reactions     Ciprofloxacin      Contrast Dye      Facial and lip swelling     Iodine      Prior to Admission medications    Medication Sig Start Date End Date Taking? Authorizing Provider   amLODIPine (NORVASC) 5 MG tablet Take 1 tablet (5 mg) by mouth daily 18   Laure Hughes, APRN CNP   aspirin EC 81 MG EC tablet Take 1 tablet (81 mg) by mouth daily 17   Moise Russo MD   carvedilol (COREG) 6.25 MG tablet Take 2 tablets (12.5 mg) by mouth 2 times daily (with meals) 19   Magdy Collins MD   cloNIDine (CATAPRES) 0.1 MG tablet Take 1 tablet (0.1mg) a day if systolic blood  pressure > 160mmHg  Patient not taking: Reported on 12/23/2019 11/12/18   Magdy Collins MD   CLONIDINE HCL PO Take 0.05 mg by mouth 3 times daily Take 0.05mg twice welsh 11/8 and 11/9, then take 0.05mg once daily on 11/10 and 11/11, then stop taking on 11/12.      Unknown, Entered By History   hydrALAZINE (APRESOLINE) 25 MG tablet Take 1 tablet (25 mg) by mouth 3 times daily 11/12/18   Magdy Collins MD   HYDROcodone-acetaminophen (NORCO) 5-325 MG tablet Take 1-2 tablets by mouth every 6 hours as needed for severe pain  Patient not taking: Reported on 12/23/2019 12/14/19   Ezekiel Ferrer MD   HYDROcodone-acetaminophen (NORCO) 5-325 MG tablet Take 1-2 tablets every 6 hours as needed for pain.  DO NOT TAKE WITH ANY OTHER MEDICATIONS CONTAINING TYLENOL (ACETAMINOPHEN)  Patient not taking: Reported on 12/23/2019 2/8/19   Apolinar Priest MD   losartan (COZAAR) 100 MG tablet Take 1 tablet (100 mg) by mouth daily 10/30/18   Laure Hughes APRN CNP   metFORMIN (GLUCOPHAGE) 1000 MG tablet Take 1 tablet (1,000 mg) by mouth 2 times daily (with meals) 11/30/17   Moise Russo MD   Multiple Vitamins-Minerals (CENTRUM SILVER) per tablet Take 1 tablet by mouth daily    Unknown, Entered By History   nitroGLYcerin (NITROSTAT) 0.4 MG sublingual tablet For chest pain place 1 tablet under the tongue every 5 minutes for 3 doses. If symptoms persist 5 minutes after 1st dose call 911.  Patient not taking: Reported on 12/23/2019 11/30/17   Moise Russo MD   ondansetron (ZOFRAN ODT) 4 MG ODT tab Take 1 tablet (4 mg) by mouth every 8 hours as needed for nausea  Patient not taking: Reported on 12/23/2019 12/14/19   Ezekiel Ferrer MD   rosuvastatin (CRESTOR) 5 MG tablet Take 0.5 tablets (2.5 mg) by mouth every other day 5/30/19   Magdy Collins MD   tamsulosin (FLOMAX) 0.4 MG capsule Take 1 capsule (0.4 mg) by mouth daily for 10 doses  Patient not taking: Reported on 12/23/2019 12/14/19 12/24/19   Ezekiel Ferrer MD     Cath 2017: SUMMARY:   Two vessel significant obstructive CAD- RCA and Ramus  -90% stenosis of the proximal segment of Ramus  -80% stenosis of proximal PDA  -70% stenosis of the ostium and proximal segment of small size  diagonal 1       Successful deployment of a drug eluting stents  - 3.5 x16 mm Promus Premier drug eluting stent to proximal Ramus  -  2.25 x28 mm  Promus Premier drug eluting stent to proximal PDA,  with post dilation with 2.5 mm NC balloon                                              ECG: Sinus rhythm with 1st degree A-V block with Premature atrial complexes  Non-specific intra-ventricular conduction block  Cannot rule out Septal infarct , age undetermined  Abnormal ECG  When compared with ECG of 29-NOV-2017 08:05, (unconfirmed)  Premature ventricular complexes are no longer Present  Non-specific intra-ventricular conduction block has replaced Right bundle branch block  Minimal criteria for Septal infarct are now Present     ECHO: Interpretation Summary     The left ventricle is normal in size. The visual ejection fraction is  estimated at 55-60% and the biplane calculatd LVEF = 54% with no regional wall  motion abnormalities noted.  Grade I or early diastolic dysfunction.  There is moderate trileaflet aortic sclerosis. There is no hemodynamically  significant valvular aortic stenosis but there is mild (1+) aortic  regurgitation.  There is trace to mild tricuspid regurgitation. Right ventricular systolic  pressure is normal.  The inferior vena cava (IVC) is not dilated.  The aortic Sinus(es) of Valsalva are mildly dilated at 4.0 cm. (The upper  limit of normal is 3.7cm for aortic root diameter.)       Anesthesia Evaluation     .             ROS/MED HX    ENT/Pulmonary:     (+)asthma , . .   (-) tobacco use, COPD and sleep apnea   Neurologic:      (-) seizures, CVA and migraines   Cardiovascular: Comment: Aortic dilation      (+) Dyslipidemia, hypertension--CAD, --. : . . .  ":. .      (-) BLACKMON, arrhythmias and valvular problems/murmurs   METS/Exercise Tolerance:  >4 METS   Hematologic:        (-) history of blood clots, anemia and other hematologic disorder   Musculoskeletal:   (+)  other musculoskeletal- chronic back pain     (-) arthritis   GI/Hepatic:        (-) GERD and liver disease   Renal/Genitourinary:     (+) Nephrolithiasis , BPH,    (-) renal disease   Endo:     (+) type II DM .   (-) Type I DM and obesity   Psychiatric:        (-) psychiatric history   Infectious Disease:        (-) Recent Fever   Malignancy:         Other:                          Physical Exam  Normal systems: cardiovascular, pulmonary and dental    Airway   Mallampati: II  TM distance: >3 FB  Neck ROM: full    Dental   (+) caps    Cardiovascular   Rhythm and rate: regular and normal  (-) no murmur    Pulmonary    breath sounds clear to auscultation            Lab Results   Component Value Date    WBC 9.4 12/14/2019    HGB 15.0 12/14/2019    HCT 44.5 12/14/2019     12/14/2019     12/14/2019    POTASSIUM 4.1 12/14/2019    CHLORIDE 102 12/14/2019    CO2 31 12/14/2019    BUN 19 12/14/2019    CR 1.13 12/14/2019     (H) 12/14/2019    MACIEJ 9.7 12/14/2019    ALBUMIN 3.6 02/08/2019    PROTTOTAL 7.2 02/08/2019    ALT 21 02/08/2019    AST 14 02/08/2019    ALKPHOS 54 02/08/2019    BILITOTAL 0.6 02/08/2019    LIPASE 91 02/08/2019       Preop Vitals  BP Readings from Last 3 Encounters:   12/23/19 128/80   12/14/19 (!) 149/75   02/08/19 126/79    Pulse Readings from Last 3 Encounters:   12/23/19 84   12/14/19 80   02/08/19 71      Resp Readings from Last 3 Encounters:   12/14/19 18   02/08/19 20   11/30/17 16    SpO2 Readings from Last 3 Encounters:   12/23/19 95%   12/14/19 98%   02/08/19 94%      Temp Readings from Last 1 Encounters:   12/14/19 36.7  C (98  F) (Oral)    Ht Readings from Last 1 Encounters:   12/23/19 1.727 m (5' 8\")      Wt Readings from Last 1 Encounters:   12/23/19 82.6 kg (182 lb) " "   Estimated body mass index is 27.67 kg/m  as calculated from the following:    Height as of 12/23/19: 1.727 m (5' 8\").    Weight as of 12/23/19: 82.6 kg (182 lb).       Anesthesia Plan      History & Physical Review      ASA Status:  2 .    NPO Status:  > 8 hours    Plan for General and LMA with Propofol induction. Maintenance will be Balanced.    PONV prophylaxis:  Ondansetron (or other 5HT-3) and Dexamethasone or Solumedrol       Postoperative Care  Postoperative pain management:  Multi-modal analgesia.      Consents  Anesthetic plan, risks, benefits and alternatives discussed with:  Patient..                 Jenifer Young MD  "

## 2020-01-03 NOTE — OP NOTE
OPERATIVE REPORT  DATE OF SURGERY: 01/03/20  LOCATION OF SURGERY: [facility] OR  PREOPERATIVE DIAGNOSIS:  (N20.1) Right ureteral stone  POSTOPERATIVE DIAGNOSIS: (N20.1) Right ureteral stone  START TIME: 12:01 PM  END TIME: 12:48 PM  PROCEDURE PERFORMED:   1. Cystoscopy  2. RIGHT retrograde pyelogram  3. RIGHT ureteroscopy with laser lithotripsy  4. RIGHT ureteroscopy with basketing of stones  5. RIGHT JJ stent placement  6. <1hr physician fluoroscopy time      STAFF SURGEON: Rene Sol MD  ANESTHESIA: General.   ESTIMATED BLOOD LOSS: 2 mL.   DRAINS AND TUBES: RIGHT 6fr x 24cm ureteral stent, 16fr coude catheter  COMPLICATIONS: None.   DISPOSITION: PACU.   SPECIMENS OBTAINED:   ID Type Source Tests Collected by Time Destination   1 : RIGHT URETERAL STONE Calculus/Stone Ureter, Right STONE ANALYSIS Rene Sol MD 1/3/2020 12:22 PM      SIGNIFICANT FINDINGS: Cystoscopy with evidence of prior TURP with nodular regrowth of bilateral lobes and a posterior prostatic urethra tissue bridge. Bladder with severe trabeculations. RIGHT Ureteroscopy with evidence of the large stone in the mid/distal ureter. Stone laser fragmented and all fragments removed. Retrograde Pyelogram with no further evidence of stone. RIGHT ureteral stent placed.      HISTORY OF PRESENT ILLNESS: Mr. Dewitt is a 84 year old man with history of TURP about 10 years ago who presented with back pain and was found to have an obstructing 9.2mm RIGHT ureteral stone. He was counseled on treatment options and elected to proceed with the above surgery.     OPERATION PERFORMED:   Informed consent was obtained and the patient was brought to the operating room where general anesthesia was induced. The patient was given appropriate preoperative antibiotics and positioned supine. The patient was then repositioned in dorsal lithotomy with all pressure points padded. We then performed a timeout, verifying the correct patient's site and procedure to be  "performed.    A 22fr cystoscope was inserted atraumatically into the bladder. Cystoscopy with evidence of prior TURP with nodular regrowth of bilateral lobes and a posterior prostatic urethra tissue bridge. Bladder with severe trabeculations. Complete cystoscopy was performed with no evidence of a stone within the bladder. The RIGHT UO was identified and cannulated with a 0.035 Sensor wire (an angled tip was required) which was advanced up the RIGHT kidney under fluoroscopic guidance. The cystoscope was removed. A semi-rigid ureteroscope was assembled and inserted atraumatically into the bladder. An 0.035 Straight Sensor wire was used to cannulate the UO and guide the semi-rigid scope into the UO in a \"rail-road\" technique. The scope was advanced up the ureter to the level of the mid/distal ureter. The 9.2mm stone was identified. The stone was fragmented with the laser fiber. All fragments >1mm were basketed and removed. A gentle Retrograde Pyelogram was completed with no evidence of stone in the ureter. The scope was removed with complete visualization of the ureter. There was no evidence of ureteral injury. A 6fr x 24cm JJ stent was advanced over the Sensor wire with good curl noted in the renal pelvis and in the bladder fluoroscopically. A 16fr ramirez was placed.   The patient was emerged from anesthesia and recovered in the PACU.      Rene Sol MD   Urology  [facility]  Clinic Phone [phone]     "

## 2020-01-03 NOTE — ANESTHESIA CARE TRANSFER NOTE
Patient: Cayden Dewitt    Procedure(s):  CYSTOSCOPY WITH RIGHT RETROGRADE PYELOGRAM, RIGHT URETEROSCOPY WITH LASER LITHOTRIPSY AND BASKET REMOVAL OF STONE, RIGHT URETERAL STENT PLACEMENT    Diagnosis: Right ureteral stone [N20.1]  Diagnosis Additional Information: No value filed.    Anesthesia Type:   General, LMA     Note:  Airway :Face Mask  Patient transferred to:PACU  Comments: VSS. Airway and IV patent. Patient comfortable. Report to RN. Stable care transfer.  Handoff Report: Identifed the Patient, Identified the Reponsible Provider, Reviewed the pertinent medical history, Discussed the surgical course, Reviewed Intra-OP anesthesia mangement and issues during anesthesia, Set expectations for post-procedure period and Allowed opportunity for questions and acknowledgement of understanding      Vitals: (Last set prior to Anesthesia Care Transfer)    CRNA VITALS  1/3/2020 1229 - 1/3/2020 1307      1/3/2020             Pulse:  84    SpO2:  98 %    Resp Rate (set):  10                Electronically Signed By: THOMAS Montalvo CRNA  January 3, 2020  1:07 PM

## 2020-01-03 NOTE — DISCHARGE INSTRUCTIONS
POSTOPERATIVE INSTRUCTIONS    Diagnosis-------------------------------   RIGHT ureteral stone    Procedure-------------------------------  Procedure(s) (LRB):  CYSTOSCOPY WITH RIGHT RETROGRADE PYELOGRAM, RIGHT URETEROSCOPY WITH LASER LITHOTRIPSY AND BASKET REMOVAL OF STONE, RIGHT URETERAL STENT PLACEMENT (Right)      Findings--------------------------------  Cystoscopy with evidence of prior TURP with nodular regrowth of bilateral lobes and a posterior prostatic urethra tissue bridge. Bladder with severe trabeculations. RIGHT Ureteroscopy with evidence of the large stone in the mid/distal ureter. Stone laser fragmented and all fragments removed. Retrograde Pyelogram with no further evidence of stone. RIGHT ureteral stent placed.     Home-going instructions-----------------         Activity Limitation:     - No driving or operating heavy machinery while on narcotic pain medication.     FOLLOW THESE INSTRUCTIONS AS INDICATED BELOW:  - Observe operative area for signs of excessive bleeding.  - You may shower.  - Increase fluid intake to promote clear urine.  - Resume usual diet as tolerated    What to expect while recovering-----------  - You may experience some intermittent bleeding that makes your urine pink or cherry colored. This is normal.  - However, if you are unable to urinate, passing large amount of clots, have elisa blood in your urine, or have a temperature >101 degrees, call the urology nurse on call, or present to your nearest emergency department.  - You are encouraged to walk daily, and have no activity restrictions.   - A URETERAL STENT has been placed that allows urine to flow unobstructed from your kidney into your bladder.  The stent has a curl in the kidney and a curl in the bladder.  The curl in the bladder can cause some urgency and frequency of urination as well as some mild blood in the urine.  The curl in the kidney can cause some mild flank discomfort.  This may be more noticeable when you  urinate.  A URETERAL STENT is meant to be left in temporarily.  It must be removed or changed no later than 3 months after it's insertion.  If it's not removed it can result in stone overgrowth on the stent that can cause pain, infection, and can be very difficult to remove.      Discharge Medications/instructions:     - Flomax (tamsulosin) to be taken daily until stent is removed    - Oxybutynin 5mg XL (Ditropan XL) to be taken daily until ureteral stent is removed    - Take Tylenol 1000mg every 6 hours for pain    - Take Ibuprofen 600mg every 6 hours as needed for additional pain control    - Take Oxycodone 5mg every 4-6 hours only for break through pain    - Take Colace while taking Oxycodone to prevent constipation      Questions/concerns------------------------  [facility]: [phone]  [facility]: [phone]    Future appointments  You are scheduled for follow up with Dr. Sol on 1/13/20 for ureteral stent removal.    Rene Sol MD         Same Day Surgery Discharge Instructions for  Sedation and General Anesthesia       It's not unusual to feel dizzy, light-headed or faint for up to 24 hours after surgery or while taking pain medication.  If you have these symptoms: sit for a few minutes before standing and have someone assist you when you get up to walk or use the bathroom.      You should rest and relax for the next 24 hours. We recommend you make arrangements to have an adult stay with you for at least 24 hours after your discharge.  Avoid hazardous and strenuous activity.      DO NOT DRIVE any vehicle or operate mechanical equipment for 24 hours following the end of your surgery.  Even though you may feel normal, your reactions may be affected by the medication you have received.      Do not drink alcoholic beverages for 24 hours following surgery.       Slowly progress to your regular diet as you feel able. It's not unusual to feel nauseated and/or vomit after  receiving anesthesia.  If you develop these symptoms, drink clear liquids (apple juice, ginger ale, broth, 7-up, etc. ) until you feel better.  If your nausea and vomiting persists for 24 hours, please notify your surgeon.        All narcotic pain medications, along with inactivity and anesthesia, can cause constipation. Drinking plenty of liquids and increasing fiber intake will help.      For any questions of a medical nature, call your surgeon.      Do not make important decisions for 24 hours.      If you had general anesthesia, you may have a sore throat for a couple of days related to the breathing tube used during surgery.  You may use Cepacol lozenges to help with this discomfort.  If it worsens or if you develop a fever, contact your surgeon.       If you feel your pain is not well managed with the pain medications prescribed by your surgeon, please contact your surgeon's office to let them know so they can address your concerns.         Today you received Toradol, an antiinflammatory medication similar to Ibuprofen.  You should not take other antiinflammatory medication, such as Ibuprofen, Motrin, Advil, Aleve, Naprosyn, etc until 7PM.          Cystoscopy, Holmium Laser with Stent Placement Discharge Instructions    Holmium laser lithotripsy was used to break up your kidney stone(s). It is normal to have visible blood in the urine, burning, urgency and frequency following this procedure. These symptoms may last for a few days to weeks.     Diet:    To help pass stone fragments and clear the blood out of the urine, it is important to drink 6-8 glasses of fluids per day at home - at least 3-4 glasses should be water.       Return to the diet that you were on before the procedure, unless you are given specific diet instructions.    Activity:    Walk short distances and increase as your strength allows.    You may climb stairs.    Do not do strenuous exercise or heavy lifting until approved by surgeon.    Do  not drive while taking narcotic pain medications.    Bathing:    You may take a shower.          Stent information    During surgery, a stent was placed in the ureter.  The ureter is the tube that drains urine from the kidney to the bladder.  The stent is placed to dilate (open) the ureter so the stone fragments can pass easily through the ureter or to decrease ureteral swelling after surgery, or to relieve an obstruction. The stent is made of rubber. The upper end of the stent curls in the kidney while the lower end rests in the bladder.    While the stent is in place you may experience the following symptoms:    Blood and/or small blood clots in urine.    Bladder spasm (frequency and urgency of urination).    Discomfort or aching in the back or side where the stent is.    Burning or discomfort at the end of urine stream.    To decrease these symptoms you should:    Take pain medication as prescribed.    Drink plenty of fluids.    If you experience pain at the end of urination try not emptying your bladder completely.    If having discomfort in back or side, decrease activity.    Call your physician if these signs/symptoms are present:    Pain that is not relieved by a short rest or ordered pain medications.    Temperature at or above 101.0 F or chills.    Inability or difficulty urinating.     Excessive blood in urine.    Any questions or concerns.             **If you have questions or concerns about your procedure,   call Dr. Sol at [phone]**

## 2020-01-03 NOTE — ANESTHESIA POSTPROCEDURE EVALUATION
Patient: Cayden Dewitt    Procedure(s):  CYSTOSCOPY WITH RIGHT RETROGRADE PYELOGRAM, RIGHT URETEROSCOPY WITH LASER LITHOTRIPSY AND BASKET REMOVAL OF STONE, RIGHT URETERAL STENT PLACEMENT    Diagnosis:Right ureteral stone [N20.1]  Diagnosis Additional Information: No value filed.    Anesthesia Type:  General, LMA    Note:  Anesthesia Post Evaluation    Patient location during evaluation: PACU  Patient participation: Able to fully participate in evaluation  Level of consciousness: awake and alert  Pain management: adequate  Airway patency: patent  Cardiovascular status: acceptable  Respiratory status: acceptable  Hydration status: acceptable  PONV: none     Anesthetic complications: None          Last vitals:  Vitals:    01/03/20 1315 01/03/20 1330 01/03/20 1345   BP: 119/77 132/80 115/70   Pulse: 70 73 70   Resp: 18 12 14   Temp: 35.4  C (95.7  F)     SpO2: 99% 95% 93%         Electronically Signed By: Jenifer Young MD  January 3, 2020  2:03 PM

## 2020-01-08 LAB
APPEARANCE STONE: NORMAL
COMPN STONE: NORMAL
NUMBER STONE: NORMAL
SIZE STONE: NORMAL MM
WT STONE: 132 MG

## 2020-01-13 ENCOUNTER — DOCUMENTATION ONLY (OUTPATIENT)
Dept: CARDIOLOGY | Facility: CLINIC | Age: 85
End: 2020-01-13

## 2020-01-13 ENCOUNTER — OFFICE VISIT (OUTPATIENT)
Dept: UROLOGY | Facility: CLINIC | Age: 85
End: 2020-01-13
Payer: MEDICARE

## 2020-01-13 VITALS
DIASTOLIC BLOOD PRESSURE: 80 MMHG | OXYGEN SATURATION: 97 % | WEIGHT: 182 LBS | HEART RATE: 81 BPM | SYSTOLIC BLOOD PRESSURE: 132 MMHG | BODY MASS INDEX: 26.96 KG/M2 | HEIGHT: 69 IN

## 2020-01-13 DIAGNOSIS — N20.0 CALCULUS OF KIDNEY: Primary | ICD-10-CM

## 2020-01-13 DIAGNOSIS — Z46.6 ENCOUNTER FOR REMOVAL OF URETERAL STENT: ICD-10-CM

## 2020-01-13 PROCEDURE — 52310 CYSTOSCOPY AND TREATMENT: CPT | Performed by: UROLOGY

## 2020-01-13 RX ORDER — LIDOCAINE HYDROCHLORIDE 20 MG/ML
JELLY TOPICAL ONCE
Status: COMPLETED | OUTPATIENT
Start: 2020-01-13 | End: 2020-01-13

## 2020-01-13 RX ADMIN — LIDOCAINE HYDROCHLORIDE: 20 JELLY TOPICAL at 10:07

## 2020-01-13 ASSESSMENT — MIFFLIN-ST. JEOR: SCORE: 1505.93

## 2020-01-13 ASSESSMENT — PAIN SCALES - GENERAL: PAINLEVEL: NO PAIN (0)

## 2020-01-13 NOTE — PROGRESS NOTES
Received a refill request for crestor. Per Dr. Collins's note 11/2018, patient is no longer being seen here - deferred fax to PCP clinic for follow up.

## 2020-01-13 NOTE — LETTER
1/13/2020       RE: Cayden Dewitt  [address]  [facility] [address]     Dear Colleague,    Thank you for referring your patient, Cayden Dewitt, to the [facility] [place] at [facility]. Please see a copy of my visit note below.    CYSTOSCOPY AND URETERAL STENT REMOVAL PROCEDURE NOTE:    Cayden Dewitt is a 84 year old male  who presents with ureteral stent for cystoscopy and ureteral stent removal.    Pt ID verified with patient: Yes     Procedure verified with patient: Yes     Procedure confirmed with physician and support staff: Yes     Consent form confirmed with physician and support staff.    Sign In  History and Physical Exam reviewed .  Informed Consent Discussed: Yes   Sign in Communication: Yes   Time Out:  Team Confirms the Correct Patient, Correct Procedure; Yes , Correct Site and Site Marking, Correct Position (if applicable).    Affirmation of Time Out: Yes   Sign Out:  Sign Out Discussion: Yes     Cayden Dewitt is a 84 year old male with an indwelling ureteral stent in need of removal.    CYSTOSCOPY PROCEDURE:  After sterile preparation and draping of the patient,  a 17-Azerbaijani flexible cystoscope was introduced via the urethra.  It was passed without difficulty into the bladder.  The urethra was open without evidence of stricture.  The ureteral orifices were orthotopic.  The double J stent was seen coming out the right side.  It was grasped with an alligator forceps and extracted intact without difficulty.  The patient tolerated the procedure well.    Stone analysis:  Calculi composed primarily of   calcium oxalate monohydrate.     A/P Successful stent removal  Prophylactic antibiotic ordered  Stone prevention counseling provided today    STONE PREVENTION RECOMMENDATIONS:  - Drink enough water to make 2.5-3L of urine daily  - Add some squeezed lemon to the water to increase the citrate in your urine  - Consume a  normal amount of calcium   - Limit your oxalate consumption    [email or web address]     Watch for any new onset fevers, signs of UTI.  May expect some pain after removal.  If this is severe, or last many hours, you may need to return for replacement of stent.    Rene Sol MD   Urology  [facility]     Again, thank you for allowing me to participate in the care of your patient.      Sincerely,    Rene Sol MD

## 2020-01-13 NOTE — PATIENT INSTRUCTIONS
"STONE PREVENTION RECOMMENDATIONS:  - Drink enough water to make 2.5-3L of urine daily  - Add some squeezed lemon to the water to increase the citrate in your urine  - Consume a normal amount of calcium   - Limit your oxalate consumption    [email or web address]     AFTER YOUR CYSTOSCOPY  ?  ?  You have just completed a cystoscopy, or \"cysto\", which allowed your physician to learn more about your bladder (or to remove a stent placed after surgery). We suggest that you continue to avoid caffeine, fruit juice, and alcohol for the next 24 hours, however, you are encouraged to return to your normal activities.  ?  ?  A few things that are considered normal after your cystoscopy:  ?  * small amount of bleeding (or spotting) that clears within the next 24 hours  ?  * slight burning sensation with urination  ?  * sensation of needing to void (urinate) more frequently  ?  * the feeling of \"air\" in your urine  ?  * mild discomfort that is relieved with Tylenol    * bladder spasms  ?  ?  ?  Please contact our office promptly if you:  ?  * develop a fever above 101 degrees  ?  * are unable to urinate  ?  * develop bright red blood that does not stop  ?  * experience severe pain or swelling  ?  ?  ?  And of course, please contact our office with any concerns or questions [phone]  ?      AFTER YOUR CYSTOSCOPY        You have just completed a cystoscopy, or \"cysto\", which allowed your physician to learn more about your bladder (or to remove a stent placed after surgery). We suggest that you continue to avoid caffeine, fruit juice, and alcohol for the next 24 hours, however, you are encouraged to return to your normal activities.         A few things that are considered normal after your cystoscopy:     * Small amount of bleeding (or spotting) that clears within the next 24 hours     * Slight burning sensation with urination     * Sensation to of needing to " "avoid more frequently     * The feeling of \"air\" in your urine     * Mild discomfort that is relieved with Tylenol        Please contact our office promptly if you:     * Develop a fever above 101 degrees     * Are unable to urinate     * Develop bright red blood that does not stop     * Severe pain or swelling         Please contact our office with any concerns or questions @[facility].  "

## 2020-01-13 NOTE — NURSING NOTE
Chief Complaint   Patient presents with     Kidney Stone Related     Patient is here for cystoscopy and stent removal       Prior to the start of the procedure and with procedural staff participation, I verbally confirmed the patient s identity using two indicators, relevant allergies, that the procedure was appropriate and matched the consent or emergent situation, and that the correct equipment/implants were available. Immediately prior to starting the procedure I conducted the Time Out with the procedural staff and re-confirmed the patient s name, procedure, and site/side. I have wiped the patient off with the povidone-Iodine solution, draped them, and used Lidocaine hydrochloride jelly. (The Joint Commission universal protocol was followed.)  Yes    Sedation (Moderate or Deep): None    5mL 2% lidocaine hydrochloride Urojet instilled into urethra.    NDC# [address]-21  Lot #: [id number]  Expiration Date:  08/21      Caroline Purcell EMT

## 2020-06-06 ENCOUNTER — HOSPITAL ENCOUNTER (OUTPATIENT)
Dept: LAB | Facility: CLINIC | Age: 85
Discharge: HOME | End: 2020-06-06
Attending: UROLOGY | Admitting: UROLOGY
Payer: MEDICARE

## 2020-06-06 DIAGNOSIS — R39.89 BLADDER PAIN: ICD-10-CM

## 2020-06-06 DIAGNOSIS — R30.0 BURNING WITH URINATION: ICD-10-CM

## 2020-06-06 DIAGNOSIS — N39.0 URINARY TRACT INFECTION: ICD-10-CM

## 2020-06-06 LAB
ALBUMIN UR-MCNC: NEGATIVE MG/DL
APPEARANCE UR: CLEAR
BILIRUB UR QL STRIP: NEGATIVE
COLOR UR AUTO: YELLOW
GLUCOSE UR STRIP-MCNC: NEGATIVE MG/DL
HGB UR QL STRIP: NEGATIVE
KETONES UR STRIP-MCNC: NEGATIVE MG/DL
LEUKOCYTE ESTERASE UR QL STRIP: NEGATIVE
NITRATE UR QL: NEGATIVE
PH UR STRIP: 6 PH (ref 5–7)
SOURCE: NORMAL
SP GR UR STRIP: 1.02 (ref 1–1.03)
UROBILINOGEN UR STRIP-MCNC: NORMAL MG/DL (ref 0–2)

## 2020-06-06 PROCEDURE — 87086 URINE CULTURE/COLONY COUNT: CPT | Performed by: UROLOGY

## 2020-06-06 PROCEDURE — 81003 URINALYSIS AUTO W/O SCOPE: CPT | Performed by: UROLOGY

## 2020-06-07 LAB
BACTERIA SPEC CULT: NORMAL
Lab: NORMAL
SPECIMEN SOURCE: NORMAL

## 2020-08-26 DIAGNOSIS — R39.89 BLADDER PAIN: Primary | ICD-10-CM

## 2020-08-28 ENCOUNTER — VIRTUAL VISIT (OUTPATIENT)
Dept: UROLOGY | Facility: CLINIC | Age: 85
End: 2020-08-28
Payer: MEDICARE

## 2020-08-28 DIAGNOSIS — R10.2 PELVIC PAIN IN MALE: ICD-10-CM

## 2020-08-28 DIAGNOSIS — R14.0 ABDOMINAL BLOATING: Primary | ICD-10-CM

## 2020-08-28 PROCEDURE — 99443 ZZC PHYSICIAN TELEPHONE EVALUATION 21-30 MIN: CPT | Performed by: UROLOGY

## 2020-08-28 NOTE — PROGRESS NOTES
"[facility]  CHIEF COMPLAINT   It was my pleasure to see Cayden Dewitt who is a 84 year old male for follow-up of history of nephrolithiasis and prior TURP.      HPI   Cayden Dewitt is a very pleasant 84 year old male     Initially seen 12/23/19:  \"Cayden Dewitt is a 84 year old male who is being seen for evaluation of RIGHT ureteral stone     Had a recent ear infection.     Has been doing well with no real issues during the day  Has a low back ache at night. This is mainly midline, slightly to the right.   He presented to the ER on 12/14 for this back pain.   CT with 9.2mm RIGHT UPJ stone     Had prior history of kidney stones on the LEFT side.      Had a prostate surgery about 10 years ago - TURP.\"    1/3/20:  Ureteroscopy   \"Cystoscopy with evidence of prior TURP with nodular regrowth of bilateral lobes and a posterior prostatic urethra tissue bridge. Bladder with severe trabeculations. RIGHT Ureteroscopy with evidence of the large stone in the mid/distal ureter. Stone laser fragmented and all fragments removed. Retrograde Pyelogram with no further evidence of stone. RIGHT ureteral stent placed.\"    TODAY:  4 months after Ureteroscopy he noted some ache in his \"bladder\". This then subsided. 3-4 days later he noted an ache in his \"bladder\"  This is a slight ache which radiates down to perineum and testicles at times  Not painful  He notes that he has been having lot of GI upset, gas pain, and belching. At times, this gas pain seems to then cause his \"bladder pain\"  This then resolves   Noted some dysuria and U/A UCx on 6/6 was normal    PCP is Dr. Guillory    PHYSICAL EXAM  Patient is a 84 year old  male   Vitals: There were no vitals taken for this visit.  There is no height or weight on file to calculate BMI.    General: No evidence of distress   Lungs: normal respiratory effort  Neuro: Alert, oriented, speech and mentation normal  Psych: affect and mood normal    UA RESULTS:  Recent Labs   Lab Test " 06/06/20  1235 12/23/19  1056 12/14/19  1518   COLOR Yellow Yellow Yellow   APPEARANCE Clear Clear Clear   URINEGLC Negative Negative Negative   URINEBILI Negative Negative Negative   URINEKETONE Negative Negative Negative   SG 1.016 1.015 1.008   UBLD Negative Trace* Negative   URINEPH 6.0 7.0 5.5   PROTEIN Negative Negative Negative   UROBILINOGEN  --  0.2  --    NITRITE Negative Negative Negative   LEUKEST Negative Negative Negative   RBCU  --   --  1   WBCU  --   --  3     IMAGING:  All pertinent imaging reviewed:    All imaging studies reviewed by me.  I personally reviewed these imaging films.  A formal report from radiology will follow.    FINDINGS: 9 mm stone in the proximal right ureter essentially at the  ureteropelvic junction with mild-moderate proximal hydronephrosis on  the right. Minimal right perinephric stranding. No other renal,  ureteral, or bladder stones There are no dilated loops of small  intestine or large bowel to suggest ileus or obstruction. No free  fluid. No free air. There are no lymph nodes that are abnormal by size  criteria.  There is mild diverticulosis without evidence for  diverticulitis. There are moderate atherosclerotic changes of the  visualized aorta and its branches. There is no evidence of aortic  dissection or aneurysm. The liver, spleen, adrenal glands, kidneys,  and pancreas demonstrate no worrisome focal lesion. Unremarkable  gallbladder. Survey of the visualized bony structures demonstrates no  destructive bony lesions. The visualized lung bases are unremarkable.                                                                      IMPRESSION: 9 mm stone at the right ureteropelvic junction with mild  proximal hydronephrosis and stranding.     ASSESSMENT and PLAN  84 year old man with history of TURP and nephrolithiasis with some abdominal pain.    Abdominal/pelvic pain  -I reviewed his recent labs and his prior imaging  -There is no evidence of any other stones on his  prior CT scan and he is not having any lateral flank pain  -He is voiding very well with a good stream and so I am not concerned about urinary retention.  His recent urinalysis and urine culture were normal and so I am not worried about a urine infection.  -Given that his main concern at issue is abdominal bloating and increased gas pressure, we discussed the relationship between the intestines and the urinary tract and that increased gas pain and pressure could cause some lower pelvic pain as well.  -I recommend that he follow-up with his primary care physician for further evaluation and consideration of a GI referral  -He may follow-up with me on a as needed basis    Chart documentation with Dragon Voice recognition Software. Although reviewed after completion, some words and grammatical errors may remain.     Telephone time: 21 minutes    Rene Sol MD   Urology  [facility]  [facility] Phone: [phone]  [facility] Phone: [phone]

## 2020-08-28 NOTE — LETTER
"8/28/2020       RE: Cayden Dewitt  [address]  [facility] [address]     Dear Colleague,    Thank you for referring your patient, Cayden Dewitt, to the [facility] at [facility]. Please see a copy of my visit note below.    Cayden Dewitt is a 84 year old male who is being evaluated via a billable telephone visit.      The patient has been notified of following:     \"This telephone visit will be conducted via a call between you and your physician/provider. We have found that certain health care needs can be provided without the need for a physical exam.  This service lets us provide the care you need with a short phone conversation.  If a prescription is necessary we can send it directly to your pharmacy.  If lab work is needed we can place an order for that and you can then stop by our lab to have the test done at a later time.    Telephone visits are billed at different rates depending on your insurance coverage. During this emergency period, for some insurers they may be billed the same as an in-person visit.  Please reach out to your insurance provider with any questions.    If during the course of the call the physician/provider feels a telephone visit is not appropriate, you will not be charged for this service.\"    Patient has given verbal consent for Telephone visit?  Yes    What phone number would you like to be contacted at? [phone]    How would you like to obtain your AVS? Mail a copy        LEDA  CHIEF COMPLAINT   It was my pleasure to see Cayden Dewitt who is a 84 year old male for follow-up of history of nephrolithiasis and prior TURP.      HPI   Cayden Dewitt is a very pleasant 84 year old male     Initially seen 12/23/19:  \"Cayden Dewitt is a 84 year old male who is being seen for evaluation of RIGHT ureteral stone     Had a recent ear infection.     Has been doing well with no real " "issues during the day  Has a low back ache at night. This is mainly midline, slightly to the right.   He presented to the ER on 12/14 for this back pain.   CT with 9.2mm RIGHT UPJ stone     Had prior history of kidney stones on the LEFT side.      Had a prostate surgery about 10 years ago - TURP.\"    1/3/20:  Ureteroscopy   \"Cystoscopy with evidence of prior TURP with nodular regrowth of bilateral lobes and a posterior prostatic urethra tissue bridge. Bladder with severe trabeculations. RIGHT Ureteroscopy with evidence of the large stone in the mid/distal ureter. Stone laser fragmented and all fragments removed. Retrograde Pyelogram with no further evidence of stone. RIGHT ureteral stent placed.\"    TODAY:  4 months after Ureteroscopy he noted some ache in his \"bladder\". This then subsided. 3-4 days later he noted an ache in his \"bladder\"  This is a slight ache which radiates down to perineum and testicles at times  Not painful  He notes that he has been having lot of GI upset, gas pain, and belching. At times, this gas pain seems to then cause his \"bladder pain\"  This then resolves   Noted some dysuria and U/A UCx on 6/6 was normal    PCP is Dr. Guillory    PHYSICAL EXAM  Patient is a 84 year old  male   Vitals: There were no vitals taken for this visit.  There is no height or weight on file to calculate BMI.    General: No evidence of distress   Lungs: normal respiratory effort  Neuro: Alert, oriented, speech and mentation normal  Psych: affect and mood normal    UA RESULTS:  Recent Labs   Lab Test 06/06/20  1235 12/23/19  1056 12/14/19  1518   COLOR Yellow Yellow Yellow   APPEARANCE Clear Clear Clear   URINEGLC Negative Negative Negative   URINEBILI Negative Negative Negative   URINEKETONE Negative Negative Negative   SG 1.016 1.015 1.008   UBLD Negative Trace* Negative   URINEPH 6.0 7.0 5.5   PROTEIN Negative Negative Negative   UROBILINOGEN  --  0.2  --    NITRITE Negative Negative Negative   LEUKEST Negative " Negative Negative   RBCU  --   --  1   WBCU  --   --  3     IMAGING:  All pertinent imaging reviewed:    All imaging studies reviewed by me.  I personally reviewed these imaging films.  A formal report from radiology will follow.    FINDINGS: 9 mm stone in the proximal right ureter essentially at the  ureteropelvic junction with mild-moderate proximal hydronephrosis on  the right. Minimal right perinephric stranding. No other renal,  ureteral, or bladder stones There are no dilated loops of small  intestine or large bowel to suggest ileus or obstruction. No free  fluid. No free air. There are no lymph nodes that are abnormal by size  criteria.  There is mild diverticulosis without evidence for  diverticulitis. There are moderate atherosclerotic changes of the  visualized aorta and its branches. There is no evidence of aortic  dissection or aneurysm. The liver, spleen, adrenal glands, kidneys,  and pancreas demonstrate no worrisome focal lesion. Unremarkable  gallbladder. Survey of the visualized bony structures demonstrates no  destructive bony lesions. The visualized lung bases are unremarkable.                                                                      IMPRESSION: 9 mm stone at the right ureteropelvic junction with mild  proximal hydronephrosis and stranding.     ASSESSMENT and PLAN  84 year old man with history of TURP and nephrolithiasis with some abdominal pain.    Abdominal/pelvic pain  -I reviewed his recent labs and his prior imaging  -There is no evidence of any other stones on his prior CT scan and he is not having any lateral flank pain  -He is voiding very well with a good stream and so I am not concerned about urinary retention.  His recent urinalysis and urine culture were normal and so I am not worried about a urine infection.  -Given that his main concern at issue is abdominal bloating and increased gas pressure, we discussed the relationship between the intestines and the urinary tract and  that increased gas pain and pressure could cause some lower pelvic pain as well.  -I recommend that he follow-up with his primary care physician for further evaluation and consideration of a GI referral  -He may follow-up with me on a as needed basis    Chart documentation with Dragon Voice recognition Software. Although reviewed after completion, some words and grammatical errors may remain.     Telephone time: 21 minutes    Rene Sol MD   Urology  [facility]  [employer] Phone: [phone]  [facility] Phone: [phone]

## 2020-08-28 NOTE — PROGRESS NOTES
"Cayden Dewitt is a 84 year old male who is being evaluated via a billable telephone visit.      The patient has been notified of following:     \"This telephone visit will be conducted via a call between you and your physician/provider. We have found that certain health care needs can be provided without the need for a physical exam.  This service lets us provide the care you need with a short phone conversation.  If a prescription is necessary we can send it directly to your pharmacy.  If lab work is needed we can place an order for that and you can then stop by our lab to have the test done at a later time.    Telephone visits are billed at different rates depending on your insurance coverage. During this emergency period, for some insurers they may be billed the same as an in-person visit.  Please reach out to your insurance provider with any questions.    If during the course of the call the physician/provider feels a telephone visit is not appropriate, you will not be charged for this service.\"    Patient has given verbal consent for Telephone visit?  Yes    What phone number would you like to be contacted at? [phone]    How would you like to obtain your AVS? Mail a copy      "

## 2023-01-12 NOTE — PROGRESS NOTES
CYSTOSCOPY AND URETERAL STENT REMOVAL PROCEDURE NOTE:    Cayden Dewitt is a 84 year old male  who presents with ureteral stent for cystoscopy and ureteral stent removal.    Pt ID verified with patient: Yes     Procedure verified with patient: Yes     Procedure confirmed with physician and support staff: Yes     Consent form confirmed with physician and support staff.    Sign In  History and Physical Exam reviewed .  Informed Consent Discussed: Yes   Sign in Communication: Yes   Time Out:  Team Confirms the Correct Patient, Correct Procedure; Yes , Correct Site and Site Marking, Correct Position (if applicable).    Affirmation of Time Out: Yes   Sign Out:  Sign Out Discussion: Yes     Cayden Dewitt is a 84 year old male with an indwelling ureteral stent in need of removal.    CYSTOSCOPY PROCEDURE:  After sterile preparation and draping of the patient,  a 17-Peruvian flexible cystoscope was introduced via the urethra.  It was passed without difficulty into the bladder.  The urethra was open without evidence of stricture.  The ureteral orifices were orthotopic.  The double J stent was seen coming out the right side.  It was grasped with an alligator forceps and extracted intact without difficulty.  The patient tolerated the procedure well.    Stone analysis:  Calculi composed primarily of   calcium oxalate monohydrate.     A/P Successful stent removal  Prophylactic antibiotic ordered  Stone prevention counseling provided today    STONE PREVENTION RECOMMENDATIONS:  - Drink enough water to make 2.5-3L of urine daily  - Add some squeezed lemon to the water to increase the citrate in your urine  - Consume a normal amount of calcium   - Limit your oxalate consumption    [email or web address]     Watch for any new onset fevers, signs of UTI.  May expect some pain after removal.  If this is severe, or last many hours, you may need to return for  replacement of stent.    Rene Sol MD   Urology  [facility]    Siliq Counseling:  I discussed with the patient the risks of Siliq including but not limited to new or worsening depression, suicidal thoughts and behavior, immunosuppression, malignancy, posterior leukoencephalopathy syndrome, and serious infections.  The patient understands that monitoring is required including a PPD at baseline and must alert us or the primary physician if symptoms of infection or other concerning signs are noted. There is also a special program designed to monitor depression which is required with Siliq.

## 2024-01-10 ENCOUNTER — NURSE TRIAGE (OUTPATIENT)
Dept: NURSING | Facility: CLINIC | Age: 89
End: 2024-01-10
Payer: MEDICARE

## 2024-01-10 NOTE — TELEPHONE ENCOUNTER
Cayden was advised by his [facility] clinic to be seen today due to a sudden onset of Cramping in his Hands that started ~3:45 pm    He is asking about being seen at the [facility].  At this time he states that his symptoms are almost completely resolved and that his hands are feeling almost back to normal.    Care Everywhere utilized to see [facility] encounter & MD recommendations:    Ani Vasquez MD - 01/10/2024 4:08 PM CST  Formatting of this note might be different from the original.  Recommend to be seen in the urgent care today or emergency room  Electronically signed by Ani Vasquez MD at 01/10/2024 4:09 PM CST    Notified pt of MD advice, noted above.  Pt states that he will contact his daughter to have her bring him to [facility] ER.    Madelin King, JEANNIE  [facility]      Reason for Disposition   General information question, no triage required and triager able to answer question   [1] Follow-up call to recent contact AND [2] information only call, no triage required    Protocols used: Information Only Call - No Triage-A-

## 2024-01-11 ENCOUNTER — HOSPITAL ENCOUNTER (EMERGENCY)
Facility: CLINIC | Age: 89
Discharge: HOME | End: 2024-01-11
Attending: EMERGENCY MEDICINE | Admitting: EMERGENCY MEDICINE
Payer: MEDICARE

## 2024-01-11 VITALS
HEART RATE: 54 BPM | DIASTOLIC BLOOD PRESSURE: 86 MMHG | RESPIRATION RATE: 20 BRPM | TEMPERATURE: 98 F | OXYGEN SATURATION: 96 % | SYSTOLIC BLOOD PRESSURE: 142 MMHG

## 2024-01-11 DIAGNOSIS — R25.2 HAND CRAMPS: ICD-10-CM

## 2024-01-11 LAB
ANION GAP SERPL CALCULATED.3IONS-SCNC: 8 MMOL/L (ref 7–15)
BASOPHILS # BLD AUTO: 0 10E3/UL (ref 0–0.2)
BASOPHILS NFR BLD AUTO: 1 %
BUN SERPL-MCNC: 15.7 MG/DL (ref 8–23)
CALCIUM SERPL-MCNC: 9.8 MG/DL (ref 8.8–10.2)
CHLORIDE SERPL-SCNC: 100 MMOL/L (ref 98–107)
CREAT SERPL-MCNC: 1.03 MG/DL (ref 0.67–1.17)
DEPRECATED HCO3 PLAS-SCNC: 28 MMOL/L (ref 22–29)
EGFRCR SERPLBLD CKD-EPI 2021: 70 ML/MIN/1.73M2
EOSINOPHIL # BLD AUTO: 0.2 10E3/UL (ref 0–0.7)
EOSINOPHIL NFR BLD AUTO: 3 %
ERYTHROCYTE [DISTWIDTH] IN BLOOD BY AUTOMATED COUNT: 13.2 % (ref 10–15)
GLUCOSE SERPL-MCNC: 128 MG/DL (ref 70–99)
HCT VFR BLD AUTO: 42.3 % (ref 40–53)
HGB BLD-MCNC: 14 G/DL (ref 13.3–17.7)
IMM GRANULOCYTES # BLD: 0 10E3/UL
IMM GRANULOCYTES NFR BLD: 0 %
LYMPHOCYTES # BLD AUTO: 1.6 10E3/UL (ref 0.8–5.3)
LYMPHOCYTES NFR BLD AUTO: 20 %
MCH RBC QN AUTO: 30.2 PG (ref 26.5–33)
MCHC RBC AUTO-ENTMCNC: 33.1 G/DL (ref 31.5–36.5)
MCV RBC AUTO: 91 FL (ref 78–100)
MONOCYTES # BLD AUTO: 0.8 10E3/UL (ref 0–1.3)
MONOCYTES NFR BLD AUTO: 10 %
NEUTROPHILS # BLD AUTO: 5.1 10E3/UL (ref 1.6–8.3)
NEUTROPHILS NFR BLD AUTO: 66 %
NRBC # BLD AUTO: 0 10E3/UL
NRBC BLD AUTO-RTO: 0 /100
PLATELET # BLD AUTO: 194 10E3/UL (ref 150–450)
POTASSIUM SERPL-SCNC: 3.8 MMOL/L (ref 3.4–5.3)
RBC # BLD AUTO: 4.64 10E6/UL (ref 4.4–5.9)
SODIUM SERPL-SCNC: 136 MMOL/L (ref 135–145)
WBC # BLD AUTO: 7.7 10E3/UL (ref 4–11)

## 2024-01-11 PROCEDURE — 99283 EMERGENCY DEPT VISIT LOW MDM: CPT

## 2024-01-11 PROCEDURE — 85025 COMPLETE CBC W/AUTO DIFF WBC: CPT | Performed by: EMERGENCY MEDICINE

## 2024-01-11 PROCEDURE — 80048 BASIC METABOLIC PNL TOTAL CA: CPT | Performed by: EMERGENCY MEDICINE

## 2024-01-11 PROCEDURE — 36415 COLL VENOUS BLD VENIPUNCTURE: CPT | Performed by: EMERGENCY MEDICINE

## 2024-01-11 ASSESSMENT — ACTIVITIES OF DAILY LIVING (ADL)
ADLS_ACUITY_SCORE: 35
ADLS_ACUITY_SCORE: 35

## 2024-01-11 NOTE — ED PROVIDER NOTES
"  History     Chief Complaint:  Hand cramping       The history is provided by the patient.      Cayden Dewitt is a 88 year old male with a history of NSTEMI, type 2 diabetes mellitus, hypertension, and asthma who presents with hand cramping. Yesterday, he was writing some bills when he suddenly had an episode of bilateral hand cramping that lasted several minutes. He spoke with someone who said he might be dehydrated, so he started drinking a lot of water that night. He has not had more cramping today, but eventually spoke with primary care clinic and was advised to go to the ED to have his electrolytes checked. At bedside, he mentions that he has had congestion and \"a head cold\" recently. Also, he notes that he has yet to take his blood pressure medications this morning.     Independent Historian:   None - Patient Only    Review of External Notes:   None      Medications:    Amlodipine    Aspirin   Carvedilol   Hydralazine    Losartan   Metformin    Nitroglycerin    Ondansetron   Oxycodone    Rosuvastatin   Tamsulosin     Past Medical History:    Ascending aorta dilatation   HTN   CAD   HLD   NSTEMI   Type 2 DM w/o complications   Asthma   NSTEMI   ACS   BPH   B/L cataract     Past Surgical History:    Lithotripsy w/ R ureteral stent placement   Prostatectomy   T & A   TURP     Physical Exam     Patient Vitals for the past 24 hrs:   BP Temp Temp src Pulse Resp SpO2   01/11/24 0730 (!) 142/86 -- -- 54 -- 96 %   01/11/24 0638 (!) 151/98 98  F (36.7  C) Oral 79 20 96 %        Physical Exam  Physical Exam   Nursing note and vitals reviewed.  General: Oriented to person, place, and time. Appears well-developed and well-nourished.   Head: No signs of trauma.   Mouth/Throat: Oropharynx is clear and moist.   Eyes: Conjunctivae are normal. Pupils are equal, round, and reactive to light.   Neck: Normal range of motion. No nuchal rigidity.   Cardiovascular: Normal rate and regular rhythm.    Respiratory: Effort normal " and breath sounds normal. No respiratory distress.   Abdominal: Soft. There is no tenderness. There is no guarding.   Musculoskeletal: Normal range of motion. no edema.   Neurological: The patient is alert and oriented to person, place, and time.  PERRLA, EOMI, visual fields intact, strength in upper/lower extremities normal and symmetrical.   Sensation normal. Speech normal  GCS eye subscore is 4. GCS verbal subscore is 5. GCS motor subscore is 6.   Skin: Skin is warm and dry. No rash noted.   Psychiatric: normal mood and affect. behavior is normal.     Emergency Department Course   Laboratory:  Labs Ordered and Resulted from Time of ED Arrival to Time of ED Departure   BASIC METABOLIC PANEL - Abnormal       Result Value    Sodium 136      Potassium 3.8      Chloride 100      Carbon Dioxide (CO2) 28      Anion Gap 8      Urea Nitrogen 15.7      Creatinine 1.03      GFR Estimate 70      Calcium 9.8      Glucose 128 (*)    CBC WITH PLATELETS AND DIFFERENTIAL    WBC Count 7.7      RBC Count 4.64      Hemoglobin 14.0      Hematocrit 42.3      MCV 91      MCH 30.2      MCHC 33.1      RDW 13.2      Platelet Count 194      % Neutrophils 66      % Lymphocytes 20      % Monocytes 10      % Eosinophils 3      % Basophils 1      % Immature Granulocytes 0      NRBCs per 100 WBC 0      Absolute Neutrophils 5.1      Absolute Lymphocytes 1.6      Absolute Monocytes 0.8      Absolute Eosinophils 0.2      Absolute Basophils 0.0      Absolute Immature Granulocytes 0.0      Absolute NRBCs 0.0        Emergency Department Course & Assessments:    Interventions:  None     Assessments:  0714 I obtained history and examined the patient as noted above.  0913 I rechecked the patient and explained findings. I discussed plan for discharge home.    Independent Interpretation (X-rays, CTs, rhythm strip):  None     Consultations/Discussion of Management or Tests:  None      Social Determinants of Health affecting care:   Stress/Adjustment  Disorders    Disposition:  The patient was discharged to home.     Impression & Plan    Medical Decision Making:  This patient is presenting to the ER complaining of hand cramps that occurred yesterday.  He is currently asymptomatic.  Laboratory studies reveal no evidence of electrolyte disturbance that could explain his symptoms.  He is neurologically intact.  He denies chest pain.  His pulses are equal bilaterally in the upper extremities.  Patient will follow-up with his primary care doctor for further evaluation or return to the ER with recurrence of his symptoms.    Diagnosis:    ICD-10-CM    1. Hand cramps  R25.2          Scribe Disclosure:  IHerber, am serving as a scribe at 7:12 AM on 1/11/2024 to document services personally performed by Michael Ingram MD based on my observations and the provider's statements to me.   1/11/2024   Michael Ingram MD Joing, Todd Roger, MD  01/11/24 1527

## 2024-01-11 NOTE — ED TRIAGE NOTES
Patient was told to come in yesterday to be checked for dehydration because he had some cramping in his hands yesterday. He does not have these symptoms today but was not able to come in last night.  He was told he may be dehydrated and needs his electrolytes to be checked.      Triage Assessment (Adult)       Row Name 01/11/24 0629          Triage Assessment    Airway WDL WDL        Respiratory WDL    Respiratory WDL WDL        Skin Circulation/Temperature WDL    Skin Circulation/Temperature WDL WDL        Cardiac WDL    Cardiac WDL WDL

## 2024-01-26 ENCOUNTER — OFFICE VISIT (OUTPATIENT)
Dept: URGENT CARE | Facility: URGENT CARE | Age: 89
End: 2024-01-26
Payer: MEDICARE

## 2024-01-26 VITALS
SYSTOLIC BLOOD PRESSURE: 140 MMHG | OXYGEN SATURATION: 98 % | HEART RATE: 74 BPM | RESPIRATION RATE: 12 BRPM | DIASTOLIC BLOOD PRESSURE: 68 MMHG | TEMPERATURE: 97.3 F

## 2024-01-26 DIAGNOSIS — R09.A2 GLOBUS SENSATION: ICD-10-CM

## 2024-01-26 DIAGNOSIS — R07.0 THROAT PAIN: Primary | ICD-10-CM

## 2024-01-26 PROCEDURE — 99203 OFFICE O/P NEW LOW 30 MIN: CPT | Performed by: NURSE PRACTITIONER

## 2024-01-26 NOTE — PROGRESS NOTES
Assessment & Plan     Throat pain  - lidocaine (viscous) (XYLOCAINE) 2 % 15 mL, alum & mag hydroxide-simethicone (MAALOX) 15 mL GI Cocktail    Globus sensation  - lidocaine (viscous) (XYLOCAINE) 2 % 15 mL, alum & mag hydroxide-simethicone (MAALOX) 15 mL GI Cocktail     Patient Instructions   GI cocktail given with good relief after 20 minutes.      Recommend maalox  [place] soft diet for the next few days.    Follow up with PCP if this persists or worsens.        Return in about 1 week (around 2/2/2024) for with regular provider if symptoms persist.    THOMAS Vu [facility] JEYSON Rodatre is a 88 year old male who presents to clinic today for the following health issues:  Chief Complaint   Patient presents with    Throat Problem     1 week, difficulty swallowing or eating, burping, sinus congestion     HPI    Gastro    Onset of symptoms was 6 day(s) ago.  Course of illness is same.    Severity moderate  Current and Associated symptoms:  difficulty swallowing, feeling full, sensation of something in his throat.  Burping a lot  Aggravating factors: eating.    Alleviating factors:nothing  Diarrhea: No  Stools: formed  Vomitting: No  Appetite: decreased  Risk factors: none      Review of Systems  Constitutional, HEENT, cardiovascular, pulmonary, GI, , musculoskeletal, neuro, skin, endocrine and psych systems are negative, except as otherwise noted.      Objective    BP (!) 140/68   Pulse 74   Temp 97.3  F (36.3  C)   Resp 12   SpO2 98%   Physical Exam   GENERAL: alert and no distress  EYES: Eyes grossly normal to inspection, PERRL and conjunctivae and sclerae normal  HENT: ear canals and TM's normal, nose and mouth without ulcers or lesions  NECK: no adenopathy, no asymmetry, masses, or scars  RESP: lungs clear to auscultation - no rales, rhonchi or wheezes  CV: regular rate and rhythm, normal S1 S2, no S3 or S4, no murmur, click or rub, no peripheral  edema  ABDOMEN: soft, nontender, no hepatosplenomegaly, no masses and bowel sounds normal  MS: no gross musculoskeletal defects noted, no edema

## 2024-01-30 ENCOUNTER — ANCILLARY PROCEDURE (OUTPATIENT)
Dept: GENERAL RADIOLOGY | Facility: CLINIC | Age: 89
End: 2024-01-30
Attending: FAMILY MEDICINE
Payer: MEDICARE

## 2024-01-30 ENCOUNTER — OFFICE VISIT (OUTPATIENT)
Dept: URGENT CARE | Facility: URGENT CARE | Age: 89
End: 2024-01-30
Payer: MEDICARE

## 2024-01-30 VITALS
SYSTOLIC BLOOD PRESSURE: 123 MMHG | DIASTOLIC BLOOD PRESSURE: 74 MMHG | HEART RATE: 64 BPM | TEMPERATURE: 98 F | OXYGEN SATURATION: 97 % | RESPIRATION RATE: 16 BRPM

## 2024-01-30 DIAGNOSIS — J01.90 ACUTE SINUSITIS WITH COEXISTING CONDITION REQUIRING PROPHYLACTIC TREATMENT: ICD-10-CM

## 2024-01-30 DIAGNOSIS — R09.81 NASAL CONGESTION: ICD-10-CM

## 2024-01-30 DIAGNOSIS — R13.10 DYSPHAGIA, UNSPECIFIED TYPE: Primary | ICD-10-CM

## 2024-01-30 PROCEDURE — 99213 OFFICE O/P EST LOW 20 MIN: CPT | Performed by: FAMILY MEDICINE

## 2024-01-30 PROCEDURE — 70360 X-RAY EXAM OF NECK: CPT | Mod: TC | Performed by: RADIOLOGY

## 2024-01-30 RX ORDER — AMOXICILLIN 500 MG/1
500 CAPSULE ORAL 2 TIMES DAILY
Qty: 14 CAPSULE | Refills: 0 | Status: SHIPPED | OUTPATIENT
Start: 2024-01-30 | End: 2024-02-06

## 2024-01-30 NOTE — PROGRESS NOTES
SUBJECTIVE: Cayden Dewitt is a 88 year old male presenting with a chief complaint of nasal congestion and ST with difficulty swollowing with a full gulp of water.  Onset of symptoms was day(s) ago.  No ha    Past Medical History:   Diagnosis Date    Ascending aorta dilatation (H24)     Benign essential HTN     CAD (coronary artery disease)     11/29/17- ASA 3.5x16mm Promus to Ramux and ASA [id number] Promus to prox PDA    Hyperlipidemia     NSTEMI (non-ST elevated myocardial infarction) (H) 11/2017     90% lesion in the ramus stented with a 3.5 x 16 mm Promus Premier drug-eluting stent as well as a 90% PDA lesion stented with a 2.25 x 28 mm Promus Premier drug-eluting stent.LMA30%, 70% ostial Dl small vessel  treat medically. D4dbzga 100% The LAD was 30% diseased.  LV and diastolic pressure was 20    Type 2 diabetes mellitus without complications (H)     Uncomplicated asthma      Allergies   Allergen Reactions    Ciprofloxacin     Contrast Dye Unknown     Facial and lip swelling    Iodine     Morphine Unknown     sensitive     Social History     Tobacco Use    Smoking status: Never    Smokeless tobacco: Never   Substance Use Topics    Alcohol use: No       ROS:  SKIN: no rash  GI: no vomiting    OBJECTIVE:  /74   Pulse 64   Temp 98  F (36.7  C) (Tympanic)   Resp 16   SpO2 97% GENERAL APPEARANCE: healthy, alert and no distress  EYES: EOMI,  PERRL, conjunctiva clear  HENT: ear canals and TM's normal.  Nose and mouth without ulcers, erythema or lesions  NECK: supple, nontender, no lymphadenopathy  NEURO: Normal strength and tone, sensory exam grossly normal,  normal speech and mentation  SKIN: no suspicious lesions or rashes    XR   IMPRESSION: The epiglottis is unremarkable. No radiodense foreign body  in the airway visualized. Vertebral body heights are maintained. Mild  multilevel loss of disc height. No prevertebral edema. The  craniocervical junction is without acute abnormality. Only lateral  view  obtained.      TONY SALAZAR MD       ICD-10-CM    1. Dysphagia, unspecified type  R13.10 XR Neck Soft Tissue     Adult ENT  Referral      2. Nasal congestion  R09.81 amoxicillin (AMOXIL) 500 MG capsule     Adult ENT  Referral      3. Acute sinusitis with coexisting condition requiring prophylactic treatment  J01.90 amoxicillin (AMOXIL) 500 MG capsule     Adult ENT  Referral          Fluids/Rest, f/u if worse/not any better

## 2025-02-12 ENCOUNTER — TRANSCRIBE ORDERS (OUTPATIENT)
Dept: OTHER | Age: OVER 89
End: 2025-02-12

## 2025-02-12 DIAGNOSIS — Z95.5 STENTED CORONARY ARTERY: Primary | ICD-10-CM

## (undated) DEVICE — BASKET NITINOL TIPLESS HALO  1.5FRX120CM 554120

## (undated) DEVICE — GLOVE PROTEXIS MICRO 7.5  2D73PM75

## (undated) DEVICE — GUIDEWIRE SENSOR DUAL FLEX ANG 0.035"X150CM M0066703010

## (undated) DEVICE — PAD CHUX UNDERPAD 23X24" 7136

## (undated) DEVICE — CATH URETERAL OPEN END 6FR AXXCESS

## (undated) DEVICE — PACK CYSTOSCOPY SBA15CYFSI

## (undated) DEVICE — SOL WATER IRRIG 1000ML BOTTLE 2F7114

## (undated) DEVICE — GUIDEWIRE SENSOR DUAL FLEX STR 0.035"X150CM M0066703080

## (undated) DEVICE — SOL NACL 0.9% IRRIG 3000ML BAG 2B7477

## (undated) DEVICE — LASER FIBER HOLMIUM FLEXIVA 200UM M0068403910 840-391

## (undated) DEVICE — RAD RX ISOVUE 300 (50ML) 61% IOPAMIDOL CHARGE PER ML

## (undated) RX ORDER — LIDOCAINE HYDROCHLORIDE 10 MG/ML
INJECTION, SOLUTION EPIDURAL; INFILTRATION; INTRACAUDAL; PERINEURAL
Status: DISPENSED
Start: 2017-11-29

## (undated) RX ORDER — FENTANYL CITRATE 0.05 MG/ML
INJECTION, SOLUTION INTRAMUSCULAR; INTRAVENOUS
Status: DISPENSED
Start: 2020-01-03

## (undated) RX ORDER — FENTANYL CITRATE 50 UG/ML
INJECTION, SOLUTION INTRAMUSCULAR; INTRAVENOUS
Status: DISPENSED
Start: 2017-11-29

## (undated) RX ORDER — HEPARIN SODIUM 1000 [USP'U]/ML
INJECTION, SOLUTION INTRAVENOUS; SUBCUTANEOUS
Status: DISPENSED
Start: 2017-11-29

## (undated) RX ORDER — FENTANYL CITRATE 50 UG/ML
INJECTION, SOLUTION INTRAMUSCULAR; INTRAVENOUS
Status: DISPENSED
Start: 2020-01-03

## (undated) RX ORDER — NITROGLYCERIN 5 MG/ML
VIAL (ML) INTRAVENOUS
Status: DISPENSED
Start: 2017-11-29

## (undated) RX ORDER — LIDOCAINE HYDROCHLORIDE 20 MG/ML
INJECTION, SOLUTION EPIDURAL; INFILTRATION; INTRACAUDAL; PERINEURAL
Status: DISPENSED
Start: 2017-11-29

## (undated) RX ORDER — CLOPIDOGREL 300 MG/1
TABLET, FILM COATED ORAL
Status: DISPENSED
Start: 2017-11-29

## (undated) RX ORDER — VERAPAMIL HYDROCHLORIDE 2.5 MG/ML
INJECTION, SOLUTION INTRAVENOUS
Status: DISPENSED
Start: 2017-11-29

## (undated) RX ORDER — CEFAZOLIN SODIUM 2 G/100ML
INJECTION, SOLUTION INTRAVENOUS
Status: DISPENSED
Start: 2020-01-03

## (undated) RX ORDER — FUROSEMIDE 10 MG/ML
INJECTION INTRAMUSCULAR; INTRAVENOUS
Status: DISPENSED
Start: 2020-01-03